# Patient Record
Sex: FEMALE | Race: BLACK OR AFRICAN AMERICAN | Employment: FULL TIME | URBAN - METROPOLITAN AREA
[De-identification: names, ages, dates, MRNs, and addresses within clinical notes are randomized per-mention and may not be internally consistent; named-entity substitution may affect disease eponyms.]

---

## 2022-01-18 ENCOUNTER — HOSPITAL ENCOUNTER (INPATIENT)
Age: 32
LOS: 13 days | Discharge: NURSING, REHAB OR LONG-TERM CARE | DRG: 029 | End: 2022-02-01
Attending: HOSPITALIST | Admitting: STUDENT IN AN ORGANIZED HEALTH CARE EDUCATION/TRAINING PROGRAM
Payer: COMMERCIAL

## 2022-01-18 DIAGNOSIS — Z71.89 ADVANCED CARE PLANNING/COUNSELING DISCUSSION: ICD-10-CM

## 2022-01-18 DIAGNOSIS — Z71.89 GOALS OF CARE, COUNSELING/DISCUSSION: ICD-10-CM

## 2022-01-18 DIAGNOSIS — G95.20 CORD COMPRESSION SYNDROME (HCC): Primary | ICD-10-CM

## 2022-01-18 DIAGNOSIS — Z51.5 ENCOUNTER FOR PALLIATIVE CARE: ICD-10-CM

## 2022-01-18 PROCEDURE — 65660000000 HC RM CCU STEPDOWN

## 2022-01-19 ENCOUNTER — APPOINTMENT (OUTPATIENT)
Dept: GENERAL RADIOLOGY | Age: 32
DRG: 029 | End: 2022-01-19
Attending: SPECIALIST
Payer: COMMERCIAL

## 2022-01-19 ENCOUNTER — APPOINTMENT (OUTPATIENT)
Dept: GENERAL RADIOLOGY | Age: 32
DRG: 029 | End: 2022-01-19
Attending: STUDENT IN AN ORGANIZED HEALTH CARE EDUCATION/TRAINING PROGRAM
Payer: COMMERCIAL

## 2022-01-19 ENCOUNTER — ANESTHESIA EVENT (OUTPATIENT)
Dept: SURGERY | Age: 32
DRG: 029 | End: 2022-01-19
Payer: COMMERCIAL

## 2022-01-19 ENCOUNTER — APPOINTMENT (OUTPATIENT)
Dept: MRI IMAGING | Age: 32
DRG: 029 | End: 2022-01-19
Attending: STUDENT IN AN ORGANIZED HEALTH CARE EDUCATION/TRAINING PROGRAM
Payer: COMMERCIAL

## 2022-01-19 ENCOUNTER — ANESTHESIA (OUTPATIENT)
Dept: SURGERY | Age: 32
DRG: 029 | End: 2022-01-19
Payer: COMMERCIAL

## 2022-01-19 PROBLEM — G95.20 CORD COMPRESSION SYNDROME (HCC): Status: ACTIVE | Noted: 2022-01-19

## 2022-01-19 LAB
ABO + RH BLD: NORMAL
ALBUMIN SERPL-MCNC: 3.1 G/DL (ref 3.5–5)
ALBUMIN/GLOB SERPL: 0.7 {RATIO} (ref 1.1–2.2)
ALP SERPL-CCNC: 103 U/L (ref 45–117)
ALT SERPL-CCNC: 45 U/L (ref 12–78)
ANION GAP SERPL CALC-SCNC: 7 MMOL/L (ref 5–15)
AST SERPL-CCNC: 79 U/L (ref 15–37)
BILIRUB SERPL-MCNC: 0.3 MG/DL (ref 0.2–1)
BLOOD GROUP ANTIBODIES SERPL: NORMAL
BUN SERPL-MCNC: 14 MG/DL (ref 6–20)
BUN/CREAT SERPL: 34 (ref 12–20)
CALCIUM SERPL-MCNC: 10.5 MG/DL (ref 8.5–10.1)
CHLORIDE SERPL-SCNC: 100 MMOL/L (ref 97–108)
CO2 SERPL-SCNC: 26 MMOL/L (ref 21–32)
COVID-19 RAPID TEST, COVR: NOT DETECTED
CREAT SERPL-MCNC: 0.41 MG/DL (ref 0.55–1.02)
ERYTHROCYTE [DISTWIDTH] IN BLOOD BY AUTOMATED COUNT: 13.5 % (ref 11.5–14.5)
GLOBULIN SER CALC-MCNC: 4.5 G/DL (ref 2–4)
GLUCOSE SERPL-MCNC: 95 MG/DL (ref 65–100)
HCT VFR BLD AUTO: 29.7 % (ref 35–47)
HGB BLD-MCNC: 9.4 G/DL (ref 11.5–16)
HISTORY CHECKED?,CKHIST: NORMAL
MCH RBC QN AUTO: 26 PG (ref 26–34)
MCHC RBC AUTO-ENTMCNC: 31.6 G/DL (ref 30–36.5)
MCV RBC AUTO: 82 FL (ref 80–99)
NRBC # BLD: 0 K/UL (ref 0–0.01)
NRBC BLD-RTO: 0 PER 100 WBC
PLATELET # BLD AUTO: 312 K/UL (ref 150–400)
PMV BLD AUTO: 10.3 FL (ref 8.9–12.9)
POTASSIUM SERPL-SCNC: 4.5 MMOL/L (ref 3.5–5.1)
PROT SERPL-MCNC: 7.6 G/DL (ref 6.4–8.2)
RBC # BLD AUTO: 3.62 M/UL (ref 3.8–5.2)
SODIUM SERPL-SCNC: 133 MMOL/L (ref 136–145)
SOURCE, COVRS: NORMAL
SPECIMEN EXP DATE BLD: NORMAL
WBC # BLD AUTO: 8.9 K/UL (ref 3.6–11)

## 2022-01-19 PROCEDURE — 80053 COMPREHEN METABOLIC PANEL: CPT

## 2022-01-19 PROCEDURE — 0SG10AJ FUSION OF 2 OR MORE LUMBAR VERTEBRAL JOINTS WITH INTERBODY FUSION DEVICE, POSTERIOR APPROACH, ANTERIOR COLUMN, OPEN APPROACH: ICD-10-PCS | Performed by: SPECIALIST

## 2022-01-19 PROCEDURE — 77030003666 HC NDL SPINAL BD -A: Performed by: SPECIALIST

## 2022-01-19 PROCEDURE — 74011000250 HC RX REV CODE- 250: Performed by: STUDENT IN AN ORGANIZED HEALTH CARE EDUCATION/TRAINING PROGRAM

## 2022-01-19 PROCEDURE — C1713 ANCHOR/SCREW BN/BN,TIS/BN: HCPCS | Performed by: SPECIALIST

## 2022-01-19 PROCEDURE — 74011250636 HC RX REV CODE- 250/636: Performed by: NURSE ANESTHETIST, CERTIFIED REGISTERED

## 2022-01-19 PROCEDURE — 76210000016 HC OR PH I REC 1 TO 1.5 HR: Performed by: SPECIALIST

## 2022-01-19 PROCEDURE — 74011000250 HC RX REV CODE- 250: Performed by: NURSE ANESTHETIST, CERTIFIED REGISTERED

## 2022-01-19 PROCEDURE — 72157 MRI CHEST SPINE W/O & W/DYE: CPT

## 2022-01-19 PROCEDURE — 00BX0ZX EXCISION OF THORACIC SPINAL CORD, OPEN APPROACH, DIAGNOSTIC: ICD-10-PCS | Performed by: SPECIALIST

## 2022-01-19 PROCEDURE — 74011250636 HC RX REV CODE- 250/636: Performed by: STUDENT IN AN ORGANIZED HEALTH CARE EDUCATION/TRAINING PROGRAM

## 2022-01-19 PROCEDURE — 88374 M/PHMTRC ALYS ISHQUANT/SEMIQ: CPT

## 2022-01-19 PROCEDURE — 74011250636 HC RX REV CODE- 250/636: Performed by: SPECIALIST

## 2022-01-19 PROCEDURE — 76010000174 HC OR TIME 3.5 TO 4 HR INTENSV-TIER 1: Performed by: SPECIALIST

## 2022-01-19 PROCEDURE — 0RGA0AJ FUSION OF THORACOLUMBAR VERTEBRAL JOINT WITH INTERBODY FUSION DEVICE, POSTERIOR APPROACH, ANTERIOR COLUMN, OPEN APPROACH: ICD-10-PCS | Performed by: SPECIALIST

## 2022-01-19 PROCEDURE — 77030013079 HC BLNKT BAIR HGGR 3M -A: Performed by: ANESTHESIOLOGY

## 2022-01-19 PROCEDURE — 72070 X-RAY EXAM THORAC SPINE 2VWS: CPT

## 2022-01-19 PROCEDURE — 74011250636 HC RX REV CODE- 250/636: Performed by: ANESTHESIOLOGY

## 2022-01-19 PROCEDURE — A9576 INJ PROHANCE MULTIPACK: HCPCS

## 2022-01-19 PROCEDURE — 88360 TUMOR IMMUNOHISTOCHEM/MANUAL: CPT

## 2022-01-19 PROCEDURE — 74011000250 HC RX REV CODE- 250: Performed by: SPECIALIST

## 2022-01-19 PROCEDURE — 77030040922 HC BLNKT HYPOTHRM STRY -A

## 2022-01-19 PROCEDURE — 88305 TISSUE EXAM BY PATHOLOGIST: CPT

## 2022-01-19 PROCEDURE — 72156 MRI NECK SPINE W/O & W/DYE: CPT

## 2022-01-19 PROCEDURE — 85027 COMPLETE CBC AUTOMATED: CPT

## 2022-01-19 PROCEDURE — 2709999900 HC NON-CHARGEABLE SUPPLY: Performed by: SPECIALIST

## 2022-01-19 PROCEDURE — 65660000000 HC RM CCU STEPDOWN

## 2022-01-19 PROCEDURE — 74011250637 HC RX REV CODE- 250/637: Performed by: SPECIALIST

## 2022-01-19 PROCEDURE — 88331 PATH CONSLTJ SURG 1 BLK 1SPC: CPT

## 2022-01-19 PROCEDURE — 0RG60AJ FUSION OF THORACIC VERTEBRAL JOINT WITH INTERBODY FUSION DEVICE, POSTERIOR APPROACH, ANTERIOR COLUMN, OPEN APPROACH: ICD-10-PCS | Performed by: SPECIALIST

## 2022-01-19 PROCEDURE — 77030008462 HC STPLR SKN PROX J&J -A: Performed by: SPECIALIST

## 2022-01-19 PROCEDURE — 77030021678 HC GLIDESCP STAT DISP VERT -B: Performed by: ANESTHESIOLOGY

## 2022-01-19 PROCEDURE — 77030003029 HC SUT VCRL J&J -B: Performed by: SPECIALIST

## 2022-01-19 PROCEDURE — 77030022272 HC GRFT BN MAGNIFUS OSTEO -I2: Performed by: SPECIALIST

## 2022-01-19 PROCEDURE — 77030029099 HC BN WAX SSPC -A: Performed by: SPECIALIST

## 2022-01-19 PROCEDURE — 74011250636 HC RX REV CODE- 250/636

## 2022-01-19 PROCEDURE — 86900 BLOOD TYPING SEROLOGIC ABO: CPT

## 2022-01-19 PROCEDURE — 77030012406 HC DRN WND PENRS BARD -A: Performed by: SPECIALIST

## 2022-01-19 PROCEDURE — 77030004391 HC BUR FLUT MEDT -C: Performed by: SPECIALIST

## 2022-01-19 PROCEDURE — 88342 IMHCHEM/IMCYTCHM 1ST ANTB: CPT

## 2022-01-19 PROCEDURE — 77030002933 HC SUT MCRYL J&J -A: Performed by: SPECIALIST

## 2022-01-19 PROCEDURE — 77030008684 HC TU ET CUF COVD -B: Performed by: ANESTHESIOLOGY

## 2022-01-19 PROCEDURE — 77030014650 HC SEAL MTRX FLOSEL BAXT -C: Performed by: SPECIALIST

## 2022-01-19 PROCEDURE — 76060000038 HC ANESTHESIA 3.5 TO 4 HR: Performed by: SPECIALIST

## 2022-01-19 PROCEDURE — 36415 COLL VENOUS BLD VENIPUNCTURE: CPT

## 2022-01-19 PROCEDURE — 74011250637 HC RX REV CODE- 250/637: Performed by: STUDENT IN AN ORGANIZED HEALTH CARE EDUCATION/TRAINING PROGRAM

## 2022-01-19 PROCEDURE — 77030040356 HC CORD BPLR FRCP COVD -A: Performed by: SPECIALIST

## 2022-01-19 PROCEDURE — 72158 MRI LUMBAR SPINE W/O & W/DYE: CPT

## 2022-01-19 PROCEDURE — 87635 SARS-COV-2 COVID-19 AMP PRB: CPT

## 2022-01-19 PROCEDURE — 88341 IMHCHEM/IMCYTCHM EA ADD ANTB: CPT

## 2022-01-19 DEVICE — DBM 7509212 MAGNIFUSE 1 X 20CM
Type: IMPLANTABLE DEVICE | Site: SPINE LUMBAR | Status: FUNCTIONAL
Brand: MAGNIFUSE® BONE GRAFT

## 2022-01-19 DEVICE — SCREW 55840006545 5.5/6 MAS 6.5X45 CC .
Type: IMPLANTABLE DEVICE | Site: SPINE LUMBAR | Status: FUNCTIONAL
Brand: CD HORIZON® SPINAL SYSTEM

## 2022-01-19 RX ORDER — FENTANYL 50 UG/1
1 PATCH TRANSDERMAL
Status: DISCONTINUED | OUTPATIENT
Start: 2022-01-19 | End: 2022-01-26

## 2022-01-19 RX ORDER — HYDROMORPHONE HYDROCHLORIDE 1 MG/ML
2 INJECTION, SOLUTION INTRAMUSCULAR; INTRAVENOUS; SUBCUTANEOUS ONCE
Status: COMPLETED | OUTPATIENT
Start: 2022-01-19 | End: 2022-01-19

## 2022-01-19 RX ORDER — METOPROLOL TARTRATE 25 MG/1
25 TABLET, FILM COATED ORAL EVERY 12 HOURS
Status: DISCONTINUED | OUTPATIENT
Start: 2022-01-19 | End: 2022-01-27

## 2022-01-19 RX ORDER — HYDROMORPHONE HYDROCHLORIDE 1 MG/ML
1 INJECTION, SOLUTION INTRAMUSCULAR; INTRAVENOUS; SUBCUTANEOUS
Status: DISCONTINUED | OUTPATIENT
Start: 2022-01-19 | End: 2022-01-19

## 2022-01-19 RX ORDER — HYDROMORPHONE HYDROCHLORIDE 1 MG/ML
2 INJECTION, SOLUTION INTRAMUSCULAR; INTRAVENOUS; SUBCUTANEOUS
Status: DISCONTINUED | OUTPATIENT
Start: 2022-01-19 | End: 2022-01-19 | Stop reason: SDUPTHER

## 2022-01-19 RX ORDER — HYDROMORPHONE HYDROCHLORIDE 2 MG/ML
INJECTION, SOLUTION INTRAMUSCULAR; INTRAVENOUS; SUBCUTANEOUS AS NEEDED
Status: DISCONTINUED | OUTPATIENT
Start: 2022-01-19 | End: 2022-01-19 | Stop reason: HOSPADM

## 2022-01-19 RX ORDER — MIDAZOLAM HYDROCHLORIDE 1 MG/ML
1 INJECTION, SOLUTION INTRAMUSCULAR; INTRAVENOUS AS NEEDED
Status: DISCONTINUED | OUTPATIENT
Start: 2022-01-19 | End: 2022-01-19 | Stop reason: HOSPADM

## 2022-01-19 RX ORDER — HYDROMORPHONE HYDROCHLORIDE 2 MG/ML
2 INJECTION, SOLUTION INTRAMUSCULAR; INTRAVENOUS; SUBCUTANEOUS
Status: DISCONTINUED | OUTPATIENT
Start: 2022-01-19 | End: 2022-01-20

## 2022-01-19 RX ORDER — FENTANYL CITRATE 50 UG/ML
INJECTION, SOLUTION INTRAMUSCULAR; INTRAVENOUS AS NEEDED
Status: DISCONTINUED | OUTPATIENT
Start: 2022-01-19 | End: 2022-01-19 | Stop reason: HOSPADM

## 2022-01-19 RX ORDER — ONDANSETRON 4 MG/1
4 TABLET, ORALLY DISINTEGRATING ORAL
Status: DISCONTINUED | OUTPATIENT
Start: 2022-01-19 | End: 2022-02-01 | Stop reason: HOSPADM

## 2022-01-19 RX ORDER — DEXAMETHASONE SODIUM PHOSPHATE 4 MG/ML
INJECTION, SOLUTION INTRA-ARTICULAR; INTRALESIONAL; INTRAMUSCULAR; INTRAVENOUS; SOFT TISSUE AS NEEDED
Status: DISCONTINUED | OUTPATIENT
Start: 2022-01-19 | End: 2022-01-19 | Stop reason: HOSPADM

## 2022-01-19 RX ORDER — LABETALOL HCL 20 MG/4 ML
SYRINGE (ML) INTRAVENOUS AS NEEDED
Status: DISCONTINUED | OUTPATIENT
Start: 2022-01-19 | End: 2022-01-19 | Stop reason: HOSPADM

## 2022-01-19 RX ORDER — LIDOCAINE HYDROCHLORIDE 10 MG/ML
0.1 INJECTION, SOLUTION EPIDURAL; INFILTRATION; INTRACAUDAL; PERINEURAL AS NEEDED
Status: DISCONTINUED | OUTPATIENT
Start: 2022-01-19 | End: 2022-01-19 | Stop reason: HOSPADM

## 2022-01-19 RX ORDER — ONDANSETRON 2 MG/ML
4 INJECTION INTRAMUSCULAR; INTRAVENOUS
Status: DISCONTINUED | OUTPATIENT
Start: 2022-01-19 | End: 2022-02-01 | Stop reason: HOSPADM

## 2022-01-19 RX ORDER — DIPHENHYDRAMINE HCL 25 MG
25 CAPSULE ORAL
Status: DISCONTINUED | OUTPATIENT
Start: 2022-01-19 | End: 2022-02-01 | Stop reason: HOSPADM

## 2022-01-19 RX ORDER — SODIUM CHLORIDE 0.9 % (FLUSH) 0.9 %
10 SYRINGE (ML) INJECTION
Status: COMPLETED | OUTPATIENT
Start: 2022-01-19 | End: 2022-01-19

## 2022-01-19 RX ORDER — MIDAZOLAM HYDROCHLORIDE 1 MG/ML
0.5 INJECTION, SOLUTION INTRAMUSCULAR; INTRAVENOUS
Status: DISCONTINUED | OUTPATIENT
Start: 2022-01-19 | End: 2022-01-19 | Stop reason: HOSPADM

## 2022-01-19 RX ORDER — ROCURONIUM BROMIDE 10 MG/ML
INJECTION, SOLUTION INTRAVENOUS AS NEEDED
Status: DISCONTINUED | OUTPATIENT
Start: 2022-01-19 | End: 2022-01-19 | Stop reason: HOSPADM

## 2022-01-19 RX ORDER — SODIUM CHLORIDE 9 MG/ML
150 INJECTION, SOLUTION INTRAVENOUS CONTINUOUS
Status: DISCONTINUED | OUTPATIENT
Start: 2022-01-19 | End: 2022-01-20

## 2022-01-19 RX ORDER — METOPROLOL TARTRATE 25 MG/1
25 TABLET, FILM COATED ORAL 2 TIMES DAILY
COMMUNITY
End: 2022-02-01

## 2022-01-19 RX ORDER — FENTANYL CITRATE 50 UG/ML
25 INJECTION, SOLUTION INTRAMUSCULAR; INTRAVENOUS
Status: COMPLETED | OUTPATIENT
Start: 2022-01-19 | End: 2022-01-19

## 2022-01-19 RX ORDER — ROPIVACAINE HYDROCHLORIDE 5 MG/ML
30 INJECTION, SOLUTION EPIDURAL; INFILTRATION; PERINEURAL AS NEEDED
Status: DISCONTINUED | OUTPATIENT
Start: 2022-01-19 | End: 2022-01-19 | Stop reason: HOSPADM

## 2022-01-19 RX ORDER — SODIUM CHLORIDE 0.9 % (FLUSH) 0.9 %
5-40 SYRINGE (ML) INJECTION AS NEEDED
Status: DISCONTINUED | OUTPATIENT
Start: 2022-01-19 | End: 2022-01-19 | Stop reason: HOSPADM

## 2022-01-19 RX ORDER — HYDROMORPHONE HYDROCHLORIDE 1 MG/ML
2 INJECTION, SOLUTION INTRAMUSCULAR; INTRAVENOUS; SUBCUTANEOUS
Status: DISCONTINUED | OUTPATIENT
Start: 2022-01-19 | End: 2022-01-19

## 2022-01-19 RX ORDER — MORPHINE SULFATE 2 MG/ML
2 INJECTION, SOLUTION INTRAMUSCULAR; INTRAVENOUS
Status: DISCONTINUED | OUTPATIENT
Start: 2022-01-19 | End: 2022-01-19 | Stop reason: HOSPADM

## 2022-01-19 RX ORDER — SODIUM CHLORIDE, SODIUM LACTATE, POTASSIUM CHLORIDE, CALCIUM CHLORIDE 600; 310; 30; 20 MG/100ML; MG/100ML; MG/100ML; MG/100ML
INJECTION, SOLUTION INTRAVENOUS
Status: DISCONTINUED | OUTPATIENT
Start: 2022-01-19 | End: 2022-01-19 | Stop reason: HOSPADM

## 2022-01-19 RX ORDER — POLYETHYLENE GLYCOL 3350 17 G/17G
17 POWDER, FOR SOLUTION ORAL DAILY PRN
Status: DISCONTINUED | OUTPATIENT
Start: 2022-01-19 | End: 2022-01-20

## 2022-01-19 RX ORDER — LIDOCAINE HYDROCHLORIDE 20 MG/ML
INJECTION, SOLUTION EPIDURAL; INFILTRATION; INTRACAUDAL; PERINEURAL AS NEEDED
Status: DISCONTINUED | OUTPATIENT
Start: 2022-01-19 | End: 2022-01-19 | Stop reason: HOSPADM

## 2022-01-19 RX ORDER — SODIUM CHLORIDE 9 MG/ML
250 INJECTION, SOLUTION INTRAVENOUS AS NEEDED
Status: DISCONTINUED | OUTPATIENT
Start: 2022-01-19 | End: 2022-02-01 | Stop reason: HOSPADM

## 2022-01-19 RX ORDER — SODIUM CHLORIDE 0.9 % (FLUSH) 0.9 %
5-40 SYRINGE (ML) INJECTION EVERY 8 HOURS
Status: DISCONTINUED | OUTPATIENT
Start: 2022-01-19 | End: 2022-02-01 | Stop reason: HOSPADM

## 2022-01-19 RX ORDER — NALOXONE HYDROCHLORIDE 0.4 MG/ML
0.4 INJECTION, SOLUTION INTRAMUSCULAR; INTRAVENOUS; SUBCUTANEOUS AS NEEDED
Status: DISCONTINUED | OUTPATIENT
Start: 2022-01-19 | End: 2022-02-01 | Stop reason: HOSPADM

## 2022-01-19 RX ORDER — LIDOCAINE HYDROCHLORIDE AND EPINEPHRINE 10; 10 MG/ML; UG/ML
INJECTION, SOLUTION INFILTRATION; PERINEURAL AS NEEDED
Status: DISCONTINUED | OUTPATIENT
Start: 2022-01-19 | End: 2022-01-19 | Stop reason: HOSPADM

## 2022-01-19 RX ORDER — SODIUM CHLORIDE, SODIUM LACTATE, POTASSIUM CHLORIDE, CALCIUM CHLORIDE 600; 310; 30; 20 MG/100ML; MG/100ML; MG/100ML; MG/100ML
100 INJECTION, SOLUTION INTRAVENOUS CONTINUOUS
Status: DISCONTINUED | OUTPATIENT
Start: 2022-01-19 | End: 2022-01-19 | Stop reason: HOSPADM

## 2022-01-19 RX ORDER — ONDANSETRON 2 MG/ML
4 INJECTION INTRAMUSCULAR; INTRAVENOUS AS NEEDED
Status: DISCONTINUED | OUTPATIENT
Start: 2022-01-19 | End: 2022-01-19 | Stop reason: HOSPADM

## 2022-01-19 RX ORDER — DIPHENHYDRAMINE HYDROCHLORIDE 50 MG/ML
12.5 INJECTION, SOLUTION INTRAMUSCULAR; INTRAVENOUS AS NEEDED
Status: DISCONTINUED | OUTPATIENT
Start: 2022-01-19 | End: 2022-01-19 | Stop reason: HOSPADM

## 2022-01-19 RX ORDER — OXYCODONE HYDROCHLORIDE 5 MG/1
5 TABLET ORAL AS NEEDED
Status: DISCONTINUED | OUTPATIENT
Start: 2022-01-19 | End: 2022-01-19 | Stop reason: HOSPADM

## 2022-01-19 RX ORDER — SODIUM CHLORIDE 0.9 % (FLUSH) 0.9 %
5-40 SYRINGE (ML) INJECTION EVERY 8 HOURS
Status: DISCONTINUED | OUTPATIENT
Start: 2022-01-19 | End: 2022-01-19 | Stop reason: HOSPADM

## 2022-01-19 RX ORDER — ACETAMINOPHEN 650 MG/1
650 SUPPOSITORY RECTAL
Status: DISCONTINUED | OUTPATIENT
Start: 2022-01-19 | End: 2022-02-01 | Stop reason: HOSPADM

## 2022-01-19 RX ORDER — ACETAMINOPHEN 325 MG/1
650 TABLET ORAL ONCE
Status: DISCONTINUED | OUTPATIENT
Start: 2022-01-19 | End: 2022-01-19 | Stop reason: HOSPADM

## 2022-01-19 RX ORDER — SODIUM CHLORIDE 9 MG/ML
125 INJECTION, SOLUTION INTRAVENOUS CONTINUOUS
Status: DISPENSED | OUTPATIENT
Start: 2022-01-19 | End: 2022-01-20

## 2022-01-19 RX ORDER — ACETAMINOPHEN 325 MG/1
650 TABLET ORAL
Status: DISCONTINUED | OUTPATIENT
Start: 2022-01-19 | End: 2022-02-01 | Stop reason: HOSPADM

## 2022-01-19 RX ORDER — FENTANYL CITRATE 50 UG/ML
50 INJECTION, SOLUTION INTRAMUSCULAR; INTRAVENOUS AS NEEDED
Status: DISCONTINUED | OUTPATIENT
Start: 2022-01-19 | End: 2022-01-19 | Stop reason: HOSPADM

## 2022-01-19 RX ORDER — SODIUM CHLORIDE, SODIUM LACTATE, POTASSIUM CHLORIDE, CALCIUM CHLORIDE 600; 310; 30; 20 MG/100ML; MG/100ML; MG/100ML; MG/100ML
25 INJECTION, SOLUTION INTRAVENOUS CONTINUOUS
Status: DISCONTINUED | OUTPATIENT
Start: 2022-01-19 | End: 2022-01-19 | Stop reason: HOSPADM

## 2022-01-19 RX ORDER — ACETAMINOPHEN 325 MG/1
650 TABLET ORAL EVERY 6 HOURS
Status: DISCONTINUED | OUTPATIENT
Start: 2022-01-19 | End: 2022-02-01 | Stop reason: HOSPADM

## 2022-01-19 RX ORDER — CEFAZOLIN SODIUM 1 G/3ML
INJECTION, POWDER, FOR SOLUTION INTRAMUSCULAR; INTRAVENOUS AS NEEDED
Status: DISCONTINUED | OUTPATIENT
Start: 2022-01-19 | End: 2022-01-19 | Stop reason: HOSPADM

## 2022-01-19 RX ORDER — SODIUM CHLORIDE 0.9 % (FLUSH) 0.9 %
5-40 SYRINGE (ML) INJECTION AS NEEDED
Status: DISCONTINUED | OUTPATIENT
Start: 2022-01-19 | End: 2022-02-01 | Stop reason: HOSPADM

## 2022-01-19 RX ORDER — SODIUM CHLORIDE 9 MG/ML
25 INJECTION, SOLUTION INTRAVENOUS CONTINUOUS
Status: DISCONTINUED | OUTPATIENT
Start: 2022-01-19 | End: 2022-01-19 | Stop reason: HOSPADM

## 2022-01-19 RX ORDER — HYDROMORPHONE HYDROCHLORIDE 1 MG/ML
0.2 INJECTION, SOLUTION INTRAMUSCULAR; INTRAVENOUS; SUBCUTANEOUS
Status: DISCONTINUED | OUTPATIENT
Start: 2022-01-19 | End: 2022-01-19 | Stop reason: HOSPADM

## 2022-01-19 RX ORDER — FENTANYL 25 UG/1
2 PATCH TRANSDERMAL
COMMUNITY
End: 2022-02-01

## 2022-01-19 RX ORDER — SODIUM CHLORIDE 9 MG/ML
250 INJECTION, SOLUTION INTRAVENOUS AS NEEDED
Status: CANCELLED | OUTPATIENT
Start: 2022-01-19

## 2022-01-19 RX ORDER — ONDANSETRON 2 MG/ML
INJECTION INTRAMUSCULAR; INTRAVENOUS AS NEEDED
Status: DISCONTINUED | OUTPATIENT
Start: 2022-01-19 | End: 2022-01-19 | Stop reason: HOSPADM

## 2022-01-19 RX ORDER — MIDAZOLAM HYDROCHLORIDE 1 MG/ML
INJECTION, SOLUTION INTRAMUSCULAR; INTRAVENOUS AS NEEDED
Status: DISCONTINUED | OUTPATIENT
Start: 2022-01-19 | End: 2022-01-19 | Stop reason: HOSPADM

## 2022-01-19 RX ORDER — PROPOFOL 10 MG/ML
INJECTION, EMULSION INTRAVENOUS AS NEEDED
Status: DISCONTINUED | OUTPATIENT
Start: 2022-01-19 | End: 2022-01-19 | Stop reason: HOSPADM

## 2022-01-19 RX ORDER — ONDANSETRON 2 MG/ML
4 INJECTION INTRAMUSCULAR; INTRAVENOUS
Status: ACTIVE | OUTPATIENT
Start: 2022-01-19 | End: 2022-01-20

## 2022-01-19 RX ORDER — METOPROLOL TARTRATE 5 MG/5ML
INJECTION INTRAVENOUS AS NEEDED
Status: DISCONTINUED | OUTPATIENT
Start: 2022-01-19 | End: 2022-01-19 | Stop reason: HOSPADM

## 2022-01-19 RX ORDER — SUCCINYLCHOLINE CHLORIDE 20 MG/ML
INJECTION INTRAMUSCULAR; INTRAVENOUS AS NEEDED
Status: DISCONTINUED | OUTPATIENT
Start: 2022-01-19 | End: 2022-01-19 | Stop reason: HOSPADM

## 2022-01-19 RX ADMIN — ROCURONIUM BROMIDE 25 MG: 10 SOLUTION INTRAVENOUS at 13:15

## 2022-01-19 RX ADMIN — ROCURONIUM BROMIDE 5 MG: 10 SOLUTION INTRAVENOUS at 13:06

## 2022-01-19 RX ADMIN — FENTANYL CITRATE 50 MCG: 50 INJECTION, SOLUTION INTRAMUSCULAR; INTRAVENOUS at 13:06

## 2022-01-19 RX ADMIN — METOPROLOL TARTRATE 1 MG: 5 INJECTION, SOLUTION INTRAVENOUS at 14:47

## 2022-01-19 RX ADMIN — MEPERIDINE HYDROCHLORIDE: 25 INJECTION INTRAMUSCULAR; INTRAVENOUS; SUBCUTANEOUS at 17:00

## 2022-01-19 RX ADMIN — FENTANYL CITRATE 25 MCG: 50 INJECTION, SOLUTION INTRAMUSCULAR; INTRAVENOUS at 17:24

## 2022-01-19 RX ADMIN — SODIUM CHLORIDE, POTASSIUM CHLORIDE, SODIUM LACTATE AND CALCIUM CHLORIDE 25 ML/HR: 600; 310; 30; 20 INJECTION, SOLUTION INTRAVENOUS at 11:48

## 2022-01-19 RX ADMIN — SODIUM CHLORIDE, PRESERVATIVE FREE 10 ML: 5 INJECTION INTRAVENOUS at 05:43

## 2022-01-19 RX ADMIN — FENTANYL CITRATE 50 MCG: 50 INJECTION, SOLUTION INTRAMUSCULAR; INTRAVENOUS at 13:19

## 2022-01-19 RX ADMIN — HYDROMORPHONE HYDROCHLORIDE 2 MG: 1 INJECTION, SOLUTION INTRAMUSCULAR; INTRAVENOUS; SUBCUTANEOUS at 11:07

## 2022-01-19 RX ADMIN — HYDROMORPHONE HYDROCHLORIDE 1 MG: 2 INJECTION, SOLUTION INTRAMUSCULAR; INTRAVENOUS; SUBCUTANEOUS at 15:16

## 2022-01-19 RX ADMIN — METOPROLOL TARTRATE 1 MG: 5 INJECTION, SOLUTION INTRAVENOUS at 14:18

## 2022-01-19 RX ADMIN — HYDROMORPHONE HYDROCHLORIDE 0.5 MG: 2 INJECTION, SOLUTION INTRAMUSCULAR; INTRAVENOUS; SUBCUTANEOUS at 13:31

## 2022-01-19 RX ADMIN — ROCURONIUM BROMIDE 10 MG: 10 SOLUTION INTRAVENOUS at 15:52

## 2022-01-19 RX ADMIN — METOPROLOL TARTRATE 1 MG: 5 INJECTION, SOLUTION INTRAVENOUS at 15:05

## 2022-01-19 RX ADMIN — HYDROMORPHONE HYDROCHLORIDE 1 MG: 1 INJECTION, SOLUTION INTRAMUSCULAR; INTRAVENOUS; SUBCUTANEOUS at 04:28

## 2022-01-19 RX ADMIN — DEXAMETHASONE 6 MG: 1 TABLET ORAL at 03:43

## 2022-01-19 RX ADMIN — GADOTERIDOL 20 ML: 279.3 INJECTION, SOLUTION INTRAVENOUS at 05:42

## 2022-01-19 RX ADMIN — DEXAMETHASONE SODIUM PHOSPHATE 6 MG: 4 INJECTION, SOLUTION INTRAMUSCULAR; INTRAVENOUS at 13:37

## 2022-01-19 RX ADMIN — SUGAMMADEX 200 MG: 100 INJECTION, SOLUTION INTRAVENOUS at 16:14

## 2022-01-19 RX ADMIN — ACETAMINOPHEN 650 MG: 325 TABLET ORAL at 19:22

## 2022-01-19 RX ADMIN — ROCURONIUM BROMIDE 10 MG: 10 SOLUTION INTRAVENOUS at 14:30

## 2022-01-19 RX ADMIN — ROCURONIUM BROMIDE 20 MG: 10 SOLUTION INTRAVENOUS at 13:43

## 2022-01-19 RX ADMIN — LABETALOL 20 MG/4 ML (5 MG/ML) INTRAVENOUS SYRINGE 5 MG: at 13:33

## 2022-01-19 RX ADMIN — CEFAZOLIN 2 G: 330 INJECTION, POWDER, FOR SOLUTION INTRAMUSCULAR; INTRAVENOUS at 13:18

## 2022-01-19 RX ADMIN — LABETALOL 20 MG/4 ML (5 MG/ML) INTRAVENOUS SYRINGE 5 MG: at 13:58

## 2022-01-19 RX ADMIN — LIDOCAINE HYDROCHLORIDE 100 MG: 20 INJECTION, SOLUTION EPIDURAL; INFILTRATION; INTRACAUDAL; PERINEURAL at 13:06

## 2022-01-19 RX ADMIN — PROPOFOL 150 MG: 10 INJECTION, EMULSION INTRAVENOUS at 13:06

## 2022-01-19 RX ADMIN — FENTANYL CITRATE 25 MCG: 50 INJECTION, SOLUTION INTRAMUSCULAR; INTRAVENOUS at 17:16

## 2022-01-19 RX ADMIN — FENTANYL CITRATE 25 MCG: 50 INJECTION, SOLUTION INTRAMUSCULAR; INTRAVENOUS at 17:37

## 2022-01-19 RX ADMIN — METOPROLOL TARTRATE 1 MG: 5 INJECTION, SOLUTION INTRAVENOUS at 14:12

## 2022-01-19 RX ADMIN — METOPROLOL TARTRATE 25 MG: 25 TABLET, FILM COATED ORAL at 23:00

## 2022-01-19 RX ADMIN — LABETALOL 20 MG/4 ML (5 MG/ML) INTRAVENOUS SYRINGE 5 MG: at 13:47

## 2022-01-19 RX ADMIN — DEXAMETHASONE 6 MG: 1 TABLET ORAL at 23:01

## 2022-01-19 RX ADMIN — LABETALOL 20 MG/4 ML (5 MG/ML) INTRAVENOUS SYRINGE 5 MG: at 13:40

## 2022-01-19 RX ADMIN — ROCURONIUM BROMIDE 10 MG: 10 SOLUTION INTRAVENOUS at 13:55

## 2022-01-19 RX ADMIN — SODIUM CHLORIDE, PRESERVATIVE FREE 10 ML: 5 INJECTION INTRAVENOUS at 22:00

## 2022-01-19 RX ADMIN — SODIUM CHLORIDE, POTASSIUM CHLORIDE, SODIUM LACTATE AND CALCIUM CHLORIDE: 600; 310; 30; 20 INJECTION, SOLUTION INTRAVENOUS at 15:04

## 2022-01-19 RX ADMIN — SUCCINYLCHOLINE CHLORIDE 140 MG: 20 INJECTION, SOLUTION INTRAMUSCULAR; INTRAVENOUS at 13:06

## 2022-01-19 RX ADMIN — SODIUM CHLORIDE, PRESERVATIVE FREE 10 ML: 5 INJECTION INTRAVENOUS at 08:14

## 2022-01-19 RX ADMIN — ACETAMINOPHEN 650 MG: 325 TABLET ORAL at 23:00

## 2022-01-19 RX ADMIN — SODIUM CHLORIDE, POTASSIUM CHLORIDE, SODIUM LACTATE AND CALCIUM CHLORIDE: 600; 310; 30; 20 INJECTION, SOLUTION INTRAVENOUS at 12:57

## 2022-01-19 RX ADMIN — MIDAZOLAM 2 MG: 1 INJECTION INTRAMUSCULAR; INTRAVENOUS at 12:57

## 2022-01-19 RX ADMIN — FENTANYL CITRATE 25 MCG: 50 INJECTION, SOLUTION INTRAMUSCULAR; INTRAVENOUS at 17:30

## 2022-01-19 RX ADMIN — WATER 2 G: 1 INJECTION INTRAMUSCULAR; INTRAVENOUS; SUBCUTANEOUS at 23:19

## 2022-01-19 RX ADMIN — HYDROMORPHONE HYDROCHLORIDE 1 MG: 2 INJECTION, SOLUTION INTRAMUSCULAR; INTRAVENOUS; SUBCUTANEOUS at 15:34

## 2022-01-19 RX ADMIN — ONDANSETRON HYDROCHLORIDE 4 MG: 2 INJECTION, SOLUTION INTRAMUSCULAR; INTRAVENOUS at 16:05

## 2022-01-19 RX ADMIN — ROCURONIUM BROMIDE 10 MG: 10 SOLUTION INTRAVENOUS at 14:46

## 2022-01-19 RX ADMIN — METOPROLOL TARTRATE 1 MG: 5 INJECTION, SOLUTION INTRAVENOUS at 15:01

## 2022-01-19 RX ADMIN — PROPOFOL 50 MG: 10 INJECTION, EMULSION INTRAVENOUS at 13:59

## 2022-01-19 RX ADMIN — HYDROMORPHONE HYDROCHLORIDE 1 MG: 1 INJECTION, SOLUTION INTRAMUSCULAR; INTRAVENOUS; SUBCUTANEOUS at 08:14

## 2022-01-19 RX ADMIN — HYDROMORPHONE HYDROCHLORIDE 0.5 MG: 2 INJECTION, SOLUTION INTRAMUSCULAR; INTRAVENOUS; SUBCUTANEOUS at 13:58

## 2022-01-19 RX ADMIN — MORPHINE SULFATE 2 MG: 2 INJECTION, SOLUTION INTRAMUSCULAR; INTRAVENOUS at 17:44

## 2022-01-19 RX ADMIN — ROCURONIUM BROMIDE 10 MG: 10 SOLUTION INTRAVENOUS at 15:15

## 2022-01-19 RX ADMIN — METOPROLOL TARTRATE 25 MG: 25 TABLET, FILM COATED ORAL at 09:34

## 2022-01-19 RX ADMIN — HYDROMORPHONE HYDROCHLORIDE 2 MG: 2 INJECTION INTRAMUSCULAR; INTRAVENOUS; SUBCUTANEOUS at 19:28

## 2022-01-19 RX ADMIN — SODIUM CHLORIDE 150 ML/HR: 9 INJECTION, SOLUTION INTRAVENOUS at 08:14

## 2022-01-19 RX ADMIN — SODIUM CHLORIDE 125 ML/HR: 900 INJECTION, SOLUTION INTRAVENOUS at 17:39

## 2022-01-19 NOTE — PROGRESS NOTES
Physical Therapy: Hold    Chart reviewed in preparation for PT evaluation. Note patient has multiple spinal fractures and severe cord compression. Will hold PT at this time and will f/u after neurosurgical intervention.     Lashonda Erickson, PT, DPT

## 2022-01-19 NOTE — PERIOP NOTES
Surgifoam Absorbable Gelatin Sponge Size 100 used during the procedure  Ref # [id number]  Lot # [id number]  Exp. Date 9/28/2025    Floseal Hemostatic Matrix, 10 ml used during the procedure  Ref # [id number]  Lot # [id number]  Exp.  Date 10/25/2023

## 2022-01-19 NOTE — CONSULTS
Neurosurgery  Pt seen and examined, full consult to follow. Known metastatic breast cancer. Several days of urinary retention, received a catheter on last ED visit  Now 2 days unable to move legs. MRI shows extensive cancer at every level of spine. Pathologic compression fracture L1. With significant disease in the epidural space and causing cord compression. I discussed findings with her and showed her the imaging. Discussed need to decompress her given her paralysis. Need to fuse given fracture and kyphosis    We discussed the risk of bleeding, infection, nerve or spinal cord injury which could lead to worsening of the pain, numbness or weakness, spinal cord injury which could lead to worsening paralysis,   the risk of misplacement of the graft, screws or rods, the chance that the screws will not hold given her extensive cancer. The risk of non-union of the bone, the risk of heart attack, stroke, coma and death,   blood clots in legs, urine infection or pneumonia     We discussed that she may not regain any function despite what we do. Will proceed with T11- L2 decompression and fusion.

## 2022-01-19 NOTE — PROGRESS NOTES
Spiritual Care Assessment/Progress Note  [facility]      NAME: Milton Huerta      MRN: [id number]  AGE: 33 y.o. SEX: female  Judaism Affiliation: Unknown   Language: English     1/19/2022     Total Time (in minutes): 5     Spiritual Assessment begun in [facility] through conversation with:         []Patient        [] Family    [] Friend(s)        Reason for Consult: Palliative Care, Initial/Spiritual Assessment     Spiritual beliefs: (Please include comment if needed)     [] Identifies with a eloy tradition:         [] Supported by a eloy community:            [] Claims no spiritual orientation:           [] Seeking spiritual identity:                [] Adheres to an individual form of spirituality:           [x] Not able to assess:                           Identified resources for coping:      [] Prayer                               [] Music                  [] Guided Imagery     [] Family/friends                 [] Pet visits     [] Devotional reading                         [x] Unknown     [] Other:                                               Interventions offered during this visit: (See comments for more details)                Plan of Care:     [] Support spiritual and/or cultural needs    [] Support AMD and/or advance care planning process      [] Support grieving process   [] Coordinate Rites and/or Rituals    [] Coordination with community clergy   [] No spiritual needs identified at this time   [] Detailed Plan of Care below (See Comments)  [] Make referral to Music Therapy  [] Make referral to Pet Therapy     [] Make referral to Addiction services  [] Make referral to [facility]  [] Make referral to Spiritual Care Partner  [] No future visits requested        [x] Contact Spiritual Care for further referrals     Comments: Attempted Initial Spiritual Assessment for this pt in [facility] 665. Reviewed pt's chart prior to this visit. Pt was BUNNY and therefore unable to be assessed at this time. No family/friends present at time of visit. Contact Spiritual Care Services for any spiritual or emotional support needs. Jesus Lares MDiv.  Staff   Request  Support/Spiritual Care Services on [phone])

## 2022-01-19 NOTE — PERIOP NOTES
Per Dr. Marcin Zuñiga patients baseline movement was very little in BLE. Per patient was unable to walk prior to surgery      1750-MD wrote order for orthopedic floor however, patient has an order for telemetry so patient is going to room 664      1803-TRANSFER - OUT REPORT:    Verbal report given to JULIAN Verma(name) on Rahat Muñoz  being transferred to Room 665(unit) for routine post - op       Report consisted of patients Situation, Background, Assessment and   Recommendations(SBAR). Time Pre op antibiotic given:1318  Anesthesia Stop time: 2243  Ochoa Present on Transfer to floor:yes  Order for Ochoa on Chart:yes  Discharge Prescriptions with Chart:no    Information from the following report(s) SBAR, OR Summary, Procedure Summary, Intake/Output, MAR, Recent Results and Cardiac Rhythm sinus tach was reviewed with the receiving nurse. Opportunity for questions and clarification was provided. Is the patient on 02? NO           Is the patient on a monitor? YES    Is the nurse transporting with the patient? YES    Surgical Waiting Area notified of patient's transfer from PACU? NO      The following personal items collected during your admission accompanied patient upon transfer:   Dental Appliance: Dental Appliances: None  Vision:    Hearing Aid:    Jewelry:    Clothing:    Other Valuables:    Valuables sent to safe:      No valuables to be transferred with patient.   Patient came from room

## 2022-01-19 NOTE — ANESTHESIA PREPROCEDURE EVALUATION
Relevant Problems   No relevant active problems       Anesthetic History   No history of anesthetic complications            Review of Systems / Medical History  Patient summary reviewed, nursing notes reviewed and pertinent labs reviewed    Pulmonary  Within defined limits                 Neuro/Psych             Comments: Thoracic cord compression due to tumor Cardiovascular                       GI/Hepatic/Renal                Endo/Other        Cancer and anemia     Other Findings              Physical Exam    Airway  Mallampati: II  TM Distance: > 6 cm  Neck ROM: normal range of motion   Mouth opening: Normal     Cardiovascular    Rhythm: regular  Rate: normal         Dental    Dentition: Upper dentition intact and Lower dentition intact     Pulmonary  Breath sounds clear to auscultation               Abdominal  GI exam deferred       Other Findings            Anesthetic Plan    ASA: 3  Anesthesia type: general          Induction: Intravenous  Anesthetic plan and risks discussed with: Patient

## 2022-01-19 NOTE — CONSULTS
[address]    Name:  Jose Alfredo Brown  MR#:  [id number]  :  1990  ACCOUNT #:  [de-identified]  DATE OF SERVICE:  2022    INPATIENT CONSULTATION    REASON FOR CONSULTATION:  Spinal cord tumor. HISTORY OF PRESENT ILLNESS:  The patient is a 49-year-old female, she has known history of metastatic breast cancer. PET scan in the end of December showed disease in the spine and liver. I do not have any further information on her breast cancer. She came  . She has had two visits to the emergency room on  and  for low back pain and urinary retention, they put a catheter in and sent her home. For the last two days, she has had inability to walk and an inability to move her legs probably two days now. She cannot feel when we touch her; however, she only has minimal movement of her toes. She was transferred here and had an MRI done, which showed extensive disease. I was called for evaluation. PAST MEDICAL HISTORY:  Breast cancer, question SVT. MEDICATIONS:    fentanyl patch 25 mcg. ALLERGIES:  NO KNOWN DRUG ALLERGIES. SOCIAL HISTORY:  She lives with her parents. Does not smoke. FAMILY HISTORY:  Noncontributory. REVIEW OF SYSTEMS:  No nausea or vomiting, fevers, chills, chest pain or shortness of breath. Rest of the 10 systems are reviewed and are negative except as above. PHYSICAL EXAMINATION:  GENERAL:  She is a well-developed, well-nourished female, sitting in no acute distress. HEENT:  Her head is normocephalic, atraumatic. Pupils are equal, round, and reactive to light. Extraocular movements are intact. Face is symmetric. Tongue and uvula are midline. NEUROLOGIC:  Shoulder shrug is normal.  Strength is 5/5 in upper extremities. In her lower extremities, she is barely able to wiggle her toes. When I hold up her leg, her strength in her knee, extension is 2/5, hip flexors are 1/5 to 0/5.   Sensation is decreased, but intact in her lower extremities. She is awake, alert, oriented x3. Her speech is fluent. She has normal recent and remote memory. Normal fund of knowledge. Normal cerebellar exam in upper extremities. IMAGING:  MRI shows extensive disease throughout her entire cervical, thoracic, and lumbar spine. At L1, there is compression fracture with epidural disease and certainly the spinal cord and out the soft tissue on the right causing significant spinal cord narrowing and compression. IMPRESSION:  Extensive bony disease and epidural spread. RECOMMENDATIONS:  I had a long discussion with her and showed her the films. We discussed the risks, which were outlined in my progress note. She also has liver metastasis by report. We discussed taking her to surgery, decompressing her spine and doing a fusion. Discussed that she may not get better despite what we do. We will take her urgently as soon as the operating room is open.       Trisha Espitia MD      [id number]  D:  01/19/2022 11:26  T:  01/19/2022 16:38  JOB #:  [id number]

## 2022-01-19 NOTE — ADT AUTH CERT NOTES
[address]     FACILITY NPI :[id number]  FACILITY TAX ID :      [address]  [address] [facility] [address] [phone]          DEPT CONTACT:  Marleny Holt  NB#[phone]  [facility]#[phone]       Patient Name :Cynthia Adler   : 1990 (31 yrs)  MRN : [id number]     Patient Mailing Address [address] [facility] [[address] , [address]                  Insurance Plan Payor: BLUE CROSS / Plan: [address] [place] [address] / Product Type: PPO /      Primary Coverage Subscriber ID : [id number]     Secondary Coverage:  N/A        Current Patient Class : INPATIENT  Admit Date : 2022     REQUESTED LEVEL OF CARE: INPATIENT [101]                                                           Diagnosis : Cord compression syndrome ([facility] [address]                          ICD10 Code : Cord compression syndrome ([facility] [address] [G95.20]     Current Room and Bed OR/PL     Admitting and Attending Info:  Admitting Provider : João Hartmann MD   NPI: [id number]  Admitting Provider Phone.  [phone]  Admitting Provider Address: [address] [facility] 2nd Floor     Attending Provider Rodolfo Purcell MD   [id number]  Attending Provider Address:  [address] [place] [address]                                                    [id number]     Attending Provider Phone: Attending leo phone: [phone]

## 2022-01-19 NOTE — H&P
History & Physical    Primary Care Provider: Unknown, Provider, DPM  Source of Information: Patient and chart review    History of Presenting Illness:   Devon Sinclair is a 33 y.o. female with known medical history of metastatic breast cancer, svt/?tachycardia who presented to [facility] emergency room with complaints of urinary retention and inability to walk. Per chart review, patient has had recurrent visit to same ear initially in January 13 with concerns of low back pain treated with opioids. She was discharged and returned January 17 with concerns of urinary retention treated with catheterization. Returns with complaints of lower extremity weakness with difficulty walking as well as difficulty urinating. She describes this as having an urge to urinate and not having the capacity to push hard her urine. Had Ochoa catheter placed in ER. Upon further questioning, patient reports she has known meds to her pelvis, coccyx and potentially cervical spine which her oncologist was planning to obtain an MRI for. She is transferred here for availability of neurosurgery. The patient denies any fever, chills, chest or abdominal pain, nausea, vomiting, cough, congestion, recent illness, palpitations, or dysuria. Remarkable vitals on ER Presentations: BP on ER presentation 153/105. Labs Remarkable for: Rapid COVID-negative  ER Images: none     Review of Systems:  A comprehensive review of systems was negative except for that written in the History of Present Illness. No past medical history on file. No past surgical history on file. Prior to Admission medications    Not on File     Not on File   No family history on file.      SOCIAL HISTORY:  Patient resides:  Independently x   Assisted Living    SNF    With family care       Smoking history:   None x   Former    Chronic      Alcohol history:   None x   Social    Chronic      Ambulates:   Independently x w/cane    w/walker    w/wc    CODE STATUS:  DNR    Full x   Other      Objective:     Physical Exam:     Visit Vitals  BP (!) 141/103 (BP 1 Location: Right upper arm, BP Patient Position: At rest)   Pulse (!) 102   Temp 98 °F (36.7 °C)   Resp 20   SpO2 98%           General:  Alert, cooperative, no distress, appears stated age. Head:  Normocephalic, without obvious abnormality, atraumatic. Eyes:  Conjunctivae/corneas clear. PERRL, EOMs intact. Nose: Nares normal. Septum midline. Mucosa normal.        Neck: Supple, symmetrical, trachea midline, no carotid bruit and no JVD. Lungs:   Clear to auscultation bilaterally. Chest wall:  No tenderness or deformity. Heart:  Regular rate and rhythm, S1, S2 normal, no murmur, click, rub or gallop. Abdomen:   Soft, non-tender. Bowel sounds normal. No masses,  No organomegaly. Extremities: Extremities normal, atraumatic, no cyanosis or edema. Pulses: 2+ and symmetric all extremities. Skin: Skin color, texture, turgor normal. No rashes or lesions   Neurologic: CNII-XII intact. 1/5 strength in bilateral LEs. Decreased DTRs. Data Review:     Recent Days:  No results for input(s): WBC, HGB, HCT, PLT, HGBEXT, HCTEXT, PLTEXT in the last 72 hours. No results for input(s): NA, K, CL, CO2, GLU, BUN, CREA, CA, MG, PHOS, ALB, TBIL, ALT, INR, INREXT in the last 72 hours. No lab exists for component: SGOT  No results for input(s): PH, PCO2, PO2, HCO3, FIO2 in the last 72 hours. 24 Hour Results:  No results found for this or any previous visit (from the past 24 hour(s)). Imaging:     Assessment:     Yesenia Silverman is a 33 y.o. female with known medical history of metastatic breast cancer, svt/?tachycardia who is admitted for ? ?lumbar cord compression.        Plan:       ?Lumbar Cord Compression / Radiculopathy  -Admit to monitored telemetry  -MRI Cervical, thoracic and lumbar spine  -Dilaudid prn  -NS consult  -PT/OT/Palliative consult    Hx of SVT  -Continue metoprolol           FEN/GI -  NS @ 150 ml/hr  Activity - as tolerated  DVT prophylaxis - SCDs  GI prophylaxis -  NI  Disposition - Home    CODE STATUS:  Full code       Signed By: Sandi Lam MD     January 19, 2022

## 2022-01-19 NOTE — PROGRESS NOTES
Occupational Therapy: hold    Chart reviewed in preparation for OT evaluation. Note patient has multiple spinal fractures and severe cord compression. Will hold OT at this time and will f/u after neurosurgical intervention.     Kayla French, OTR/L

## 2022-01-19 NOTE — PROGRESS NOTES
Neurosurgery    Dr. Brandon Sanchez is aware of this consult from last night and will by to see the patient shortly. Send rapid COVID test for OR please. NPO.     Anjelica Saxena NP

## 2022-01-19 NOTE — ADT AUTH CERT NOTES
[address]     FACILITY NPI :[id number]  FACILITY TAX ID :      . [facility]  [address] [facility] [address] [phone]          DEPT CONTACT:  Ryland Narvaez  AS#[phone]  DGX#[phone]       Patient Name :Jessica Phan   : 1990 (31 yrs)  MRN : [id number]     Patient Mailing Address [address] [facility] [[address]] , [facility]                  Insurance Plan Payor: BLUE CROSS / Plan: [address] [place] [address] / Product Type: PPO /      Primary Coverage Subscriber ID : [id number]     Secondary Coverage:  N/A        Current Patient Class : INPATIENT  Admit Date : 2022     REQUESTED LEVEL OF CARE: INPATIENT [101]                                                           Diagnosis : Cord compression syndrome ([facility] [address]                          ICD10 Code : Cord compression syndrome ([facility] [address] [G95.20]     Current Room and Bed OR/PL     Admitting and Attending Info:  Admitting Provider : Doc Brito MD   NPI: [id number]  Admitting Provider Phone.  [phone]  Admitting Provider Address: [address] [facility]nd [address]     Attending Provider Renetta Wong MD   [id number]  Attending Provider Address:  [address] [place] [address]                                                    [id number]     Attending Provider Phone: Attending leo phone: [phone]

## 2022-01-19 NOTE — BRIEF OP NOTE
Brief Postoperative Note    Patient: Sierra Alonzo  YOB: 1990  MRN: [id number]    Date of Procedure: 1/19/2022     Pre-Op Diagnosis: Epidural spinal tumor    Post-Op Diagnosis: Same as preoperative diagnosis. Procedure(s):  T11 - L3 LAMINECTOMY AND FUSION WITH O-ARM    Surgeon(s):  Jonathon Reza MD    Surgical Assistant: Surg Asst-1: Bharathi Macias    Anesthesia: General     Estimated Blood Loss (mL): less than 203     Complications: None    Specimens:   ID Type Source Tests Collected by Time Destination   1 : Lumbar epidural tumor Frozen Section Spine  Jonathon Reza MD 1/19/2022 1508 Pathology   2 : Lumbar epidural tumor Fresh Spine  Jonathon Reza MD 1/19/2022 1621 Pathology        Implants:   Implant Name Type Inv. Item Serial No.  Lot No. LRB No. Used Action   SET SCR SPNL L6MM DIA5. 5MM TI BRK OFF SPRING W/ DETACH [employer] [employer] - SN/A  SET SCR SPNL L6MM DIA5. 5MM TI BRK OFF SPRING W/ DETACH [address] [employer] N/A [employer] SOFAMOR DANEK_WD N/A N/A 8 Implanted   SCREW SPNL L45MM DIA5. 5MM POST THORACOLUMBOSACRAL CO CHROM - SN/A  SCREW SPNL L45MM DIA5. 5MM POST THORACOLUMBOSACRAL CO CHROM N/A [employer] SOFAMOR DANEK_WD N/A N/A 3 Implanted   SCREW SPNL L50MM DIA5. 5MM POST THORACOLUMBOSACRAL CO CHROM - SN/A  SCREW SPNL L50MM DIA5. 5MM POST THORACOLUMBOSACRAL CO CHROM N/A [employer] SOFAMOR DANEK_WD N/A N/A 1 Implanted   SCREW SPNL L40MM DIA6. 5MM POST THORACOLUMBOSACRAL CO CHROM - SN/A  SCREW SPNL L40MM DIA6. 5MM POST THORACOLUMBOSACRAL CO CHROM N/A [employer] SOFAMOR DANEK_WD N/A N/A 3 Implanted   SCREW SPNL L45MM DIA6. 5MM POST THORACOLUMBOSACRAL CO CHROM - SN/A  SCREW SPNL L45MM DIA6. 5MM POST THORACOLUMBOSACRAL CO CHROM N/A [employer] SOFAMOR DANEK_WD N/A N/A 1 Implanted   GRAFT BNE SUB [id number] SPNL DEFORMITY MAGNIFUSE - [id number]  GRAFT BNE SUB [id number] SPNL DEFORMITY MAGNIFUSE [id number] [employer] SPINALGRAFT TECH_WD N/A N/A 1 Implanted   CHARY SPNL L110MM DIA5. 5MM TI ANT POST THORACOLUMBOSACRAL CRV - SN/A  CHARY SPNL L110MM DIA5. 5MM TI ANT POST THORACOLUMBOSACRAL CRV N/A [employer] SOFAMOR DANEK_WD N/A N/A 2 Implanted       Drains: * No LDAs found *    Findings: large epidural tumor    Electronically Signed by Tali Bazan MD on 1/19/2022 at 4:22 PM

## 2022-01-19 NOTE — PROGRESS NOTES
Bedside shift change report given to Liz Evans RN (oncoming nurse) by Nayeli Stafford RN (offgoing nurse). Report included the following information SBAR, Cardiac Rhythm nsr and Dual Neuro Assessment.

## 2022-01-19 NOTE — CONSULTS
Patient seen, chart reviewed, note dictated. Consult received and appreciated. Patient of my partner Dr. 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 Received TCHP followed by maintenance trastuzumab/pertuzumab X 1 year, then continued on GHRH/Tamoxifen. Recently noted to have worsening bone pain. CT/Bone scan then PET imaging as an outpatient showed worsening bony disease with ultimate plans for a biopsy to direct subsequent care. Now admitted with SCC at L1-L2 currently in the OR. Will meet and discuss tomorrow AM once.     Arnaud Anthony MD  Hem/Onc  [phone]

## 2022-01-20 LAB — HGB BLD-MCNC: 8.6 G/DL (ref 11.5–16)

## 2022-01-20 PROCEDURE — 97165 OT EVAL LOW COMPLEX 30 MIN: CPT

## 2022-01-20 PROCEDURE — 74011250636 HC RX REV CODE- 250/636: Performed by: PHYSICIAN ASSISTANT

## 2022-01-20 PROCEDURE — 97530 THERAPEUTIC ACTIVITIES: CPT

## 2022-01-20 PROCEDURE — 74011250636 HC RX REV CODE- 250/636: Performed by: SPECIALIST

## 2022-01-20 PROCEDURE — 51798 US URINE CAPACITY MEASURE: CPT

## 2022-01-20 PROCEDURE — 74011250637 HC RX REV CODE- 250/637: Performed by: SPECIALIST

## 2022-01-20 PROCEDURE — 74011250637 HC RX REV CODE- 250/637: Performed by: PHYSICIAN ASSISTANT

## 2022-01-20 PROCEDURE — 85018 HEMOGLOBIN: CPT

## 2022-01-20 PROCEDURE — 74011000250 HC RX REV CODE- 250: Performed by: STUDENT IN AN ORGANIZED HEALTH CARE EDUCATION/TRAINING PROGRAM

## 2022-01-20 PROCEDURE — 36415 COLL VENOUS BLD VENIPUNCTURE: CPT

## 2022-01-20 PROCEDURE — 74011250636 HC RX REV CODE- 250/636: Performed by: HOSPITALIST

## 2022-01-20 PROCEDURE — 74011250637 HC RX REV CODE- 250/637: Performed by: HOSPITALIST

## 2022-01-20 PROCEDURE — 74011250636 HC RX REV CODE- 250/636: Performed by: STUDENT IN AN ORGANIZED HEALTH CARE EDUCATION/TRAINING PROGRAM

## 2022-01-20 PROCEDURE — 94760 N-INVAS EAR/PLS OXIMETRY 1: CPT

## 2022-01-20 PROCEDURE — 65660000000 HC RM CCU STEPDOWN

## 2022-01-20 PROCEDURE — 97163 PT EVAL HIGH COMPLEX 45 MIN: CPT

## 2022-01-20 PROCEDURE — 74011000250 HC RX REV CODE- 250: Performed by: SPECIALIST

## 2022-01-20 RX ORDER — HYDROMORPHONE HYDROCHLORIDE 2 MG/1
1 TABLET ORAL
Status: DISCONTINUED | OUTPATIENT
Start: 2022-01-20 | End: 2022-01-27

## 2022-01-20 RX ORDER — DEXAMETHASONE 4 MG/1
4 TABLET ORAL EVERY 12 HOURS
Status: DISCONTINUED | OUTPATIENT
Start: 2022-01-20 | End: 2022-01-21

## 2022-01-20 RX ORDER — POLYETHYLENE GLYCOL 3350 17 G/17G
17 POWDER, FOR SOLUTION ORAL DAILY
Status: DISCONTINUED | OUTPATIENT
Start: 2022-01-21 | End: 2022-02-01 | Stop reason: HOSPADM

## 2022-01-20 RX ORDER — HYDROMORPHONE HYDROCHLORIDE 2 MG/1
2 TABLET ORAL
Status: DISCONTINUED | OUTPATIENT
Start: 2022-01-20 | End: 2022-01-27

## 2022-01-20 RX ORDER — AMOXICILLIN 250 MG
1 CAPSULE ORAL DAILY
Status: DISCONTINUED | OUTPATIENT
Start: 2022-01-20 | End: 2022-01-27

## 2022-01-20 RX ORDER — HYDROMORPHONE HYDROCHLORIDE 2 MG/ML
2 INJECTION, SOLUTION INTRAMUSCULAR; INTRAVENOUS; SUBCUTANEOUS
Status: DISCONTINUED | OUTPATIENT
Start: 2022-01-20 | End: 2022-01-24

## 2022-01-20 RX ORDER — GABAPENTIN 100 MG/1
200 CAPSULE ORAL 3 TIMES DAILY
Status: DISCONTINUED | OUTPATIENT
Start: 2022-01-20 | End: 2022-01-21

## 2022-01-20 RX ADMIN — SODIUM CHLORIDE, PRESERVATIVE FREE 10 ML: 5 INJECTION INTRAVENOUS at 13:56

## 2022-01-20 RX ADMIN — SODIUM CHLORIDE 125 ML/HR: 900 INJECTION, SOLUTION INTRAVENOUS at 03:08

## 2022-01-20 RX ADMIN — DEXAMETHASONE 4 MG: 4 TABLET ORAL at 21:19

## 2022-01-20 RX ADMIN — SODIUM CHLORIDE 125 ML/HR: 900 INJECTION, SOLUTION INTRAVENOUS at 07:40

## 2022-01-20 RX ADMIN — METOPROLOL TARTRATE 25 MG: 25 TABLET, FILM COATED ORAL at 09:24

## 2022-01-20 RX ADMIN — SODIUM CHLORIDE, PRESERVATIVE FREE 10 ML: 5 INJECTION INTRAVENOUS at 05:14

## 2022-01-20 RX ADMIN — METOPROLOL TARTRATE 25 MG: 25 TABLET, FILM COATED ORAL at 21:20

## 2022-01-20 RX ADMIN — GABAPENTIN 200 MG: 100 CAPSULE ORAL at 16:10

## 2022-01-20 RX ADMIN — DEXAMETHASONE 6 MG: 1 TABLET ORAL at 09:23

## 2022-01-20 RX ADMIN — SODIUM CHLORIDE, PRESERVATIVE FREE 10 ML: 5 INJECTION INTRAVENOUS at 05:04

## 2022-01-20 RX ADMIN — GABAPENTIN 200 MG: 100 CAPSULE ORAL at 21:20

## 2022-01-20 RX ADMIN — HYDROMORPHONE HYDROCHLORIDE 2 MG: 2 INJECTION INTRAMUSCULAR; INTRAVENOUS; SUBCUTANEOUS at 15:08

## 2022-01-20 RX ADMIN — Medication 1 LOZENGE: at 03:06

## 2022-01-20 RX ADMIN — GABAPENTIN 200 MG: 100 CAPSULE ORAL at 11:11

## 2022-01-20 RX ADMIN — ACETAMINOPHEN 650 MG: 325 TABLET ORAL at 05:03

## 2022-01-20 RX ADMIN — HYDROMORPHONE HYDROCHLORIDE 2 MG: 2 INJECTION INTRAMUSCULAR; INTRAVENOUS; SUBCUTANEOUS at 21:20

## 2022-01-20 RX ADMIN — ACETAMINOPHEN 650 MG: 325 TABLET ORAL at 11:11

## 2022-01-20 RX ADMIN — HYDROMORPHONE HYDROCHLORIDE 2 MG: 2 TABLET ORAL at 13:56

## 2022-01-20 RX ADMIN — WATER 2 G: 1 INJECTION INTRAMUSCULAR; INTRAVENOUS; SUBCUTANEOUS at 05:04

## 2022-01-20 RX ADMIN — HYDROMORPHONE HYDROCHLORIDE 2 MG: 2 INJECTION INTRAMUSCULAR; INTRAVENOUS; SUBCUTANEOUS at 09:23

## 2022-01-20 RX ADMIN — ACETAMINOPHEN 650 MG: 325 TABLET ORAL at 18:19

## 2022-01-20 RX ADMIN — HYDROMORPHONE HYDROCHLORIDE 2 MG: 2 INJECTION INTRAMUSCULAR; INTRAVENOUS; SUBCUTANEOUS at 05:03

## 2022-01-20 RX ADMIN — SENNOSIDES AND DOCUSATE SODIUM 1 TABLET: 50; 8.6 TABLET ORAL at 11:11

## 2022-01-20 NOTE — PROGRESS NOTES
Occupational Therapy    Occupational therapy evaluation completed. Full documentation to follow. Recommend inpatient rehab at d/c.     [place], OTR/L

## 2022-01-20 NOTE — OP NOTES
[address]  OPERATIVE REPORT    Name:  Ovidio Maher  MR#:  [id number]  :  1990  ACCOUNT #:  [de-identified]  DATE OF SERVICE:  2022    PREOPERATIVE DIAGNOSIS:  Epidural spinal metastatic cancer. POSTOPERATIVE DIAGNOSIS:  Epidural spinal metastatic cancer. PROCEDURES PERFORMED:  1. T11-L3 laminectomy. 2.  T11 to L3 fusion. 3.  T11 to L3 instrumentation. 4.  Use of the O-arm. SURGEON:  Danisha Read MD    FIRST ASSISTANT:  OR staff. ANESTHESIA:  General endotracheal.    COMPLICATIONS:  No complications. SPECIMENS REMOVED:  Specimens were sent for frozen and permanent, frozen came back consistent with adenocarcinoma. IMPLANTS:    .    ESTIMATED BLOOD LOSS:  100 mL. FINDINGS:  Fibrous epidural tumor. INDICATIONS:  The patient is a 25-year-old female with known breast cancer and metastatic disease. She became urinary incontinent 3 days ago and then she has not been able to move her leg for 3 days. She has trace movement in her toes only. She was found to have a large epidural tumor mainly at L1, but going up and down from there. It was decided to take her to the operating room and decompress her spine. 2 g of Ancef were given prior to incision and orders were written to stop within 24 hours. SCDs were used for the entire case. PROCEDURE:  The patient was brought to the operating room. After appropriate anesthesia was administered, she was turned prone on a Gustavo table. All pressure points were adequately padded. Incision was marked out using fluoroscopy guidance and infiltrated with 1% lidocaine with 1:100,000 epinephrine. The incision was made using #10 blade. Bovie electrocautery was used to cut through the subcutaneous tissue. A subperiosteal dissection was done at L1 and going up to T11 and down to L3. This was taken down over the transverse processes below and above with good lateral decompression.   There was obvious tumor on the right and the soft tissue at L1. Self-retaining retractor was placed in the wound. X-ray was taken to ensure we were at the right levels. At this point, the O-arm was brought in and a spin was done. This was then plugged in the computer and used for navigation of the drill for  holes for pedicle screws.  holes were placed at T11, T12, and then L2 and L3, followed by tapping each one at these levels under image guide. These were then stuffed with Gelfoam for use at the end of the case. Next, the decompression was done from T11 to L3, taking down the lamina. The entire facet joint at L2 came off as well involved with tumor. There was thick fibrous tumor over the epidural space, which was taken down using Prewitt and a Penfield 4, and then specimens were sent for frozen specimen as well as permanent specimen. Then, went out laterally and took down quite a bit of tumor especially off to the right side of the spinal cord. This went up and down until it was free from T11 down to L3. The area was irrigated copiously with antibiotic containing solution. Fusion was done from T11 to L3 decorticating the bone and placing a Magnifuse over this. Next, instrumentation was placed which was Solera CD Horizon with titanium rods with pedicle screws at T11-12 and then L2 and L3. The eve was shaped and placed in the saddles. The set screws were placed over these and broken off with the appropriate torque. The O-arm was brought in and a spin was done which showed good placement of the hardware. A drain was placed through a separate stab incision. Closure was done in anatomical layers. The skin was reapproximated using staples. Sponge and needle counts were correct x2. Dr. Nuzhat Haynes was present for the entire case from start to finish.       Gini Mata MD MM/CLAUDIA_ALICE_I/  D:  01/19/2022 17:30  T:  01/20/2022 2:15  JOB #:  [id number]

## 2022-01-20 NOTE — PROGRESS NOTES
Bedside and Verbal shift change report given to   Olvin Wynn RN (oncoming nurse) by Eliu Valdes RN (offgoing nurse).  Report included the following information SBAR, Kardex, ED Summary, OR Summary, Procedure Summary, Intake/Output, MAR, Recent Results and Cardiac Rhythm ST.

## 2022-01-20 NOTE — PROGRESS NOTES
Problem: Mobility Impaired (Adult and Pediatric)  Goal: *Acute Goals and Plan of Care (Insert Text)  Description: FUNCTIONAL STATUS PRIOR TO ADMISSION: Patient was independent and active without use of DME. She has history of breast cancer with mets to the spine. HOME SUPPORT PRIOR TO ADMISSION: Pt lives with her 9year old son. Her mother is also involved in her care. Physical Therapy Goals  Initiated 1/20/2022    1. Patient will move from supine to sit and sit to supine , scoot up and down, and roll side to side in bed with moderate assistance  within 4 days. 2. Patient will perform sit to stand with maximal assistance within 4 days. 3. Patient will ambulate with maximal assistance for 5 feet with the least restrictive device within 4 days. 4. Patient will verbalize and demonstrate understanding of spinal precautions (No bending, lifting greater than 5 lbs, or twisting; log-roll technique; frequent repositioning as instructed) within 4 days. Outcome: Not Met  PHYSICAL THERAPY EVALUATION  Patient: Paula Hurd (2 y.o. female)  Date: 1/20/2022  Primary Diagnosis: Cord compression syndrome (HCC) [G95.20]  Procedure(s) (LRB):  T11 - L2 LAMINECTOMY AND FUSION WITH O-ARM (N/A) 1 Day Post-Op   Precautions: Fall,Back, brace when OOB    ASSESSMENT  Based on the objective data described below, the patient presents with back pain, decreased ROM, BLE weakness (-2/5 knee extension), poor activity tolerance, and overall decline in functional mobility s/p T11-L2 laminectomy and fusion now POD 1. At baseline, pt lives with her 9year old son and is fully independent with mobility and ADLs. She has a hx of breast cancer and her mother is involved in her care. This date, pt motivated to work with therapy but tearful and greatly limited by pain. Pt educated on back precautions and use of log roll technique. Transferred supine<>sit with maxA x2.   Once hips squared EOB she was able to maintain sitting balance unsupported. Pt unable to tolerate further mobility d/t pain and was returned to bed with all needs met. Recommending IPR upon discharge. Current Level of Function Impacting Discharge (mobility/balance): maxA x2 for supine<>sit transfer    Functional Outcome Measure: The patient scored 5/100 on the Barthel outcome measure. Other factors to consider for discharge: PMH of breast cancer with mets, independent baseline, pain, GOC     Patient will benefit from skilled therapy intervention to address the above noted impairments. PLAN :  Recommendations and Planned Interventions: bed mobility training, transfer training, gait training, therapeutic exercises, neuromuscular re-education, patient and family training/education, and therapeutic activities      Frequency/Duration: Patient will be followed by physical therapy:  twice daily to address goals. Recommendation for discharge: (in order for the patient to meet his/her long term goals)  Therapy 3 hours per day 5-7 days per week    This discharge recommendation:  Has not yet been discussed the attending provider and/or case management    IF patient discharges home will need the following DME: to be determined (TBD)         SUBJECTIVE:   Patient stated I like to sing in the [employer] choir    OBJECTIVE DATA SUMMARY:   HISTORY:    History reviewed. No pertinent past medical history. No past surgical history on file.     Home Situation  Home Environment: Private residence  # Steps to Enter: 3  Rails to Enter: Yes  Hand Rails : Left  One/Two Story Residence: Two story  # of Interior Steps: 13  Interior Rails: Left  Living Alone: No  Current DME Used/Available at Home: Cane, straight,Walker, rolling  Tub or Shower Type: Shower    EXAMINATION/PRESENTATION/DECISION MAKING:   Critical Behavior:  Neurologic State: Alert,Drowsy  Orientation Level: Oriented X4  Cognition: Appropriate decision making,Appropriate for age attention/concentration,Appropriate safety awareness,Follows commands    Range Of Motion:  AROM: Grossly decreased, non-functional           PROM: Grossly decreased, non-functional           Strength:    Strength: Grossly decreased, non-functional                    Tone & Sensation:   Tone: Normal              Sensation: Intact               Coordination:  Coordination: Grossly decreased, non-functional  Vision:      Functional Mobility:  Bed Mobility:     Supine to Sit: Maximum assistance;Assist x2  Sit to Supine: Maximum assistance;Assist x2  Scooting: Total assistance;Assist x2  Transfers:                             Balance:   Sitting: Impaired  Sitting - Static: Good (unsupported)  Sitting - Dynamic: Fair (occasional)    Functional Measure:  Barthel Index:    Bathin  Bladder: 0  Bowels: 0  Groomin  Dressin  Feedin  Mobility: 0  Stairs: 0  Toilet Use: 0  Transfer (Bed to Chair and Back): 0  Total: 5/100       The Barthel ADL Index: Guidelines  1. The index should be used as a record of what a patient does, not as a record of what a patient could do. 2. The main aim is to establish degree of independence from any help, physical or verbal, however minor and for whatever reason. 3. The need for supervision renders the patient not independent. 4. A patient's performance should be established using the best available evidence. Asking the patient, friends/relatives and nurses are the usual sources, but direct observation and common sense are also important. However direct testing is not needed. 5. Usually the patient's performance over the preceding 24-48 hours is important, but occasionally longer periods will be relevant. 6. Middle categories imply that the patient supplies over 50 per cent of the effort. 7. Use of aids to be independent is allowed.     Score Interpretation (from [address] [address])    Independent   60-79 Minimally independent   40-59 Partially dependent   20-39 Very dependent   <20 Totally dependent     -Tata Mir., Barthel, DAlbaniaW. (1965). Functional evaluation: the Barthel Index. [address] ([address] (1997). The Barthel activities of daily living index: self-reporting versus actual performance in the old (> or = 75 years). Journal of [address] [place] [address] 45(7), 14 [facility], MARYANN, Reginald Diallo., Julio C Ambrose (1999). Measuring the change in disability after inpatient rehabilitation; comparison of the responsiveness of the Barthel Index and Functional [place] Measure. Journal of Neurology, Neurosurgery, and Psychiatry, 66(4), 820-129. KATLYN Paris, ELIAN Reynoso, & Nilam Mena M.A. (2004) Assessment of post-stroke quality of life in cost-effectiveness studies: The usefulness of the Barthel Index and the EuroQoL-5D. Quality of Life Research, 15, 641-17           Physical Therapy Evaluation Charge Determination   History Examination Presentation Decision-Making   HIGH Complexity :3+ comorbidities / personal factors will impact the outcome/ POC  HIGH Complexity : 4+ Standardized tests and measures addressing body structure, function, activity limitation and / or participation in recreation  HIGH Complexity : Unstable and unpredictable characteristics  Other outcome measures Barthel  HIGH       Based on the above components, the patient evaluation is determined to be of the following complexity level: HIGH     Activity Tolerance:   Poor    After treatment patient left in no apparent distress:   Supine in bed, Call bell within reach, Bed / chair alarm activated, and Caregiver / family present    COMMUNICATION/EDUCATION:   The patients plan of care was discussed with: Occupational therapist, Registered nurse, and PA . Fall prevention education was provided and the patient/caregiver indicated understanding., Patient/family have participated as able in goal setting and plan of care. , and Patient/family agree to work toward stated goals and plan of care.    Thank you for this referral.  Cecilia South, PT, DPT   Time Calculation: 34 mins

## 2022-01-20 NOTE — PROGRESS NOTES
Hospitalist Progress Note      Hospital summary: 33 y.o lady w/ metastatic breast cancer who presented as a transfer from [facility] for management of cord compression from spine mets. Assessment/Plan:  Cord compression in the setting of metastatic breast cancer:  -s/p T11-L3 laminectomy on 1/19  -continue dexamethasone  -PT/OT as able  -neurosurgery and oncology consulted  -f/u pathology    Bilateral leg pain: some of this seems to be neuropathic  -continue fentanyl patch, PRN IV Dilaudid (increase to Q3H PRN)  -start gabapentin  -add stool softener    Code status: full  DVT prophylaxis: SCDs  Disposition: rehab when medically appropriate  ----------------------------------------------    CC: bilateral leg pain    S: she has pain in both legs from the proximal to distal femurs. The pain is burning in nature and constant with intermittent flares particularly with movement. Also constipation. No n/v/d. Review of Systems:  Pertinent items are noted in HPI.     O:  Visit Vitals  BP (!) 156/98   Pulse 97   Temp 99 °F (37.2 °C)   Resp 19   Ht 5' 2\" (1.575 m)   Wt 94.8 kg (208 lb 15.9 oz)   SpO2 99%   BMI 38.23 kg/m²       PHYSICAL EXAM:  Gen: NAD, non-toxic  HEENT: anicteric sclerae, normal conjunctiva  Neck: supple, trachea midline, no adenopathy  Heart: RRR, no MRG, no JVD, no peripheral edema  Lungs: CTA b/l, non-labored respirations  Abd: soft, NT, ND, BS+, no organomegaly  Extr: warm  Skin: dry, no rash  Neuro: CN II-XII grossly intact, normal speech, limited   Psych: normal mood, appropriate affect      Intake/Output Summary (Last 24 hours) at 1/20/2022 1510  Last data filed at 1/20/2022 4062  Gross per 24 hour   Intake 500 ml   Output 1295 ml   Net -795 ml        Recent labs & imaging reviewed:  Recent Results (from the past 24 hour(s))   HEMOGLOBIN    Collection Time: 01/20/22  3:05 AM   Result Value Ref Range    HGB 8.6 (L) 11.5 - 16.0 g/dL     Recent Labs 01/20/22  0305 01/19/22  0714   WBC  --  8.9   HGB 8.6* 9.4*   HCT  --  29.7*   PLT  --  312     Recent Labs     01/19/22  0714   *   K 4.5      CO2 26   BUN 14   CREA 0.41*   GLU 95   CA 10.5*     Recent Labs     01/19/22  0714   ALT 45      TBILI 0.3   TP 7.6   ALB 3.1*   GLOB 4.5*     No results for input(s): INR, PTP, APTT, INREXT in the last 72 hours. No results for input(s): FE, TIBC, PSAT, FERR in the last 72 hours. No results found for: FOL, RBCF   No results for input(s): PH, PCO2, PO2 in the last 72 hours. No results for input(s): CPK, CKNDX, TROIQ in the last 72 hours.     No lab exists for component: CPKMB  No results found for: CHOL, CHOLX, CHLST, CHOLV, HDL, HDLP, LDL, LDLC, DLDLP, TGLX, TRIGL, TRIGP, CHHD, CHHDX  No results found for: GLUCPOC  No results found for: COLOR, APPRN, SPGRU, REFSG, ROMERO, PROTU, GLUCU, KETU, BILU, UROU, SANAZ, LEUKU, GLUKE, EPSU, BACTU, WBCU, RBCU, CASTS, UCRY    Med list reviewed  Current Facility-Administered Medications   Medication Dose Route Frequency    HYDROmorphone (PF) (DILAUDID) injection 2 mg  2 mg IntraVENous Q3H PRN    gabapentin (NEURONTIN) capsule 200 mg  200 mg Oral TID    senna-docusate (PERICOLACE) 8.6-50 mg per tablet 1 Tablet  1 Tablet Oral DAILY    dexAMETHasone (DECADRON) tablet 4 mg  4 mg Oral Q12H    HYDROmorphone (DILAUDID) tablet 1 mg  1 mg Oral Q4H PRN    HYDROmorphone (DILAUDID) tablet 2 mg  2 mg Oral Q4H PRN    [START ON 1/21/2022] polyethylene glycol (MIRALAX) packet 17 g  17 g Oral DAILY    sodium chloride (NS) flush 5-40 mL  5-40 mL IntraVENous Q8H    sodium chloride (NS) flush 5-40 mL  5-40 mL IntraVENous PRN    acetaminophen (TYLENOL) tablet 650 mg  650 mg Oral Q6H PRN    Or    acetaminophen (TYLENOL) suppository 650 mg  650 mg Rectal Q6H PRN    ondansetron (ZOFRAN ODT) tablet 4 mg  4 mg Oral Q8H PRN    Or    ondansetron (ZOFRAN) injection 4 mg  4 mg IntraVENous Q6H PRN    metoprolol tartrate (LOPRESSOR) tablet 25 mg  25 mg Oral Q12H    fentaNYL (DURAGESIC) 50 mcg/hr patch 1 Patch  1 Patch TransDERmal Q72H    0.9% sodium chloride infusion 250 mL  250 mL IntraVENous PRN    0.9% sodium chloride infusion  125 mL/hr IntraVENous CONTINUOUS    sodium chloride (NS) flush 5-40 mL  5-40 mL IntraVENous Q8H    sodium chloride (NS) flush 5-40 mL  5-40 mL IntraVENous PRN    acetaminophen (TYLENOL) tablet 650 mg  650 mg Oral Q6H    naloxone (NARCAN) injection 0.4 mg  0.4 mg IntraVENous PRN    ondansetron (ZOFRAN) injection 4 mg  4 mg IntraVENous Q4H PRN    prochlorperazine (COMPAZINE) with saline injection 5 mg  5 mg IntraVENous Q6H PRN    diphenhydrAMINE (BENADRYL) capsule 25 mg  25 mg Oral Q6H PRN    phenol throat spray (CHLORASEPTIC) 1 Spray  1 Spray Oral PRN    benzocaine-menthoL (CEPACOL) lozenge 1 Lozenge  1 Lozenge Oral PRN       Care Plan discussed with:  Patient/Family and Nurse    Aggie Nuno MD  Internal Medicine  Date of Service: 1/20/2022

## 2022-01-20 NOTE — CONSULTS
[address]    Name:  John Miranda  MR#:  [id number]  :  1990  ACCOUNT #:  [de-identified]  DATE OF SERVICE:  2022    HEMATOLOGY-ONCOLOGY CONSULTATION    REASON FOR CONSULTATION:  Transfer for spinal cord compression. HISTORY OF PRESENT ILLNESS:  The patient is a very pleasant 25-year-old woman followed closely by Dr. Socorro Miller. She was diagnosed in 2019 with stage IV breast cancer, initial biopsy was ER positive, HER2/sobia equivocal.  She underwent a mastectomy in 2020 and that pathology was slightly different ER 48%, HER2/soiba 3+. She received subsequent treatment with docetaxel, Herceptin, pertuzumab as per a MARLYS trial followed by maintenance pertuzumab. More recently, she has been on Lupron and tamoxifen with surveillance scans. Unfortunately, she was noted to have worsening bone pain and underwent scan showing a recurrence. She was being set up for outpatient CT/PET and MRI with planned biopsy of a bony lesion when she developed difficulty walking. She was seen in the ER and underwent an MRI of the cervical, thoracic, and lumbar spine, which unfortunately showed diffuse bony metastatic disease, epidural spread of tumor from T10 through L2 with severe cord compression at L1 and L2 and multiple liver lesions. The patient was taken properly to surgery by Dr. Samantha Rizo, who performed T11 to L3 laminectomy and fusion. Frozen section came back consistent with adenocarcinoma, IHC pending. Currently, the patient is feeling well postoperatively. She does have some postoperative pain. She is able to move her toes today. PAST MEDICAL HISTORY:  1. Breast cancer as above. 2.  Previous Port-A-Cath placement. 3.  Previously on Zometa for bony disease. CURRENT MEDICATIONS:  In the hospital,  1. Dexamethasone 6 mg every 12 hours. 2.  Duragesic patch one patch q.72.  3.  Metoprolol 25 mg q.12.  4.  Tylenol p.r.n.  5.  Dilaudid p.r.n.     ALLERGIES: NO KNOWN DRUG ALLERGIES. SOCIAL HISTORY:  She works for  . Never smoker, rare alcohol, has one child at home. FAMILY HISTORY:  No family history of malignancy. REVIEW OF SYSTEMS:  GENERAL:  No fevers or chills. HEENT:  No auditory or visual change. LYMPHS:  No lumps or bumps in the neck, underarm, or groin. CARDIOVASCULAR:  No chest pain or palpitations. PULMONARY:  No cough or shortness of breath. GASTROINTESTINAL:  No abdominal pain, diarrhea, or constipation. GENITOURINARY:  No dysuria or incontinence. SKIN:  No rash or changing mole. MUSCULOSKELETAL:  As above. NEUROPSYCHIC:  As above. PHYSICAL EXAMINATION:  GENERAL:  Pleasant, in no acute distress. VITAL SIGNS:  Blood pressure 123/68, pulse 90, temperature 98, and 98% on room air. HEENT:  Sclerae anicteric. Oropharynx clear. NECK:  Supple without lymphadenopathy or thyromegaly. HEART:  Regular rate and rhythm without murmur, rub, or gallop. LUNGS:  Clear to auscultation bilaterally. ABDOMEN:  Nontender. Nondistended. Normoactive bowel sounds. No hepatosplenomegaly. EXTREMITIES:  She is able to move her toes. Given her pain, I did not do any formal neurologic testing. PSYCHIATRIC:  Normal mood and affect. Alert and oriented x3. ASSESSMENT AND PLAN:  A 68-year-old woman with recurrent ER positive, HER2/sobia positive breast cancer, was undergoing outpatient workup, recurrence with plans for MRI and biopsy. When she had more difficulty walking, MRI was moved up and showed multiple sites of disease and spinal cord compression at L1 and L2. The patient transferred here for neurosurgical care. 1.  Spinal cord compression, status post laminectomy: We will follow up with Dr. Minnie Cabrera, her primary oncologist.  Subsequent systemic treatment depending on tissue pathology given primary biopsy and mastectomies. Biopsy showed discordant pathologies. She is moving her toes today. 2.  Mild anemia. We will follow.     Thank you for consult.       Genny Monae MD      [id number]  D:  01/20/2022 8:29  T:  01/20/2022 12:34  JOB #:  [id number]  CC:  Chon Lew MD

## 2022-01-20 NOTE — PROGRESS NOTES
Problem: Falls - Risk of  Goal: *Absence of Falls  Description: Document [place] Fall Risk and appropriate interventions in the flowsheet. Outcome: Progressing Towards Goal  Note: Fall Risk Interventions:            Medication Interventions: Assess postural VS orthostatic hypotension    Elimination Interventions: Call light in reach              Problem: Patient Education: Go to Patient Education Activity  Goal: Patient/Family Education  Outcome: Progressing Towards Goal     Problem: Pain  Goal: *Control of Pain  Outcome: Progressing Towards Goal  Goal: *PALLIATIVE CARE:  Alleviation of Pain  Outcome: Progressing Towards Goal     Problem: Patient Education: Go to Patient Education Activity  Goal: Patient/Family Education  Outcome: Progressing Towards Goal     Problem: Pressure Injury - Risk of  Goal: *Prevention of pressure injury  Description: Document Broderick Scale and appropriate interventions in the flowsheet.   Outcome: Progressing Towards Goal  Note: Pressure Injury Interventions:  Sensory Interventions: Assess changes in LOC         Activity Interventions: Assess need for specialty bed    Mobility Interventions: Assess need for specialty bed    Nutrition Interventions: Discuss nutritional consult with provider                     Problem: Patient Education: Go to Patient Education Activity  Goal: Patient/Family Education  Outcome: Progressing Towards Goal

## 2022-01-20 NOTE — PROGRESS NOTES
Transition of Care Plan: IPR    RUR: 6%    PCP F/U: Dr. Basilia Zuluaga    Disposition: IPR    Transportation: family vs BLS    Main Contact: mother: Celestine Estes [phone]    4063: Met with patient at the bedside. Alert and oriented x 4. Confirmed demographics. States she plans on staying with her parents after discharge. States she was living alone prior to hospitalization. Uses a walker for DME. Reports that mother assists with ADLs. Uses [facility] pharmacy. Discussed recommendation from therapy for IPR. Patient is unsure if she wants to go to IPR and is thinking of [place]. Provided choices options and will meet with patient again this afternoon. Spoke with therapy who states they are working with her again this afternoon and will continue to reinforce their recommendation of IPR.     1533: Spoke with patient again after working with therapy. Is now agreeable to IPR. First choice [facility], second choice [facility]. Will refer to [facility] first to see if they can accept. Leon Howe RN, CRM    Reason for Admission:  Lumbar Cord Compression / Radiculopathy                     RUR Score:  6%                   Plan for utilizing home health: Therapy recommends IPR        PCP: First and Last name:  Indy Berg     [facility]   Are you a current patient: Yes/No: yes   Approximate date of last visit:    Can you participate in a virtual visit with your PCP:                     Current Advanced Directive/Advance Care Plan: Full Code      Healthcare Decision Maker:   Click here to complete [address] including selection of the Healthcare Decision Maker Relationship (ie \"Primary\")   mother: Celestine Estes [phone]                          Transition of Care Plan:  IPR if patient is agreeable                    Care Management Interventions  PCP Verified by CM: Yes  Mode of Transport at Discharge: Other (see comment) (family vs BLS)  Transition of Care Consult (CM Consult):  Other (HH vs IPR)  Physical Therapy Consult: Yes  Occupational Therapy Consult: Yes  Support Systems: Parent(s)  Confirm Follow Up Transport: Other (see comment) (family vs BLS)  Discharge Location  Patient Expects to be Discharged to[de-identified] Other: (IPR vs HH)     Transition of Care Plan:     The Plan for Transition of Care is related to the following treatment goals: IPR    The Patient and/or patient representative was provided with a choice of provider and agrees  with the discharge plan. Yes [x] No []    A Freedom of choice list was provided with basic dialogue that supports the patient's individualized plan of care/goals and shares the quality data associated with the providers.        Yes [x] No []

## 2022-01-20 NOTE — CONSULTS
Patient seen, chart reviewed, note dictated. 32 y/o woman with recurent ER+/Her-2/Lesley + breast CA. Was undergoing outpatient work-up of recurrence with plans for MRI and biopsy when she had more difficulty walking. MRI was moved up and showed multiple sites of disease and SCC at L1-L2. Patient transferred here for [facility]. 1. SCC s/p laminectoy: Will f/u with Dr. Tavia Weinstein. Subsequent systemic treatment dependent on tissue pathology. Original biopsy ER+/IHC 2+, but mastectomy was IHC3+ for Her-2/Lesley). Moving toes today. 2. Mild anemia: will follow. Thank you for consult.      Arlette Goodson MD  Hem/Onc  [id number]

## 2022-01-20 NOTE — PROGRESS NOTES
Problem: Mobility Impaired (Adult and Pediatric)  Goal: *Acute Goals and Plan of Care (Insert Text)  Description: FUNCTIONAL STATUS PRIOR TO ADMISSION: Patient was independent and active without use of DME. She has history of breast cancer with mets to the spine. HOME SUPPORT PRIOR TO ADMISSION: Pt lives with her 9year old son. Her mother is also involved in her care. Physical Therapy Goals  Initiated 1/20/2022    1. Patient will move from supine to sit and sit to supine , scoot up and down, and roll side to side in bed with moderate assistance  within 4 days. 2. Patient will perform sit to stand with maximal assistance within 4 days. 3. Patient will ambulate with maximal assistance for 5 feet with the least restrictive device within 4 days. 4. Patient will verbalize and demonstrate understanding of spinal precautions (No bending, lifting greater than 5 lbs, or twisting; log-roll technique; frequent repositioning as instructed) within 4 days. Outcome: Progressing Towards Goal  PHYSICAL THERAPY TREATMENT  Patient: Shelly Zavala (17 y.o. female)  Date: 1/20/2022  Diagnosis: Cord compression syndrome (HCC) [G95.20] <principal problem not specified>  Procedure(s) (LRB):  T11 - L2 LAMINECTOMY AND FUSION WITH O-ARM (N/A) 1 Day Post-Op  Precautions: Fall,Back  Chart, physical therapy assessment, plan of care and goals were reviewed. ASSESSMENT  Patient continues with skilled PT services and is progressing towards goals. Session timed with RN administering pain medication to allow for maximal activity tolerance. Despite medication timing, pt tearful and reported 9/10 pain which limited mobility session. Reviewed back precautions - pt able to verbally recall precautions but required VCs for log roll technique. Transferred supine<>sit with maxA x2. Pt able to maintain sitting balance EOB with single UE support but as unable to lift both arms at once.   Tolerated sitting for ~5 minutes before returning to bed. Pt left with all needs met and nursing updated. Recommending IPR upon discharge. Current Level of Function Impacting Discharge (mobility/balance): maxA x2 for supine<>sit transfer    Other factors to consider for discharge: pain level, PMH, GOC         PLAN :  Patient continues to benefit from skilled intervention to address the above impairments. Continue treatment per established plan of care. to address goals. Recommendation for discharge: (in order for the patient to meet his/her long term goals)  Therapy 3 hours per day 5-7 days per week  If d/c home, pt will be at bedlevel and will require assistance for all mobility and ADLs and benefit from [facility] PT    This discharge recommendation:  Has been made in collaboration with the attending provider and/or case management    IF patient discharges home will need the following DME: hospital bed       SUBJECTIVE:   Patient stated Pierce Farris can try to brush my teeth.     OBJECTIVE DATA SUMMARY:   Critical Behavior:  Neurologic State: Alert  Orientation Level: Oriented X4  Cognition: Appropriate decision making,Appropriate for age attention/concentration,Appropriate safety awareness,Follows commands     Functional Mobility Training:  Bed Mobility:  Supine to Sit: Maximum assistance;Assist x2  Sit to Supine: Maximum assistance;Assist x2  Scooting:  Total assistance;Assist x2    Balance:  Sitting: Impaired  Sitting - Static: Good (unsupported)  Sitting - Dynamic: Fair (occasional)    Pain Ratin/10 pain - RN aware and administered pain meds prior to session    Activity Tolerance:   Poor    After treatment patient left in no apparent distress:   Supine in bed, Call bell within reach, Caregiver / family present, and Side rails x 3    COMMUNICATION/COLLABORATION:   The patients plan of care was discussed with: Occupational therapist, Registered nurse, Case management, and PA .     Elissa Malik, PT, DPT   Time Calculation: 32 mins

## 2022-01-20 NOTE — ADT AUTH CERT NOTES
[address]     FACILITY NPI :[id number]  FACILITY TAX ID :      [facility]  [address] [facility] [address] [phone]          DEPT CONTACT:  Royal Ana PALMER#[phone]  YSX#[phone]       Patient Name :Deng Santiago   : 1990 (31 yrs)  MRN : [id number]     Patient Mailing Address [address] ;[facility] [47] , [address]                  Insurance Plan Payor: BLUE CROSS / Plan: [address] [place] [address] / Product Type: PPO /      Primary Coverage Subscriber ID : [id number]     Secondary Coverage:  N/A        Current Patient Class : INPATIENT  Admit Date : 2022     REQUESTED LEVEL OF CARE: INPATIENT [101]                                                           Diagnosis : Cord compression syndrome ([facility]                          ICD10 Code : Cord compression syndrome ([facility] [G95.20]     Current Room and Bed OR/PL     Admitting and Attending Info:  Admitting Provider : Kell Wadsworth MD   NPI: [id number]  Admitting Provider Phone.  [phone]  Admitting Provider Address: [address] [place] [address],, [address]     Attending Provider Geneva Smith MD   [id number]  Attending Provider Address:  [address] [place] [address]                                                    [id number]     Attending Provider Phone: Saulo bailey phone: [phone]             Utilization Reviews         Lumbar Laminectomy - Care Day 2 (2022) by Cooper Quintanilla RN       Review Entered Review Status   2022 13:48 Completed      Criteria Review      Care Day: 2 Care Date: 2022 Level of Care: Telemetry    Guideline Day 2    Level Of Care    ( ) Floor to discharge    2022 13:48:33 EST by Jason Koehler      LOC  ORTHOPEDICS    Clinical Status (X) * Procedure completed    1/20/2022 13:48:33 EST by Nadine Ramey      POD #1    ( ) * Hemodynamic stability    1/20/2022 13:48:33 EST by Nadine Ramey      VS 98 127 143/94 16 99% ROOM AIR    (X) * No new neurologic deficits    1/20/2022 13:48:33 EST by Nadine Ramey      She is able to move her toes    ( ) * Voiding ability at baseline    1/20/2022 13:48:33 EST by Nadine Ramey      aguilera catheter in place    (X) * No evidence of infection    (X) * Pain absent or managed    1/20/2022 13:48:33 EST by Nadine Ramey      dilaudid 2mg iv q4 prn x2    ( ) * Discharge plans and education understood    Activity    (X) * Ambulatory or acceptable for next level of care    1/20/2022 13:48:33 EST by Nadine Ramey      ambulate w/ assistance  per PT note Once hips squared EOB she was able to maintain sitting balance unsupported.  Pt unable to tolerate further mobility d/t pain and was returned to bed with all needs met.     Routes    (X) * Oral hydration    1/20/2022 13:48:33 EST by Rodo Johnson      adult regular diet    (X) * Oral medications or regimen acceptable for next level of care    1/20/2022 13:48:33 EST by Nadine Ramey      Neurontin 200mg po tid  Lopressor 25mg po q12  decadron 4mg po q12  Tylenol 650mg po q6    (X) * Oral diet or acceptable for next level of care    Medications    (X) * PCA absent    * Milestone   Additional Notes   1/20/2022   VS 98 127 143/94 16 99% ROOM AIR   LABS     HGB: 8.6 (L)      MEDS   NS @ 125 cc/hr   fentanyl patch td q72   ancef 2g iv q8         HEMATOLOGY/ONCOLOGY NOTE    The patient is a very pleasant 68-year-old woman followed closely by Dr. Kusum Ricci was diagnosed in 2019 with stage IV breast cancer, initial biopsy was ER positive, HER2/sobia equivocal.  She underwent a mastectomy in 03/2020 and that pathology was slightly different ER 48%, HER2/sobia 3+.  She received subsequent treatment with docetaxel, Herceptin, pertuzumab as per a MARLYS trial followed by maintenance pertuzumab.  More recently, she has been on Lupron and tamoxifen with surveillance scans.  Unfortunately, she was noted to have worsening bone pain and underwent scan showing a recurrence.  She was being set up for outpatient CT/PET and MRI with planned biopsy of a bony lesion when she developed difficulty walking.  She was seen in the ER and underwent an MRI of the cervical, thoracic, and lumbar spine, which unfortunately showed diffuse bony metastatic disease, epidural spread of tumor from T10 through L2 with severe cord compression at L1 and L2 and multiple liver lesions.       The patient was taken properly to surgery by Dr. Ernie Grimaldo, who performed T11 to L3 laminectomy and fusion.  Frozen section came back consistent with adenocarcinoma, IHC pending.       Currently, the patient is feeling well postoperatively.  She does have some postoperative pain.  She is able to move her toes today. ASSESSMENT AND PLAN:  A 64-year-old woman with recurrent ER positive, HER2/sobia positive breast cancer, was undergoing outpatient workup, recurrence with plans for MRI and biopsy.  When she had more difficulty walking, MRI was moved up and showed multiple sites of disease and spinal cord compression at L1 and L2.  The patient transferred here for neurosurgical care. 1.  Spinal cord compression, status post laminectomy:  We will follow up with Dr. Caleb Mills, her primary oncologist. Nickie Martell systemic treatment depending on tissue pathology given primary biopsy and mastectomies.  Biopsy showed discordant pathologies. Allison Matthews is moving her toes today. 2.  Mild anemia.  We will follow. SPINE SURGERY NOTE       POD:1 Day Post-Op       S/P: Procedure(s):   T11 - L2 LAMINECTOMY AND FUSION WITH O-ARM   On POD#1, patient is resting comfortably in bed. Pain is severe with movement.  She denies headache, chest pain, nausea, vomiting, and dyspnea.       Physical Exam:   Gen: No acute distress. Neuro: A&Ox3. Follows commands. Speech clear. Affect normal.   PERRL. EOMI. Face symmetric. Strength 5/5 in BUE. BLE hip flexors 0/5, knee extension is 1/5, ankle dorsi/plantar flexion 2/5. Wiggles toes bilaterally   Sensation intact. Gait deferred. Calves soft and supple; No pain with passive stretch   Skin: Incision C/D/I   Drain intact: output 150mL overnight   Assessment:       Active Problems:     Cord compression syndrome (HCC) (1/19/2022)           Plan:       1) s/p T11-L3 laminectomy/fusion w resection of epidural spinal metastatic breast cancer   -PT/OT - in TLSO               -Pain control - scheduled tylenol, fentanyl patch, gabapentin, prn dilaudid               -Taper decadron               -Monitor drain output; d/c tomorrow AM if output <30ml over 8 hours               -Regular diet               -Keep aguilera for 2-3 days followed by voiding trial...may have to d/c w aguilera if not successful   2) Breast CA               -Dr. Raghav Petersen is primary oncologist               -Per oncology, further systemic tx will be dependent on tissue pathology               -Path pending               -Will need radiation 4-weeks after surgery   3) Hx of SVT               -Cont metoprolol       Plan d/w Dr. Nara Mcwilliams           Lumbar Laminectomy - Care Day 1 (1/19/2022) by Rob Anderson RN       Review Entered Review Status   1/20/2022 10:32 Completed      Criteria Review      Care Day: 1 Care Date: 1/19/2022 Level of Care: Telemetry    Guideline Day 1    Level Of Care    (X) OR to floor    1/20/2022 10:32:48 EST by Nadine Ramey TELEMETRY    Clinical Status    (X) * Clinical Indications met    1/20/2022 10:32:48 EST by So Figueroa      The patient is a 68-year-old female with known breast cancer and metastatic disease.  She became urinary incontinent 3 days ago and then she has not been able to move her leg for 3 days.  found to have a large epidural tumor mainly at L1,    Activity    (X) Assisted ambulation postoperatively    1/20/2022 10:32:48 EST by Nadine Ramey      pt consulted    Routes    (X) IV fluids    1/20/2022 10:32:48 EST by Nadine Ramey      NS @ 125 cc/hr    (X) IV medications    1/20/2022 10:32:48 EST by Nadine Ramey      dilaudid 2mg iv x1  Demerol 12.5mg iv x1  morphine 1mg iv q4 prn x2  dilaudid 2mg iv q4 prn x1    (X) Diet as tolerated postoperatively    1/20/2022 10:32:48 EST by Nadine Ramey      adult reg diet    Medications    (X) Prophylactic antibiotics    1/20/2022 10:32:48 EST by Nadine Ramey      ancef 2g iv q8    * Milestone   Additional Notes   1/19/2022   LOC IP TELEMETRY   VS  98.7 117 138/92 14 98% ROOM AIR   LABS     WBC: 8.9   NRBC: 0.0   RBC: 3.62 (L)   HGB: 9.4 (L)   HCT: 29.7 (L)   Sodium: 133 (L)   Potassium: 4.5   Chloride: 100   CO2: 26   Anion gap: 7   Glucose: 95   BUN: 14   Creatinine: 0.41 (L)   BUN/Creatinine ratio: 34 (H)   Calcium: 10.5 (H)   GFR est non-AA: >60   GFR est AA: >60   Bilirubin, total: 0.3   Protein, total: 7.6   Albumin: 3.1 (L)   Globulin: 4.5 (H)   A-G Ratio: 0.7 (L)   ALT: 45   AST: 79 (H)      MRI SPINE IMPRESSION       1. Diffuse bony metastatic disease with pathologic fracture of C6. Minimal   epidural spread of tumor from mid body of C5 to the C6-7 disc space. No cord   compression      MRI LUMBAR SPINE IMPRESSION   1. Extensive bony metastatic disease with diffuse areas of epidural spread of   tumor. This is most significant from T10 through L2 with severe cord compression   at L1 and superior aspect of L2 and associated pathologic fracture of L1   2.  Soft tissue extension of tumor into the right paraspinous muscles at L1 with   patchy bilateral muscular enhancement          MEDS   Tylenol 650mg po q6   decadron 6mg po q12   Lopressor 25mg po q12      PLAN: activity as tolerated w/ assistance, SCDs, neurochecks q4, PT/OT consult, adult reg diet, fall precautions, continuous pulse ox, oncology consult, palliative care consult,       H&P   Damon Boone is a 33 y.o. female with known medical history of metastatic breast cancer, svt/?tachycardia who presented to [facility] emergency room with complaints of urinary retention and inability to walk.  Per chart review, patient has had recurrent visit to same ear initially in January 13 with concerns of low back pain treated with opioids.  She was discharged and returned January 17 with concerns of urinary retention treated with catheterization.  Returns with complaints of lower extremity weakness with difficulty walking as well as difficulty urinating. She describes this as having an urge to urinate and not having the capacity to push hard her urine.  Had Ochoa catheter placed in ER. Upon further questioning, patient reports she has known meds to her pelvis, coccyx and potentially cervical spine which her oncologist was planning to obtain an MRI for. She is transferred here for availability of neurosurgery. The patient denies any fever, chills, chest or abdominal pain, nausea, vomiting, cough, congestion, recent illness, palpitations, or dysuria. General: Alert, cooperative, no distress, appears stated age. Head: Normocephalic, without obvious abnormality, atraumatic. Eyes: Conjunctivae/corneas clear. PERRL, EOMs intact. Nose: Nares normal. Septum midline. Mucosa normal.         Neck: Supple, symmetrical, trachea midline, no carotid bruit and no JVD.        Lungs:   Clear to auscultation bilaterally. Chest wall: No tenderness or deformity. Heart: Regular rate and rhythm, S1, S2 normal, no murmur, click, rub or gallop. Abdomen:   Soft, non-tender. Bowel sounds normal. No masses,  No organomegaly. Extremities: Extremities normal, atraumatic, no cyanosis or edema. Pulses: 2+ and symmetric all extremities.    Skin: Skin color, texture, turgor normal. No rashes or lesions   Neurologic: CNII-XII intact. 1/5 strength in bilateral LEs. Decreased DTRs. Rahat Muñoz is a 33 y.o. female with known medical history of metastatic breast cancer, svt/?tachycardia who is admitted for ? ?lumbar cord compression.           Plan:           ?Lumbar Cord Compression / Radiculopathy   -Admit to monitored telemetry   -MRI Cervical, thoracic and lumbar spine   -Dilaudid prn   -NS consult   -PT/OT/Palliative consult       Hx of SVT   -Continue metoprolol    FEN/GI -  NS @ 150 ml/hr   Activity - as tolerated   DVT prophylaxis - SCDs   GI prophylaxis -  NI   Disposition - Home       CODE STATUS:  Full code      [address]   Neurosurgery   Pt seen and examined, full consult to follow.        Known metastatic breast cancer.       Several days of urinary retention, received a catheter on last ED visit   Now 2 days unable to move legs.       MRI shows extensive cancer at every level of spine. Pathologic compression fracture L1. With significant disease in the epidural space and causing cord compression.       I discussed findings with her and showed her the imaging.       Discussed need to decompress her given her paralysis. Need to fuse given fracture and kyphosis    We discussed that she may not regain any function despite what we do.       Will proceed with T11- L2 decompression and fusion.          OP NOTE   PREOPERATIVE DIAGNOSIS:  Epidural spinal metastatic cancer.       POSTOPERATIVE DIAGNOSIS:  Epidural spinal metastatic cancer.       PROCEDURES PERFORMED:   1.  T11-L3 laminectomy. 2.  T11 to L3 fusion. 3.  T11 to L3 instrumentation. 4.  Use of the O-arm. COMPLICATIONS:  No complications.       SPECIMENS REMOVED:  Specimens were sent for frozen and permanent, frozen came back consistent with adenocarcinoma.    INDICATIONS:  The patient is a 77-year-old female with known breast cancer and metastatic disease.  She became urinary incontinent 3 days ago and then she has not been able to move her leg for 3 days. Jp Martinez has trace movement in her toes only.  She was found to have a large epidural tumor mainly at L1, but going up and down from there. Poppy Bautista was decided to take her to the operating room and decompress her spine.          HEMATOLOGY/ONCOLOGY NOTE   Consult received and appreciated. Patient of my partner Dr. Minnie Cabrera with ER+/Her-2/Lesley+ CA. Received TCHP followed by maintenance trastuzumab/pertuzumab X 1 year, then continued on GHRH/Tamoxifen. Recently noted to have worsening bone pain. CT/Bone scan then PET imaging as an outpatient showed worsening bony disease with ultimate plans for a biopsy to direct subsequent care. Now admitted with SCC at L1-L2 currently in the OR.           Lumbar Laminectomy - Clinical Indications for Procedure by Pastor Yair RN       Review Entered Review Status   1/20/2022 10:31 Completed      Criteria Review      Clinical Indications for Procedure    Most Recent : Brenda Rodgers Most Recent Date: 1/20/2022 10:31:07 EST    (X) Procedure is indicated for  1 or more  of the following  (1) (2) (3) (4):       (X) Signs or symptoms of lumbar disease (eg, pain, motor weakness, bowel or bladder incontinence)       secondary to tumor or neoplasm (26) (27)       1/20/2022 10:31:07 EST by Nadine Ramey         Epidural spinal metastatic cancer. She became urinary incontinent 3 days ago and then she has not been able to move her leg for 3 days.        H&P Notes       H&P by Van Paige MD at 01/19/22 0301 documented on Admission (Current) from 1/18/2022 in [facility] 6S NEURO-SCI TELE    Author: Van Paige MD Author Type: Physician Filed: 01/19/22 1236   Note Status: Signed Cosign: Cosign Not Required Date of Service: 01/19/22 0301   : Van Paige MD (Physician)               Expand AllCollapse All                                         History & Physical     Primary Care Provider: Unknown, Provider, DPM  Source of Information: Patient and chart review         History of Presenting Illness:   Sharmin Roberts is a 33 y.o. female with known medical history of metastatic breast cancer, svt/?tachycardia who presented to [facility] emergency room with complaints of urinary retention and inability to walk. Per chart review, patient has had recurrent visit to same ear initially in January 13 with concerns of low back pain treated with opioids. She was discharged and returned January 17 with concerns of urinary retention treated with catheterization. Returns with complaints of lower extremity weakness with difficulty walking as well as difficulty urinating. She describes this as having an urge to urinate and not having the capacity to push hard her urine. Had Ochoa catheter placed in ER. Upon further questioning, patient reports she has known meds to her pelvis, coccyx and potentially cervical spine which her oncologist was planning to obtain an MRI for. She is transferred here for availability of neurosurgery. The patient denies any fever, chills, chest or abdominal pain, nausea, vomiting, cough, congestion, recent illness, palpitations, or dysuria.     Remarkable vitals on ER Presentations: BP on ER presentation 153/105. Labs Remarkable for: Rapid COVID-negative  ER Images: none       Review of Systems:  A comprehensive review of systems was negative except for that written in the History of Present Illness.      No past medical history on file. No past surgical history on file.   Prior to Admission medications    Not on File      Not on File   No family history on file.      SOCIAL HISTORY:  Patient resides:  Independently x   Assisted Living     SNF     With family care         Smoking history:   None x   Former     Chronic        Alcohol history:   None x   Social     Chronic        Ambulates:   Independently x   w/cane     w/walker     w/wc     CODE STATUS:  DNR     Full x   Other        Objective:      Physical Exam:    Visit Vitals  BP (!) 141/103 (BP 1 Location: Right upper arm, BP Patient Position: At rest)   Pulse (!) 102   Temp 98 °F (36.7 °C)   Resp 20   SpO2 98%            General:  Alert, cooperative, no distress, appears stated age. Head:  Normocephalic, without obvious abnormality, atraumatic. Eyes:  Conjunctivae/corneas clear. PERRL, EOMs intact. Nose: Nares normal. Septum midline. Mucosa normal.          Neck: Supple, symmetrical, trachea midline, no carotid bruit and no JVD.         Lungs:   Clear to auscultation bilaterally. Chest wall:  No tenderness or deformity. Heart:  Regular rate and rhythm, S1, S2 normal, no murmur, click, rub or gallop. Abdomen:   Soft, non-tender. Bowel sounds normal. No masses,  No organomegaly. Extremities: Extremities normal, atraumatic, no cyanosis or edema. Pulses: 2+ and symmetric all extremities. Skin: Skin color, texture, turgor normal. No rashes or lesions   Neurologic: CNII-XII intact. 1/5 strength in bilateral LEs. Decreased DTRs.       Data Review:      Recent Days:  No results for input(s): WBC, HGB, HCT, PLT, HGBEXT, HCTEXT, PLTEXT in the last 72 hours. No results for input(s): NA, K, CL, CO2, GLU, BUN, CREA, CA, MG, PHOS, ALB, TBIL, ALT, INR, INREXT in the last 72 hours.     No lab exists for component: SGOT  No results for input(s): PH, PCO2, PO2, HCO3, FIO2 in the last 72 hours.     24 Hour Results:  Recent Results   No results found for this or any previous visit (from the past 24 hour(s)).           Imaging:      Assessment:      Qing Garrison is a 33 y.o. female with known medical history of metastatic breast cancer, svt/?tachycardia who is admitted for ? ?lumbar cord compression.         Plan:         ?Lumbar Cord Compression / Radiculopathy  -Admit to monitored telemetry  -MRI Cervical, thoracic and lumbar spine  -Dilaudid prn  -NS consult  -PT/OT/Palliative consult     Hx of SVT  -Continue metoprolol              FEN/GI -  NS @ 150 ml/hr  Activity - as tolerated  DVT prophylaxis - SCDs  GI prophylaxis -  NI  Disposition - Home     CODE STATUS:  Full code         Signed By: Fransico Ramires MD      January 19, 2022

## 2022-01-20 NOTE — PROGRESS NOTES
Spine Surgery Progress Note  Tamiko Peña PA-C    Admit Date: 2022   LOS: 1 day      Daily Progress Note: 2022    POD:1 Day Post-Op    S/P: Procedure(s):  T11 - L2 LAMINECTOMY AND FUSION WITH O-ARM    Subjective:     Ms. Elliott Nation is a very pleasant 68-year-old female with a known history of metastatic breast cancer. PET scan in the end of December showed disease in the spine and liver. Pt has had two visits to the emergency room on  and  for low back pain and urinary retention. For the last two days, she has had inability to walk and an inability to move her legs. Sensation is intact however, she only has minimal movement of her toes, no movement in her legs. Patient was transferred here from [facility] to [facility] for MRI. Imagine revealed \"Extensive bony metastatic disease with diffuse areas of epidural spread of  tumor. Most significant from T10 through L2 with severe cord compression at L1 and superior aspect of L2 and associated pathologic fracture of L1. Soft tissue extension of tumor into the right paraspinous muscles at L1 with patchy bilateral muscular enhancement. \"  Patient underwent T11-L3 laminectomy and fusion surgery with resection of metastatic tumor on 22. She tolerated the procedure well. On POD#1, patient is resting comfortably in bed. Pain is severe with movement. She denies headache, chest pain, nausea, vomiting, and dyspnea. Objective:     Vital signs  Temp (24hrs), Av.9 °F (36.6 °C), Min:97.1 °F (36.2 °C), Max:98.7 °F (37.1 °C)   No intake/output data recorded.    1901 -  0700  In: 1500 [I.V.:1500]  Out: 3630 [Urine:1500; Drains:195]    Visit Vitals  BP (!) 137/96   Pulse 100   Temp 98 °F (36.7 °C)   Resp 16   Ht 5' 2\" (1.575 m)   Wt 94.8 kg (208 lb 15.9 oz)   SpO2 99%   BMI 38.23 kg/m²      O2 Device: None (Room air)       Voiding status: aguilera  Output (mL)  Last Bowel Movement Date: 22 (22 0800)     Pain control  Pain Assessment  Pain Scale 1: Numeric (0 - 10)  Pain Intensity 1: 5  Pain Onset 1: post op  Pain Location 1: Back  Pain Orientation 1: Lower,Mid  Pain Description 1: Aching  Pain Intervention(s) 1: Medication (see MAR)    PT/OT  Gait              Physical Exam:  Gen: No acute distress. Neuro: A&Ox3. Follows commands. Speech clear. Affect normal.  PERRL. EOMI. Face symmetric. Strength 5/5 in BUE. BLE hip flexors 0/5, knee extension is 1/5, ankle dorsi/plantar flexion 2/5. Wiggles toes bilaterally  Sensation intact. Gait deferred. Calves soft and supple; No pain with passive stretch  Skin: Incision C/D/I  Drain intact: output 150mL overnight    24 hour results:    Recent Results (from the past 24 hour(s))   RBC, ALLOCATE    Collection Time: 01/19/22  1:30 PM   Result Value Ref Range    HISTORY CHECKED?  Historical check performed    HEMOGLOBIN    Collection Time: 01/20/22  3:05 AM   Result Value Ref Range    HGB 8.6 (L) 11.5 - 16.0 g/dL        Assessment:     Active Problems:    Cord compression syndrome ([facility] [address] (1/19/2022)      Plan:     1) s/p T11-L3 laminectomy/fusion w resection of epidural spinal metastatic breast cancer  -PT/OT - in TLSO    -Pain control - scheduled tylenol, fentanyl patch, gabapentin, prn dilaudid   -Taper decadron   -Monitor drain output; d/c tomorrow AM if output <30ml over 8 hours   -Regular diet   -Keep aguilera for 2-3 days followed by voiding trial...may have to d/c w aguilera if not successful  2) Breast CA   -Dr. Lakshmi Keenan is primary oncologist   -Per oncology, further systemic tx will be dependent on tissue pathology   -Path pending   -Will need radiation 4-weeks after surgery  3) Hx of SVT   -Cont metoprolol    Plan d/w EMILIA Russell

## 2022-01-20 NOTE — PROGRESS NOTES
Problem: Falls - Risk of  Goal: *Absence of Falls  Description: Document Marissa Don Fall Risk and appropriate interventions in the flowsheet.   Outcome: Progressing Towards Goal  Note: Fall Risk Interventions:            Medication Interventions: Assess postural VS orthostatic hypotension,Patient to call before getting OOB,Teach patient to arise slowly    Elimination Interventions: Bed/chair exit alarm,Call light in reach,Patient to call for help with toileting needs,Stay With Me (per policy)

## 2022-01-20 NOTE — ANESTHESIA POSTPROCEDURE EVALUATION
Post-Anesthesia Evaluation and Assessment    Patient: Abdiel Tilley MRN: [id number]  SSN: [id number]    YOB: 1990  Age: 33 y.o. Sex: female      I have evaluated the patient and they are stable and ready for discharge from the PACU. Cardiovascular Function/Vital Signs  Visit Vitals  BP (!) 143/94   Pulse (!) 127   Temp 36.7 °C (98 °F)   Resp 12   Ht 5' 2\" (1.575 m)   Wt 94.8 kg (208 lb 15.9 oz)   SpO2 98%   BMI 38.23 kg/m²       Patient is status post General anesthesia for Procedure(s):  T11 - L2 LAMINECTOMY AND FUSION WITH O-ARM. Nausea/Vomiting: None    Postoperative hydration reviewed and adequate. Pain:  Pain Scale 1: Numeric (0 - 10) (01/19/22 1926)  Pain Intensity 1: 10 (01/19/22 1926)   Managed    Neurological Status:   Neuro (WDL): Exceptions to WDL (01/19/22 1750)  Neuro  Neurologic State: Alert;Drowsy (01/19/22 1824)  Orientation Level: Oriented X4 (01/19/22 0800)  Cognition: Appropriate decision making; Appropriate for age attention/concentration; Appropriate safety awareness; Follows commands (01/19/22 0800)  Speech: Clear (01/19/22 0800)  Assessment L Pupil: Brisk;Round (01/19/22 0800)  Size L Pupil (mm): 3 (01/19/22 0800)  Assessment R Pupil: Brisk;Round (01/19/22 0800)  Size R Pupil (mm): 3 (01/19/22 0800)  LUE Motor Response: Purposeful;Weak (01/19/22 1750)  LLE Motor Response: Purposeful;Weak (minimal purposeful movement/per MD pts baseline) (01/19/22 1750)  RUE Motor Response: Purposeful;Weak (01/19/22 1750)  RLE Motor Response: Purposeful;Weak (minimal movement/per MD pts baseline) (01/19/22 1750)   At baseline    Mental Status, Level of Consciousness: Alert and  oriented to person, place, and time    Pulmonary Status:   O2 Device: None (Room air) (01/19/22 1750)   Adequate oxygenation and airway patent    Complications related to anesthesia: None    Post-anesthesia assessment completed.  No concerns    Signed By: Danny Reddy MD     January 20, 2022 Procedure(s):  T11 - L2 LAMINECTOMY AND FUSION WITH O-ARM. general    <BSHSIANPOST>    INITIAL Post-op Vital signs:   Vitals Value Taken Time   /92 01/19/22 1800   Temp 36.7 °C (98.1 °F) 01/19/22 1750   Pulse 116 01/19/22 1805   Resp 17 01/19/22 1805   SpO2 97 % 01/19/22 1805   Vitals shown include unvalidated device data.

## 2022-01-20 NOTE — PROGRESS NOTES
Palliative Medicine  [place]: [phone])  [facility]: [phone])      Palliative Medicine consultation received and appreciated. The Palliative Medicine SW along with Dr. Orestes Youssef met with the patient at bedside- she is sleeping, drowsy, her sister is also present at bedside. Patient politely requested that we come back tomorrow, Palliative Medicine will follow-up on 1/21, allow patient to rest at this time.        Thank you for including Palliative Medicine in the care of Camila Alarcon[address]  [phone]

## 2022-01-20 NOTE — PROGRESS NOTES
Problem: Self Care Deficits Care Plan (Adult)  Goal: *Acute Goals and Plan of Care (Insert Text)  Description: FUNCTIONAL STATUS PRIOR TO ADMISSION: At baseline, patient was independent with ADLs, IADLs, and functional mobility/ ambulatory without AD. Was ambulatory with a RW for ~1 month PTA, then was unable to walk for a few days prior to admission. Endorses no falls. HOME SUPPORT: Patient resided with her 10 y/o son and did not require assistance at baseline. Is anticipating staying at her mother's house when she returns home. Occupational Therapy Goals  Initiated 1/20/2022  1. Patient will perform upper body dressing with supervision/set-up within 7 day(s). 2.  Patient will perform lower body dressing using AE with moderate assistance  within 7 day(s). 3.  Patient will perform sliding board transfer to [facility] with moderate assistance  within 7 day(s). 4.  Patient will perform all aspects of toileting with moderate assistance  within 7 day(s). 5.  Patient will participate in upper extremity therapeutic exercise/activities with supervision/set-up for 10 minutes within 7 day(s). 6.  Patient will don/doff back brace at supervision/set-up within 7 days. 7.  Patient will verbalize/demonstrate 3/3 back precautions during ADL tasks without cues within 7 days. Outcome: Not Met     OCCUPATIONAL THERAPY EVALUATION  Patient: Lilia Remy (83 y.o. female)  Date: 1/20/2022  Primary Diagnosis: Cord compression syndrome (HCC) [G95.20]  Procedure(s) (LRB):  T11 - L2 LAMINECTOMY AND FUSION WITH O-ARM (N/A) 1 Day Post-Op   Precautions: Fall,Back    ASSESSMENT  At baseline, patient was independent with ADLs, IADLs, and functional mobility/ ambulatory without AD. Was ambulatory with a RW for ~1 month PTA, then was unable to walk for a few days prior to admission with urinary incontinence.   Note imaging revealed spinal metastatic disease, spinal fractures, and severe cord compression and patient is now POD 1 above surgery. Today patient cleared to mobilize per neurosurgery with recommendation for off the shelf TLSO for OOB. Patient presents for OT evaluation with severe BLE weakness (see PT note, non-functional), generally impaired BUE AROM/ strength/ coordination (d/t pain), impaired functional reach for UB < LB ADLs, impaired sitting balance, and severe surgical pain. Recommend timing following sessions at highest pain medication effectiveness. Patient participated well despite pain. Achieved sitting EOB with x2 assistance and maintained static sitting balance but required BUE stabilization at EOB for support. Patient is significantly below functional baseline. Recommend inpatient rehab at d/c. Current Level of Function Impacting Discharge (ADLs/self-care): Required maximum assistance x2 for supine<>sit. Infer overall set-up to moderate assistance UB ADLs and total assistance LB ADLs    Functional Outcome Measure: The patient scored 20/100 on the Barthel Index outcome measure which is indicative of ~80% impairment in functional performance. Patient will benefit from skilled therapy intervention to address the above noted impairments. PLAN :  Recommendations and Planned Interventions: self care training, functional mobility training, therapeutic exercise, balance training, therapeutic activities, endurance activities, patient education, home safety training, and family training/education    Frequency/Duration: Patient will be followed by occupational therapy 5 times a week to address goals. Recommendation for discharge: (in order for the patient to meet his/her long term goals)  Therapy 3 hours per day 5-7 days per week    This discharge recommendation:  Has been made in collaboration with the attending provider and/or case management       SUBJECTIVE:   Patient stated It hurts so much.     OBJECTIVE DATA SUMMARY:   HISTORY:   History reviewed. No pertinent past medical history. No past surgical history on file. Expanded or extensive additional review of patient history:     Home Situation  Home Environment: Private residence  # Steps to Enter: 3  Rails to Enter: Yes  Hand Rails : Left  One/Two Story Residence: Two story  # of Interior Steps: 13  Interior Rails: Left  Living Alone: No  Support Systems: Parent(s)  Patient Expects to be Discharged to[de-identified] Other: (IPR vs HH)  Current DME Used/Available at Home: Cane, straight,Walker, rolling  Tub or Shower Type: Shower    EXAMINATION OF PERFORMANCE DEFICITS:  Cognitive/Behavioral Status:  Neurologic State: Alert  Orientation Level: Oriented X4        Skin: visible skin appears intact    Edema: none noted    Hearing: WDL    Vision/Perceptual:                           Acuity: Within Defined Limits         Range of Motion:    AROM: Grossly decreased, non-functional  PROM: Grossly decreased, non-functional                      Strength:    Strength: Grossly decreased, non-functional                Coordination:  Coordination: Grossly decreased, non-functional  Fine Motor Skills-Upper: Left Intact; Right Intact (UEs)    Gross Motor Skills-Upper: Left Intact; Right Intact    Tone & Sensation:    Tone: Normal  Sensation: Intact                      Balance:  Sitting: Impaired  Sitting - Static: Good (unsupported)  Sitting - Dynamic: Fair (occasional)    Functional Mobility and Transfers for ADLs:  Bed Mobility:  Supine to Sit: Maximum assistance;Assist x2  Sit to Supine: Maximum assistance;Assist x2  Scooting: Total assistance;Assist x2        ADL Assessment:  Feeding: Independent (inferred)    Oral Facial Hygiene/Grooming: Setup (inferred)    Bathing: Moderate assistance (inferred for precautions, pain, impaired reach)    Upper Body Dressing: Moderate assistance (inferred for pain, reach)    Lower Body Dressing: Total assistance (for socks and inferred overall for precautions, pain, reach)    Toileting:  Total assistance (inferred)              Functional Measure:    Barthel Index:  Bathin  Bladder: 0  Bowels: 0  Groomin  Dressin  Feeding: 10  Mobility: 0  Stairs: 0  Toilet Use: 0  Transfer (Bed to Chair and Back): 5  Total: 20/100      The Barthel ADL Index: Guidelines  1. The index should be used as a record of what a patient does, not as a record of what a patient could do. 2. The main aim is to establish degree of independence from any help, physical or verbal, however minor and for whatever reason. 3. The need for supervision renders the patient not independent. 4. A patient's performance should be established using the best available evidence. Asking the patient, friends/relatives and nurses are the usual sources, but direct observation and common sense are also important. However direct testing is not needed. 5. Usually the patient's performance over the preceding 24-48 hours is important, but occasionally longer periods will be relevant. 6. Middle categories imply that the patient supplies over 50 per cent of the effort. 7. Use of aids to be independent is allowed. Score Interpretation (from [address] [address])    Independent   60-79 Minimally independent   40-59 Partially dependent   20-39 Very dependent   <20 Totally dependent     -Travis Caruso., Barthel, D.W. (1965). Functional evaluation: the Barthel Index. [facility] ([address] (1997). The Barthel activities of daily living index: self-reporting versus actual performance in the old (> or = 75 years). Journal of [address] [place] [address] 45(7), 14 [facility], ..M., [facility]., Rajesh Paris. (). Measuring the change in disability after inpatient rehabilitation; comparison of the responsiveness of the Barthel Index and Functional [place] Measure. Journal of Neurology, Neurosurgery, and Psychiatry, 66(4), 023-245.   -Wendi Bender, N.J.A, ELIAN Reynoso, & Catherine Mahoney M.A. (2004) Assessment of post-stroke quality of life in cost-effectiveness studies: The usefulness of the Barthel Index and the EuroQoL-5D. Quality of Life Research, 15, 212-78         Occupational Therapy Evaluation Charge Determination   History Examination Decision-Making   LOW Complexity : Brief history review  MEDIUM Complexity : 3-5 performance deficits relating to physical, cognitive , or psychosocial skils that result in activity limitations and / or participation restrictions MEDIUM Complexity : Patient may present with comorbidities that affect occupational performnce. Miniml to moderate modification of tasks or assistance (eg, physical or verbal ) with assesment(s) is necessary to enable patient to complete evaluation       Based on the above components, the patient evaluation is determined to be of the following complexity level: LOW   Pain Rating:  Patient reported severe surgical back pain, RN aware and provided pain medication    Activity Tolerance:   Fair    After treatment patient left in no apparent distress:    Supine in bed, Call bell within reach, and Caregiver / family present    COMMUNICATION/EDUCATION:   The patients plan of care was discussed with: Physical therapist, Registered nurse, Physician, and Case management. Home safety education was provided and the patient/caregiver indicated understanding., Patient/family have participated as able in goal setting and plan of care. , and Patient/family agree to work toward stated goals and plan of care. This patients plan of care is appropriate for delegation to [facility].     Thank you for this referral.  Jason Gonzalez OT  Time Calculation: 34 mins

## 2022-01-21 LAB
ANION GAP SERPL CALC-SCNC: 2 MMOL/L (ref 5–15)
BUN SERPL-MCNC: 12 MG/DL (ref 6–20)
BUN/CREAT SERPL: 23 (ref 12–20)
CALCIUM SERPL-MCNC: 9 MG/DL (ref 8.5–10.1)
CHLORIDE SERPL-SCNC: 103 MMOL/L (ref 97–108)
CO2 SERPL-SCNC: 29 MMOL/L (ref 21–32)
CREAT SERPL-MCNC: 0.52 MG/DL (ref 0.55–1.02)
ERYTHROCYTE [DISTWIDTH] IN BLOOD BY AUTOMATED COUNT: 13.6 % (ref 11.5–14.5)
GLUCOSE SERPL-MCNC: 107 MG/DL (ref 65–100)
HCT VFR BLD AUTO: 26.6 % (ref 35–47)
HGB BLD-MCNC: 8.3 G/DL (ref 11.5–16)
MAGNESIUM SERPL-MCNC: 2.1 MG/DL (ref 1.6–2.4)
MCH RBC QN AUTO: 26 PG (ref 26–34)
MCHC RBC AUTO-ENTMCNC: 31.2 G/DL (ref 30–36.5)
MCV RBC AUTO: 83.4 FL (ref 80–99)
NRBC # BLD: 0.02 K/UL (ref 0–0.01)
NRBC BLD-RTO: 0.2 PER 100 WBC
PLATELET # BLD AUTO: 274 K/UL (ref 150–400)
PMV BLD AUTO: 10 FL (ref 8.9–12.9)
POTASSIUM SERPL-SCNC: 4.2 MMOL/L (ref 3.5–5.1)
RBC # BLD AUTO: 3.19 M/UL (ref 3.8–5.2)
SODIUM SERPL-SCNC: 134 MMOL/L (ref 136–145)
WBC # BLD AUTO: 10 K/UL (ref 3.6–11)

## 2022-01-21 PROCEDURE — 99221 1ST HOSP IP/OBS SF/LOW 40: CPT | Performed by: FAMILY MEDICINE

## 2022-01-21 PROCEDURE — 74011000250 HC RX REV CODE- 250: Performed by: STUDENT IN AN ORGANIZED HEALTH CARE EDUCATION/TRAINING PROGRAM

## 2022-01-21 PROCEDURE — 74011250637 HC RX REV CODE- 250/637: Performed by: PHYSICIAN ASSISTANT

## 2022-01-21 PROCEDURE — 74011250637 HC RX REV CODE- 250/637: Performed by: HOSPITALIST

## 2022-01-21 PROCEDURE — 80048 BASIC METABOLIC PNL TOTAL CA: CPT

## 2022-01-21 PROCEDURE — 74011250636 HC RX REV CODE- 250/636: Performed by: PHYSICIAN ASSISTANT

## 2022-01-21 PROCEDURE — 94760 N-INVAS EAR/PLS OXIMETRY 1: CPT

## 2022-01-21 PROCEDURE — 36415 COLL VENOUS BLD VENIPUNCTURE: CPT

## 2022-01-21 PROCEDURE — 74011250637 HC RX REV CODE- 250/637: Performed by: SPECIALIST

## 2022-01-21 PROCEDURE — 85027 COMPLETE CBC AUTOMATED: CPT

## 2022-01-21 PROCEDURE — 65660000000 HC RM CCU STEPDOWN

## 2022-01-21 PROCEDURE — 97535 SELF CARE MNGMENT TRAINING: CPT

## 2022-01-21 PROCEDURE — 97530 THERAPEUTIC ACTIVITIES: CPT

## 2022-01-21 PROCEDURE — 74011000250 HC RX REV CODE- 250: Performed by: SPECIALIST

## 2022-01-21 PROCEDURE — 83735 ASSAY OF MAGNESIUM: CPT

## 2022-01-21 PROCEDURE — 74011250636 HC RX REV CODE- 250/636: Performed by: HOSPITALIST

## 2022-01-21 RX ORDER — CYCLOBENZAPRINE HCL 10 MG
5 TABLET ORAL 2 TIMES DAILY
Status: DISCONTINUED | OUTPATIENT
Start: 2022-01-21 | End: 2022-01-26

## 2022-01-21 RX ORDER — FACIAL-BODY WIPES
10 EACH TOPICAL DAILY PRN
Status: DISCONTINUED | OUTPATIENT
Start: 2022-01-21 | End: 2022-02-01 | Stop reason: HOSPADM

## 2022-01-21 RX ORDER — DEXAMETHASONE 1 MG/1
1 TABLET ORAL EVERY 12 HOURS
Status: COMPLETED | OUTPATIENT
Start: 2022-01-22 | End: 2022-01-23

## 2022-01-21 RX ORDER — DEXAMETHASONE 1 MG/1
1 TABLET ORAL DAILY
Status: DISCONTINUED | OUTPATIENT
Start: 2022-01-24 | End: 2022-01-24

## 2022-01-21 RX ORDER — GABAPENTIN 300 MG/1
300 CAPSULE ORAL 3 TIMES DAILY
Status: DISCONTINUED | OUTPATIENT
Start: 2022-01-21 | End: 2022-01-26

## 2022-01-21 RX ORDER — DEXAMETHASONE 1 MG/1
2 TABLET ORAL EVERY 12 HOURS
Status: COMPLETED | OUTPATIENT
Start: 2022-01-21 | End: 2022-01-22

## 2022-01-21 RX ORDER — DEXAMETHASONE 4 MG/1
4 TABLET ORAL EVERY 12 HOURS
Status: COMPLETED | OUTPATIENT
Start: 2022-01-21 | End: 2022-01-21

## 2022-01-21 RX ADMIN — GABAPENTIN 300 MG: 300 CAPSULE ORAL at 09:16

## 2022-01-21 RX ADMIN — GABAPENTIN 300 MG: 300 CAPSULE ORAL at 21:00

## 2022-01-21 RX ADMIN — HYDROMORPHONE HYDROCHLORIDE 2 MG: 2 INJECTION INTRAMUSCULAR; INTRAVENOUS; SUBCUTANEOUS at 10:36

## 2022-01-21 RX ADMIN — ACETAMINOPHEN 650 MG: 325 TABLET ORAL at 17:37

## 2022-01-21 RX ADMIN — ACETAMINOPHEN 650 MG: 325 TABLET ORAL at 01:15

## 2022-01-21 RX ADMIN — HYDROMORPHONE HYDROCHLORIDE 2 MG: 2 INJECTION INTRAMUSCULAR; INTRAVENOUS; SUBCUTANEOUS at 06:56

## 2022-01-21 RX ADMIN — SODIUM CHLORIDE, PRESERVATIVE FREE 10 ML: 5 INJECTION INTRAVENOUS at 21:00

## 2022-01-21 RX ADMIN — HYDROMORPHONE HYDROCHLORIDE 2 MG: 2 TABLET ORAL at 23:25

## 2022-01-21 RX ADMIN — DEXAMETHASONE 4 MG: 4 TABLET ORAL at 09:16

## 2022-01-21 RX ADMIN — ACETAMINOPHEN 650 MG: 325 TABLET ORAL at 11:50

## 2022-01-21 RX ADMIN — METOPROLOL TARTRATE 25 MG: 25 TABLET, FILM COATED ORAL at 20:43

## 2022-01-21 RX ADMIN — DEXAMETHASONE 2 MG: 1 TABLET ORAL at 20:43

## 2022-01-21 RX ADMIN — GABAPENTIN 300 MG: 300 CAPSULE ORAL at 15:47

## 2022-01-21 RX ADMIN — CYCLOBENZAPRINE 5 MG: 10 TABLET, FILM COATED ORAL at 09:16

## 2022-01-21 RX ADMIN — SODIUM CHLORIDE, PRESERVATIVE FREE 10 ML: 5 INJECTION INTRAVENOUS at 15:47

## 2022-01-21 RX ADMIN — SODIUM CHLORIDE, PRESERVATIVE FREE 10 ML: 5 INJECTION INTRAVENOUS at 05:37

## 2022-01-21 RX ADMIN — HYDROMORPHONE HYDROCHLORIDE 1 MG: 2 TABLET ORAL at 15:52

## 2022-01-21 RX ADMIN — POLYETHYLENE GLYCOL 3350 17 G: 17 POWDER, FOR SOLUTION ORAL at 09:16

## 2022-01-21 RX ADMIN — METOPROLOL TARTRATE 25 MG: 25 TABLET, FILM COATED ORAL at 09:16

## 2022-01-21 RX ADMIN — ACETAMINOPHEN 650 MG: 325 TABLET ORAL at 05:36

## 2022-01-21 RX ADMIN — SODIUM CHLORIDE, PRESERVATIVE FREE 10 ML: 5 INJECTION INTRAVENOUS at 01:15

## 2022-01-21 RX ADMIN — CYCLOBENZAPRINE 5 MG: 10 TABLET, FILM COATED ORAL at 17:37

## 2022-01-21 RX ADMIN — ACETAMINOPHEN 650 MG: 325 TABLET ORAL at 23:25

## 2022-01-21 RX ADMIN — SENNOSIDES AND DOCUSATE SODIUM 1 TABLET: 50; 8.6 TABLET ORAL at 09:16

## 2022-01-21 NOTE — PROGRESS NOTES
Hematology-Oncology Progress Note      Devon Sinclair  1990  [id number]  1/21/2022      Subjective:     Moving toes. Hungry. Pain control better today. Allergies: Patient has no known allergies.   Current Facility-Administered Medications   Medication Dose Route Frequency Provider Last Rate Last Admin    HYDROmorphone (PF) (DILAUDID) injection 2 mg  2 mg IntraVENous Q3H PRN Buddy Savage MD   2 mg at 01/21/22 0656    gabapentin (NEURONTIN) capsule 200 mg  200 mg Oral TID Buddy Savage MD   200 mg at 01/20/22 2120    senna-docusate (PERICOLACE) 8.6-50 mg per tablet 1 Tablet  1 Tablet Oral DAILY Buddy Savage MD   1 Tablet at 01/20/22 1111    dexAMETHasone (DECADRON) tablet 4 mg  4 mg Oral Q12H EMILIA Ramirez   4 mg at 01/20/22 2119    HYDROmorphone (DILAUDID) tablet 1 mg  1 mg Oral Q4H PRN EMILIA Ramirez        HYDROmorphone (DILAUDID) tablet 2 mg  2 mg Oral Q4H PRN EMILIA Yoon   2 mg at 01/20/22 1356    polyethylene glycol (MIRALAX) packet 17 g  17 g Oral DAILY Liz Orellana PA        sodium chloride (NS) flush 5-40 mL  5-40 mL IntraVENous Q8H Mary Beth Pringle MD   10 mL at 01/21/22 0537    sodium chloride (NS) flush 5-40 mL  5-40 mL IntraVENous PRN Mary Beth Pringle MD        acetaminophen (TYLENOL) tablet 650 mg  650 mg Oral Q6H PRN Mary Beth Pringle MD        Or    acetaminophen (TYLENOL) suppository 650 mg  650 mg Rectal Q6H PRN Mary Beth Pringle MD        ondansetron (ZOFRAN ODT) tablet 4 mg  4 mg Oral Q8H PRN Benjamin Johnson MD        Or    ondansetron [facility]) injection 4 mg  4 mg IntraVENous Q6H PRN Benjamin Johnson MD        metoprolol tartrate (LOPRESSOR) tablet 25 mg  25 mg Oral Q12H Benjamin Johnson MD   25 mg at 01/20/22 2120    fentaNYL (DURAGESIC) 50 mcg/hr patch 1 Patch  1 Patch TransDERmal Q72H Benjamin Johnson MD   1 Patch at 01/20/22 1377    0.9% sodium chloride infusion 250 mL  250 mL IntraVENous PRN MD Oskar Baptiste Lasso sodium chloride (NS) flush 5-40 mL  5-40 mL IntraVENous Q8H Meme Brasher MD   10 mL at 01/21/22 0537    sodium chloride (NS) flush 5-40 mL  5-40 mL IntraVENous PRNEY Brasher MD        acetaminophen (TYLENOL) tablet 650 mg  650 mg Oral Q6H Meme Brasher MD   650 mg at 01/21/22 0536    naloxone [facility]) injection 0.4 mg  0.4 mg IntraVENous PRNEY Brasher MD        diphenhydrAMINE (BENADRYL) capsule 25 mg  25 mg Oral Q6H PRNEY Brasher MD        phenol throat spray (CHLORASEPTIC) 1 Spray  1 Redig Oral PENNIE Brasher MD        benzocaine-menthoL (CEPACOL) lozenge 1 Lozenge  1 Lozenge Oral PENNIE Brasher MD   1 Lozenge at 01/20/22 0306     Objective:     Patient Vitals for the past 24 hrs:   BP Temp Pulse Resp SpO2 Weight   01/21/22 0600 -- -- 88 -- -- --   01/21/22 0546 134/84 98.1 °F (36.7 °C) 88 14 100 % --   01/21/22 0539 -- -- -- -- -- 95 kg (209 lb 7 oz)   01/21/22 0400 -- -- 89 -- -- --   01/21/22 0200 -- -- 90 -- -- --   01/21/22 0148 (!) 149/93 98.1 °F (36.7 °C) 92 20 97 % --   01/20/22 2200 -- -- 97 -- -- --   01/20/22 2120 (!) 145/98 98.4 °F (36.9 °C) (!) 101 18 99 % --   01/20/22 2000 -- -- 99 -- -- --   01/20/22 1801 (!) 152/92 98.2 °F (36.8 °C) 92 15 100 % --   01/20/22 1800 -- -- 99 -- -- --   01/20/22 1400 -- -- 97 -- -- --   01/20/22 1345 (!) 156/98 99 °F (37.2 °C) 99 19 99 % --   01/20/22 1200 -- -- 100 -- -- --   01/20/22 1114 (!) 137/96 98 °F (36.7 °C) 93 16 99 % --   01/20/22 1000 -- -- (!) 110 -- -- --   01/20/22 0924 (!) 143/94 -- (!) 127 -- -- --       Gen: NAD  HEENT: PERRL, Sclerae anicteric  Cv: RRR without m/r/g  Pulm: CTA bilaterally  Abd: NABS, NTND, No HSM  Ext: No c/c/e    Available labs reviewed:  Labs:    Recent Results (from the past 24 hour(s))   CBC W/O DIFF    Collection Time: 01/21/22  1:17 AM   Result Value Ref Range    WBC 10.0 3.6 - 11.0 K/uL    RBC 3.19 (L) 3.80 - 5.20 M/uL    HGB 8.3 (L) 11.5 - 16.0 g/dL    HCT 26.6 (L) 35.0 - 47.0 % MCV 83.4 80.0 - 99.0 FL    MCH 26.0 26.0 - 34.0 PG    MCHC 31.2 30.0 - 36.5 g/dL    RDW 13.6 11.5 - 14.5 %    PLATELET 721 499 - 744 K/uL    MPV 10.0 8.9 - 12.9 FL    NRBC 0.2 (H) 0  WBC    ABSOLUTE NRBC 0.02 (H) 0.00 - 9.32 K/uL   METABOLIC PANEL, BASIC    Collection Time: 01/21/22  1:17 AM   Result Value Ref Range    Sodium 134 (L) 136 - 145 mmol/L    Potassium 4.2 3.5 - 5.1 mmol/L    Chloride 103 97 - 108 mmol/L    CO2 29 21 - 32 mmol/L    Anion gap 2 (L) 5 - 15 mmol/L    Glucose 107 (H) 65 - 100 mg/dL    BUN 12 6 - 20 MG/DL    Creatinine 0.52 (L) 0.55 - 1.02 MG/DL    BUN/Creatinine ratio 23 (H) 12 - 20      GFR est AA >60 >60 ml/min/1.73m2    GFR est non-AA >60 >60 ml/min/1.73m2    Calcium 9.0 8.5 - 10.1 MG/DL   MAGNESIUM    Collection Time: 01/21/22  1:17 AM   Result Value Ref Range    Magnesium 2.1 1.6 - 2.4 mg/dL         Assessment and Plan     32 y/o woman with stage IV breast CA on presentation, initial biopsy ER+/Her-2 negative. 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 Treated with docetaxel/pertuzumab/trastuzuamb initially, then maintained on Lupron/Carlin. Now with worsening bony disease and lumbar SCC s/p laminectomy. 1. SCC: Some improvement in neurologic function. Subsequent therapy will depend on SCC pathology--if Her-2 positive, likely TDM1 vs trastuzumab deruxtecan. If Her-2 negative, may be a candidate for [facility]. Ultimately defer to Dr. Consuelo Palacios for final recs. Of course, no treatment in the post-op period. Will ask weekend rounder to see once for questions, but subsequent care will be delivered in the outpatient setting. Appreciate excellent NSG/Hospitalist care. Thank you for allowing us to participate in the care of this very pleasant patient.     Sheldon Bradley MD  Hematology/Oncology  Phone [phone]

## 2022-01-21 NOTE — CONSULTS
Palliative Medicine Consult  Lb: [phone] ([phone])    Patient Name: Maude Montesinos  YOB: 1990    Date of Initial Consult: 1/21/22  Reason for Consult: Overwhelming symptoms/Care decisions  Requesting Provider: Dr. Sarah Funes   Primary Care Physician: Unknown, Provider, DPJOEL     SUMMARY:   Maude Montesinos is a 33 y.o. 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 She underwent surgery for this including T11-L3 laminectomy on 1/19. She is receiving dexamethasone. Final pathology and subsequent cancer treatment options are pending. Current medical issues leading to Palliative Medicine involvement include: Care Decisions. Social:  She has a 9year old son. Parents live locally. She has good support from her Faith. PALLIATIVE DIAGNOSES:   1. Encounter for Palliative Care  2. Advance Care Planning Discussion  3. Goals of Care Discussion     PLAN:   1. I met with patient this afternoon. Introduced myself and the role of Palliative Medicine team.  2. Patient states her pain fluctuates in her back and legs but that the pain medications are helping and keeping things manageable. 3. She has good support from family and Faith. 4. She does not have an AMD. She is comfortable having her parents help her with medical decisions as needed. Right now she can make and express her own wishes. 5. She is awaiting to learn of her treatment options at this time--biopsy pending. 6. Offered to help support her in any way we can while she is here. Offered to have our team  visit next week, and she is amenable to this. 7. Thank you for asking our team to participate in the care of Maude Montesinos. 8. Initial consult note routed to primary continuity provider and/or primary health care team members  9.  Communicated plan of care with: Palliative IDT, [address] Team     GOALS OF CARE / TREATMENT PREFERENCES:     GOALS OF CARE:       TREATMENT PREFERENCES:   Code Status: Full Code    Patient and family's personal goals include: continue to improve strength and learn of treatment options    Important upcoming milestones or family events: The patient identifies the following as important for living well: family and Catholic      Advance Care Planning:  [] The [place] [id number] Team has updated the ACP Navigator with [address] and Patient Capacity      No flowsheet data found. Other:    As far as possible, the palliative care team has discussed with patient / health care proxy about goals of care / treatment preferences for patient. HISTORY:     History obtained from: chart, patient    CHIEF COMPLAINT: leg pain    HPI/SUBJECTIVE:    The patient is:   [x] Verbal and participatory  [] Non-participatory due to:     1/21: patient is awake and able to participate in conversation. Feels pain is overall managed at this time.      Clinical Pain Assessment (nonverbal scale for severity on nonverbal patients):   Clinical Pain Assessment  Severity: 3  Location: legs  Character: aching and sharp  Duration: varies  Effect: discomfort  Factors: cancer  Frequency: daily, fluctuates     Activity (Movement): Lying quietly, normal position    Duration: for how long has pt been experiencing pain (e.g., 2 days, 1 month, years)  Frequency: how often pain is an issue (e.g., several times per day, once every few days, constant)     FUNCTIONAL ASSESSMENT:     Palliative Performance Scale (PPS):  PPS: 40       PSYCHOSOCIAL/SPIRITUAL SCREENING:     Palliative IDT has assessed this patient for cultural preferences / practices and a referral made as appropriate to needs (Cultural Services, Patient Advocacy, Ethics, etc.)    Any spiritual / Mandaeism concerns:  [] Yes /  [x] No   If \"Yes\" to discuss with pastoral care during IDT     Does caregiver feel burdened by caring for their loved one:   [] Yes /  [x] No /  [] No Caregiver Present    If \"Yes\" to discuss with social work during IDT    Anticipatory grief assessment:   [x] Normal  / [] Maladaptive     If \"Maladaptive\" to discuss with social work during IDT    ESAS Anxiety: Anxiety: 0    ESAS Depression:          REVIEW OF SYSTEMS:     Positive and pertinent negative findings in ROS are noted above in HPI. The following systems were [x] reviewed / [] unable to be reviewed as noted in HPI  Other findings are noted below. Systems: constitutional, ears/nose/mouth/throat, respiratory, gastrointestinal, genitourinary, musculoskeletal, integumentary, neurologic, psychiatric, endocrine. Positive findings noted below. Modified ESAS Completed by: provider   Fatigue: 0       Pain: 3   Anxiety: 0       Dyspnea: 0                    PHYSICAL EXAM:     From RN flowsheet:  Wt Readings from Last 3 Encounters:   01/21/22 209 lb 7 oz (95 kg)     Blood pressure 136/86, pulse (!) 105, temperature 98.6 °F (37 °C), resp. rate 18, height 5' 2\" (1.575 m), weight 209 lb 7 oz (95 kg), SpO2 97 %. Pain Scale 1: Numeric (0 - 10)  Pain Intensity 1: 5  Pain Onset 1: post op  Pain Location 1: Back  Pain Orientation 1: Mid,Lower  Pain Description 1: Aching  Pain Intervention(s) 1: Medication (see MAR)  Last bowel movement, if known:     Constitutional: sitting up in hospital bed, awake, NAD  Eyes: pupils equal, anicteric  ENMT: no nasal discharge, moist mucous membranes  Respiratory: breathing not labored on RA, normal rate  Skin: warm, no diaphoresis  Neurologic: following commands, moving arms  Psychiatric: full affect, no restlessness or agitation  Other:       HISTORY:     Active Problems:    Cord compression syndrome ([address] (1/19/2022)      History reviewed. No pertinent past medical history. No past surgical history on file. History reviewed. No pertinent family history. History reviewed, no pertinent family history.   Social History     Tobacco Use    Smoking status: Not on file    Smokeless tobacco: Not on file   Substance Use Topics    Alcohol use: Not on file     No Known Allergies   Current Facility-Administered Medications   Medication Dose Route Frequency    dexAMETHasone (DECADRON) tablet 2 mg  2 mg Oral Q12H    [START ON 1/22/2022] dexAMETHasone (DECADRON) tablet 1 mg  1 mg Oral Q12H    [START ON 1/24/2022] dexAMETHasone (DECADRON) tablet 1 mg  1 mg Oral DAILY    gabapentin (NEURONTIN) capsule 300 mg  300 mg Oral TID    cyclobenzaprine (FLEXERIL) tablet 5 mg  5 mg Oral BID    bisacodyL (DULCOLAX) suppository 10 mg  10 mg Rectal DAILY PRN    HYDROmorphone (PF) (DILAUDID) injection 2 mg  2 mg IntraVENous Q3H PRN    senna-docusate (PERICOLACE) 8.6-50 mg per tablet 1 Tablet  1 Tablet Oral DAILY    HYDROmorphone (DILAUDID) tablet 1 mg  1 mg Oral Q4H PRN    HYDROmorphone (DILAUDID) tablet 2 mg  2 mg Oral Q4H PRN    polyethylene glycol (MIRALAX) packet 17 g  17 g Oral DAILY    sodium chloride (NS) flush 5-40 mL  5-40 mL IntraVENous Q8H    sodium chloride (NS) flush 5-40 mL  5-40 mL IntraVENous PRN    acetaminophen (TYLENOL) tablet 650 mg  650 mg Oral Q6H PRN    Or    acetaminophen (TYLENOL) suppository 650 mg  650 mg Rectal Q6H PRN    ondansetron (ZOFRAN ODT) tablet 4 mg  4 mg Oral Q8H PRN    Or    ondansetron (ZOFRAN) injection 4 mg  4 mg IntraVENous Q6H PRN    metoprolol tartrate (LOPRESSOR) tablet 25 mg  25 mg Oral Q12H    fentaNYL (DURAGESIC) 50 mcg/hr patch 1 Patch  1 Patch TransDERmal Q72H    0.9% sodium chloride infusion 250 mL  250 mL IntraVENous PRN    sodium chloride (NS) flush 5-40 mL  5-40 mL IntraVENous Q8H    sodium chloride (NS) flush 5-40 mL  5-40 mL IntraVENous PRN    acetaminophen (TYLENOL) tablet 650 mg  650 mg Oral Q6H    naloxone (NARCAN) injection 0.4 mg  0.4 mg IntraVENous PRN    diphenhydrAMINE (BENADRYL) capsule 25 mg  25 mg Oral Q6H PRN    phenol throat spray (CHLORASEPTIC) 1 Spray  1 Spray Oral PRN    benzocaine-menthoL (CEPACOL) lozenge 1 Lozenge  1 Lozenge Oral PRN          LAB AND IMAGING FINDINGS:     Lab Results   Component Value Date/Time    WBC 10.0 01/21/2022 01:17 AM    HGB 8.3 (L) 01/21/2022 01:17 AM    PLATELET 266 15/15/0946 01:17 AM     Lab Results   Component Value Date/Time    Sodium 134 (L) 01/21/2022 01:17 AM    Potassium 4.2 01/21/2022 01:17 AM    Chloride 103 01/21/2022 01:17 AM    CO2 29 01/21/2022 01:17 AM    BUN 12 01/21/2022 01:17 AM    Creatinine 0.52 (L) 01/21/2022 01:17 AM    Calcium 9.0 01/21/2022 01:17 AM    Magnesium 2.1 01/21/2022 01:17 AM      Lab Results   Component Value Date/Time    Alk. phosphatase 103 01/19/2022 07:14 AM    Protein, total 7.6 01/19/2022 07:14 AM    Albumin 3.1 (L) 01/19/2022 07:14 AM    Globulin 4.5 (H) 01/19/2022 07:14 AM     No results found for: INR, PTMR, PTP, PT1, PT2, APTT, INREXT, INREXT   No results found for: IRON, FE, TIBC, IBCT, PSAT, FERR   No results found for: PH, PCO2, PO2  No components found for: GLPOC   No results found for: CPK, CKMB             Total time:   Counseling / coordination time, spent as noted above:   > 50% counseling / coordination?:     Prolonged service was provided for  []30 min   []75 min in face to face time in the presence of the patient, spent as noted above. Time Start:   Time End:   Note: this can only be billed with 57588 (initial) or 60742 (follow up). If multiple start / stop times, list each separately.

## 2022-01-21 NOTE — PROGRESS NOTES
Problem: Mobility Impaired (Adult and Pediatric)  Goal: *Acute Goals and Plan of Care (Insert Text)  Description: FUNCTIONAL STATUS PRIOR TO ADMISSION: Patient was independent and active without use of DME. She has history of breast cancer with mets to the spine. HOME SUPPORT PRIOR TO ADMISSION: Pt lives with her 9year old son. Her mother is also involved in her care. Physical Therapy Goals  Initiated 1/20/2022    1. Patient will move from supine to sit and sit to supine , scoot up and down, and roll side to side in bed with moderate assistance  within 4 days. 2. Patient will perform sit to stand with maximal assistance within 4 days. 3. Patient will ambulate with maximal assistance for 5 feet with the least restrictive device within 4 days. 4. Patient will verbalize and demonstrate understanding of spinal precautions (No bending, lifting greater than 5 lbs, or twisting; log-roll technique; frequent repositioning as instructed) within 4 days. Outcome: Progressing Towards Goal  PHYSICAL THERAPY TREATMENT  Patient: Ursula Stahl (3 y.o. female)  Date: 1/21/2022  Diagnosis: Cord compression syndrome (HCC) [G95.20] <principal problem not specified>  Procedure(s) (LRB):  T11 - L2 LAMINECTOMY AND FUSION WITH O-ARM (N/A) 2 Days Post-Op  Precautions: Fall,Back  Chart, physical therapy assessment, plan of care and goals were reviewed. ASSESSMENT  Patient continues with skilled PT services and is progressing towards goals, seen for PM PT session. Continues to present with pain, impaired balance, decreased activity tolerance, and BLE weakness. Pt received in chair, agreeable and motivated to work with therapy. Pt scooted bed>chair with maxA x2 people. Tolerated static sitting balance for 10 minutes to allow for custom brace fitting. Returned supine with maxA x2 utilizing log roll technique. Continuing to recommend IPR upon discharge.      Current Level of Function Impacting Discharge (mobility/balance): maxA x2 for transfers     Other factors to consider for discharge: pain, PMH (breat cancer with spine mets), supportive family, motivated          PLAN :  Patient continues to benefit from skilled intervention to address the above impairments. Continue treatment per established plan of care. to address goals. Recommendation for discharge: (in order for the patient to meet his/her long term goals)  Therapy 3 hours per day 5-7 days per week    This discharge recommendation:  Has been made in collaboration with the attending provider and/or case management    IF patient discharges home will need the following DME: to be determined (TBD)       SUBJECTIVE:   Patient stated Hector wagner for helping me.     OBJECTIVE DATA SUMMARY:   Critical Behavior:  Neurologic State: Alert,Eyes open spontaneously  Orientation Level: Oriented X4  Cognition: Appropriate decision making,Follows commands     Functional Mobility Training:  Bed Mobility:  Supine to Sit: Maximum assistance;Assist x2  Sit to Supine: Maximum assistance;Assist x2  Scooting: Maximum assistance;Assist x2        Transfers:  Sit to Stand: Maximum assistance;Assist x2  Stand to Sit: Maximum assistance;Assist x2  Bed to Chair: Maximum assistance;Assist x2    Balance:  Sitting: Impaired  Sitting - Static: Good (unsupported)  Sitting - Dynamic: Fair (occasional)  Standing: Impaired; With support  Standing - Static: Poor;Constant support  Standing - Dynamic : Poor;Constant support    Activity Tolerance:   Fair    After treatment patient left in no apparent distress:   Supine in bed, Call bell within reach, and Caregiver / family present    COMMUNICATION/COLLABORATION:   The patients plan of care was discussed with: Occupational therapist, Registered nurse, Case management, and PA and orthotist .     Darrell Myers, PT, DPT   Time Calculation: 38 mins

## 2022-01-21 NOTE — PROGRESS NOTES
Problem: Falls - Risk of  Goal: *Absence of Falls  Description: Document Kala Nap Fall Risk and appropriate interventions in the flowsheet. Outcome: Progressing Towards Goal  Note: Fall Risk Interventions:  Mobility Interventions: Communicate number of staff needed for ambulation/transfer,Patient to call before getting OOB,PT Consult for mobility concerns         Medication Interventions: Patient to call before getting OOB,Teach patient to arise slowly    Elimination Interventions: Call light in reach,Patient to call for help with toileting needs,Toileting schedule/hourly rounds              Problem: Patient Education: Go to Patient Education Activity  Goal: Patient/Family Education  Outcome: Progressing Towards Goal     Problem: Pain  Goal: *Control of Pain  Outcome: Progressing Towards Goal  Goal: *PALLIATIVE CARE:  Alleviation of Pain  Outcome: Progressing Towards Goal     Problem: Patient Education: Go to Patient Education Activity  Goal: Patient/Family Education  Outcome: Progressing Towards Goal  Problem: Pressure Injury - Risk of  Goal: *Prevention of pressure injury  Description: Document Broderick Scale and appropriate interventions in the flowsheet.   Outcome: Progressing Towards Goal  Note: Pressure Injury Interventions:  Sensory Interventions: Assess changes in LOC,Avoid rigorous massage over bony prominences,Keep linens dry and wrinkle-free,Minimize linen layers,Monitor skin under medical devices    Activity Interventions: Assess need for specialty bed,Increase time out of bed,Pressure redistribution bed/mattress(bed type)    Mobility Interventions: HOB 30 degrees or less,Pressure redistribution bed/mattress (bed type),PT/OT evaluation    Nutrition Interventions: Document food/fluid/supplement intake  Problem: Patient Education: Go to Patient Education Activity  Goal: Patient/Family Education  Outcome: Progressing Towards Goal     Problem: Discharge Planning  Goal: *Discharge to safe environment  Outcome: Progressing Towards Goal

## 2022-01-21 NOTE — ROUTINE PROCESS
Bedside shift change report given to [address] (oncoming nurse) by Flor Álvarez RN (offgoing nurse). Report included the following information SBAR, Kardex, MAR, Recent Results, Alarm Parameters  and Quality Measures.

## 2022-01-21 NOTE — PROGRESS NOTES
Problem: Mobility Impaired (Adult and Pediatric)  Goal: *Acute Goals and Plan of Care (Insert Text)  Description: FUNCTIONAL STATUS PRIOR TO ADMISSION: Patient was independent and active without use of DME. She has history of breast cancer with mets to the spine. HOME SUPPORT PRIOR TO ADMISSION: Pt lives with her 9year old son. Her mother is also involved in her care. Physical Therapy Goals  Initiated 1/20/2022    1. Patient will move from supine to sit and sit to supine , scoot up and down, and roll side to side in bed with moderate assistance  within 4 days. 2. Patient will perform sit to stand with maximal assistance within 4 days. 3. Patient will ambulate with maximal assistance for 5 feet with the least restrictive device within 4 days. 4. Patient will verbalize and demonstrate understanding of spinal precautions (No bending, lifting greater than 5 lbs, or twisting; log-roll technique; frequent repositioning as instructed) within 4 days. Outcome: Progressing Towards Goal  PHYSICAL THERAPY TREATMENT  Patient: Kelli Brown (89 y.o. female)  Date: 1/21/2022  Diagnosis: Cord compression syndrome (HCC) [G95.20] <principal problem not specified>  Procedure(s) (LRB):  T11 - L2 LAMINECTOMY AND FUSION WITH O-ARM (N/A) 2 Days Post-Op  Precautions: Fall,Back  Chart, physical therapy assessment, plan of care and goals were reviewed. ASSESSMENT  Patient continues with skilled PT services and is progressing slowly towards goals. Pt remains limited by pain, BLE weakness, poor activity tolerance, impaired balance, and overall decline in functional mobility. Pt able to recall 3/3 back precautions and was able to verbalize steps for log roll technique. Transferred supine>sit with maxA x2 and demonstrated good static sitting balance unsupported. Attempted sit<>stand transfer with maxA x2 but only able to achieve squat. Pt transferred bed>chair with multiple scoots (drop arm chair) with maxA x2.  Ended session with all needs met and nursing updated. Received verbal order from PA that pt can be out of bed in chair without brace. Recommending IPR upon discharge. Current Level of Function Impacting Discharge (mobility/balance): maxA x2 for transfers     Other factors to consider for discharge: fall risk, PMH (breast cancer with mets to spine), pain management          PLAN :  Patient continues to benefit from skilled intervention to address the above impairments. Continue treatment per established plan of care. to address goals. Recommendation for discharge: (in order for the patient to meet his/her long term goals)  Therapy 3 hours per day 5-7 days per week  If d/c home, will be at bed level and will require assistance with all mobility and ADLs    This discharge recommendation:  Has been made in collaboration with the attending provider and/or case management    IF patient discharges home will need the following DME: hospital bed       SUBJECTIVE:   Patient stated It feels good to be out of bed in the chair.     OBJECTIVE DATA SUMMARY:   Critical Behavior:  Neurologic State: Alert,Eyes open spontaneously  Orientation Level: Oriented X4  Cognition: Appropriate decision making,Follows commands     Functional Mobility Training:  Bed Mobility:  Supine to Sit: Maximum assistance;Assist x2  Scooting: Maximum assistance;Assist x2    Balance:  Sitting: Impaired  Sitting - Static: Good (unsupported)  Sitting - Dynamic: Fair (occasional)  Standing: Impaired; With support  Standing - Static: Poor;Constant support  Standing - Dynamic : Poor;Constant support    Activity Tolerance:   Fair    After treatment patient left in no apparent distress:   Sitting in chair and Call bell within reach    COMMUNICATION/COLLABORATION:   The patients plan of care was discussed with: Occupational therapist and Registered nurse.      Connor Cheney PT, DPT   Time Calculation: 24 mins

## 2022-01-21 NOTE — PROGRESS NOTES
Hospitalist Progress Note      Hospital summary: 33 y.o lady w/ metastatic breast cancer who presented as a transfer from [facility] for management of cord compression from spine mets. Assessment/Plan:  Cord compression in the setting of metastatic breast cancer:  -s/p T11-L3 laminectomy on 1/19  -continue dexamethasone  -PT/OT as able  -neurosurgery and oncology consulted  -f/u pathology    Bilateral leg pain: some of this seems to be neuropathic  -continue fentanyl patch, PRN IV/PO Dilaudid (increase to Q3H PRN)  -started gabapentin 1/20  -bowel regimen    Code status: full  DVT prophylaxis: SCDs  Disposition: rehab when medically appropriate  ----------------------------------------------    CC: bilateral leg pain    S: pain improved compared to yesterday, no n/v/d, still no bm    Review of Systems:  Pertinent items are noted in HPI.     O:  Visit Vitals  /86   Pulse 98   Temp 98.6 °F (37 °C)   Resp 18   Ht 5' 2\" (1.575 m)   Wt 95 kg (209 lb 7 oz)   SpO2 97%   BMI 38.31 kg/m²       PHYSICAL EXAM:  Gen: NAD, non-toxic  HEENT: anicteric sclerae, normal conjunctiva  Neck: supple, trachea midline, no adenopathy  Heart: RRR, no MRG, no JVD, no peripheral edema  Lungs: CTA b/l, non-labored respirations  Abd: soft, NT, ND, BS+, no organomegaly  Extr: warm  Skin: dry, no rash  Neuro: CN II-XII grossly intact, normal speech, limited   Psych: normal mood, appropriate affect      Intake/Output Summary (Last 24 hours) at 1/21/2022 1539  Last data filed at 1/21/2022 0748  Gross per 24 hour   Intake --   Output 1790 ml   Net -1790 ml        Recent labs & imaging reviewed:  Recent Results (from the past 24 hour(s))   CBC W/O DIFF    Collection Time: 01/21/22  1:17 AM   Result Value Ref Range    WBC 10.0 3.6 - 11.0 K/uL    RBC 3.19 (L) 3.80 - 5.20 M/uL    HGB 8.3 (L) 11.5 - 16.0 g/dL    HCT 26.6 (L) 35.0 - 47.0 %    MCV 83.4 80.0 - 99.0 FL    MCH 26.0 26.0 - 34.0 PG    MCHC 31.2 30.0 - 36.5 g/dL    RDW 13.6 11.5 - 14.5 %    PLATELET 729 953 - 393 K/uL    MPV 10.0 8.9 - 12.9 FL    NRBC 0.2 (H) 0  WBC    ABSOLUTE NRBC 0.02 (H) 0.00 - 0.08 K/uL   METABOLIC PANEL, BASIC    Collection Time: 01/21/22  1:17 AM   Result Value Ref Range    Sodium 134 (L) 136 - 145 mmol/L    Potassium 4.2 3.5 - 5.1 mmol/L    Chloride 103 97 - 108 mmol/L    CO2 29 21 - 32 mmol/L    Anion gap 2 (L) 5 - 15 mmol/L    Glucose 107 (H) 65 - 100 mg/dL    BUN 12 6 - 20 MG/DL    Creatinine 0.52 (L) 0.55 - 1.02 MG/DL    BUN/Creatinine ratio 23 (H) 12 - 20      GFR est AA >60 >60 ml/min/1.73m2    GFR est non-AA >60 >60 ml/min/1.73m2    Calcium 9.0 8.5 - 10.1 MG/DL   MAGNESIUM    Collection Time: 01/21/22  1:17 AM   Result Value Ref Range    Magnesium 2.1 1.6 - 2.4 mg/dL     Recent Labs     01/21/22  0117 01/20/22  0305 01/19/22 0714 01/19/22 0714   WBC 10.0  --   --  8.9   HGB 8.3* 8.6*   < > 9.4*   HCT 26.6*  --   --  29.7*     --   --  312    < > = values in this interval not displayed. Recent Labs     01/21/22  0117 01/19/22 0714   * 133*   K 4.2 4.5    100   CO2 29 26   BUN 12 14   CREA 0.52* 0.41*   * 95   CA 9.0 10.5*   MG 2.1  --      Recent Labs     01/19/22 0714   ALT 45      TBILI 0.3   TP 7.6   ALB 3.1*   GLOB 4.5*     No results for input(s): INR, PTP, APTT, INREXT, INREXT in the last 72 hours. No results for input(s): FE, TIBC, PSAT, FERR in the last 72 hours. No results found for: FOL, RBCF   No results for input(s): PH, PCO2, PO2 in the last 72 hours. No results for input(s): CPK, CKNDX, TROIQ in the last 72 hours.     No lab exists for component: CPKMB  No results found for: CHOL, CHOLX, CHLST, CHOLV, HDL, HDLP, LDL, LDLC, DLDLP, TGLX, TRIGL, TRIGP, CHHD, CHHDX  No results found for: GLUCPOC  No results found for: COLOR, APPRN, SPGRU, REFSG, ROMERO, PROTU, GLUCU, KETU, BILU, UROU, SANAZ, LEUKU, GLUKE, EPSU, BACTU, WBCU, RBCU, CASTS, UCRY    Med list reviewed  Current Facility-Administered Medications   Medication Dose Route Frequency    dexAMETHasone (DECADRON) tablet 2 mg  2 mg Oral Q12H    [START ON 1/22/2022] dexAMETHasone (DECADRON) tablet 1 mg  1 mg Oral Q12H    [START ON 1/24/2022] dexAMETHasone (DECADRON) tablet 1 mg  1 mg Oral DAILY    gabapentin (NEURONTIN) capsule 300 mg  300 mg Oral TID    cyclobenzaprine (FLEXERIL) tablet 5 mg  5 mg Oral BID    HYDROmorphone (PF) (DILAUDID) injection 2 mg  2 mg IntraVENous Q3H PRN    senna-docusate (PERICOLACE) 8.6-50 mg per tablet 1 Tablet  1 Tablet Oral DAILY    HYDROmorphone (DILAUDID) tablet 1 mg  1 mg Oral Q4H PRN    HYDROmorphone (DILAUDID) tablet 2 mg  2 mg Oral Q4H PRN    polyethylene glycol (MIRALAX) packet 17 g  17 g Oral DAILY    sodium chloride (NS) flush 5-40 mL  5-40 mL IntraVENous Q8H    sodium chloride (NS) flush 5-40 mL  5-40 mL IntraVENous PRN    acetaminophen (TYLENOL) tablet 650 mg  650 mg Oral Q6H PRN    Or    acetaminophen (TYLENOL) suppository 650 mg  650 mg Rectal Q6H PRN    ondansetron (ZOFRAN ODT) tablet 4 mg  4 mg Oral Q8H PRN    Or    ondansetron (ZOFRAN) injection 4 mg  4 mg IntraVENous Q6H PRN    metoprolol tartrate (LOPRESSOR) tablet 25 mg  25 mg Oral Q12H    fentaNYL (DURAGESIC) 50 mcg/hr patch 1 Patch  1 Patch TransDERmal Q72H    0.9% sodium chloride infusion 250 mL  250 mL IntraVENous PRN    sodium chloride (NS) flush 5-40 mL  5-40 mL IntraVENous Q8H    sodium chloride (NS) flush 5-40 mL  5-40 mL IntraVENous PRN    acetaminophen (TYLENOL) tablet 650 mg  650 mg Oral Q6H    naloxone (NARCAN) injection 0.4 mg  0.4 mg IntraVENous PRN    diphenhydrAMINE (BENADRYL) capsule 25 mg  25 mg Oral Q6H PRN    phenol throat spray (CHLORASEPTIC) 1 Spray  1 Spray Oral PRN    benzocaine-menthoL (CEPACOL) lozenge 1 Lozenge  1 Lozenge Oral PRN       Care Plan discussed with:  Patient/Family and Nurse    Charly Dias MD  Internal Medicine  Date of Service: 1/21/2022

## 2022-01-21 NOTE — PROGRESS NOTES
Problem: Self Care Deficits Care Plan (Adult)  Goal: *Acute Goals and Plan of Care (Insert Text)  Description: FUNCTIONAL STATUS PRIOR TO ADMISSION: At baseline, patient was independent with ADLs, IADLs, and functional mobility/ ambulatory without AD. Was ambulatory with a RW for ~1 month PTA, then was unable to walk for a few days prior to admission. Endorses no falls. HOME SUPPORT: Patient resided with her 8 y/o son and did not require assistance at baseline. Is anticipating staying at her mother's house when she returns home. Occupational Therapy Goals  Initiated 1/20/2022  1. Patient will perform upper body dressing with supervision/set-up within 7 day(s). 2.  Patient will perform lower body dressing using AE with moderate assistance  within 7 day(s). 3.  Patient will perform sliding board transfer to [facility] with moderate assistance  within 7 day(s). 4.  Patient will perform all aspects of toileting with moderate assistance  within 7 day(s). 5.  Patient will participate in upper extremity therapeutic exercise/activities with supervision/set-up for 10 minutes within 7 day(s). 6.  Patient will don/doff back brace at supervision/set-up within 7 days. 7.  Patient will verbalize/demonstrate 3/3 back precautions during ADL tasks without cues within 7 days. Outcome: Progressing Towards Goal     OCCUPATIONAL THERAPY TREATMENT  Patient: Aron Youssef (78 y.o. female)  Date: 1/21/2022  Diagnosis: Cord compression syndrome ([facility] [address] [G95.20] <principal problem not specified>  Procedure(s) (LRB):  T11 - L2 LAMINECTOMY AND FUSION WITH O-ARM (N/A) 2 Days Post-Op  Precautions: Fall,Back  Chart, occupational therapy assessment, plan of care, and goals were reviewed. ASSESSMENT  Patient continues with skilled OT services and is progressing towards goals.   Patient remains limited by severe BLE weakness (improving), generally impaired BUE AROM/ strength/ coordination (d/t pain, improving), impaired functional reach for UB < LB ADLs, and impaired sitting balance. Note mobility progress in PT today. Patient seen for OT in collaboration with orthotist for TLSO casting. Patient with significantly improved sitting tolerance (~15 min). Casting provided opportunity for UB bathing, which patient completed with minimal assistance. Yesterday patient required constant BUE stabilization at EOB and today patient was able to use BUE simultaneously for tasks while maintaining balance. Was able to scoot laterally along EOB with maximum assistance x2. Patient is significantly below functional baseline, participates well in therapy sessions, and is motivated to progress. She is an excellent candidate for inpatient rehab at /. Current Level of Function Impacting Discharge (ADLs/self-care): Minimum assistance for UB bathing, maximum assistance x2 for scooting along EOB, and maximum assistance x2 for sit-supine. Infer overall patient requires set-up to moderate assistance UB ADLs and total assistance LB ADLs     PLAN :  Patient continues to benefit from skilled intervention to address the above impairments. Continue treatment per established plan of care to address goals. Recommendation for discharge: (in order for the patient to meet his/her long term goals)  Therapy 3 hours per day 5-7 days per week    This discharge recommendation:  Has been made in collaboration with the attending provider and/or case management         SUBJECTIVE:   Patient stated It's a little better today.  (pain management)    OBJECTIVE DATA SUMMARY:   Cognitive/Behavioral Status:  Neurologic State: Alert;Eyes open spontaneously  Orientation Level: Oriented X4  Cognition: Appropriate decision making; Follows commands             Functional Mobility and Transfers for ADLs:  Bed Mobility:  Supine to Sit: Maximum assistance;Assist x2  Sit to Supine: Maximum assistance;Assist x2  Scooting: Maximum assistance;Assist x2    Transfers:  Sit to Stand: Maximum assistance;Assist x2     Bed to Chair: Maximum assistance;Assist x2    Balance:  Sitting: Impaired  Sitting - Static: Good (unsupported)  Sitting - Dynamic: Fair (occasional)  Standing: Impaired; With support  Standing - Static: Poor;Constant support  Standing - Dynamic : Poor;Constant support    ADL Intervention:        UB bathing: minimum assistance, sitting EOB       Pain:  Patient reported surgical back pain, improved from yesterday    Activity Tolerance:   Good    After treatment patient left in no apparent distress:   Supine in bed, Call bell within reach, and Caregiver / family present    COMMUNICATION/COLLABORATION:   The patients plan of care was discussed with: Physical therapist and Registered nurse.      Kulwant Lyon OT  Time Calculation: 27 mins

## 2022-01-21 NOTE — PROGRESS NOTES
Transition of Care Plan: [id number], auth started.      RUR: 6%     PCP F/U: Dr. Renetta Bustos     Disposition: [facility] accepted     Transportation: BLS     Main Contact: mother: Jm Hagen [phone]     1036: Received noticed from [facility] that they are reviewing case. 1533: [facility] accepted and will start auth.  However, unable to accept if patient will need to be on Chemo during IPR stay.      Eloy Layne RN, CRM

## 2022-01-21 NOTE — PROGRESS NOTES
Spine Surgery Progress Note  Emily Valenzuela PA-C    Admit Date: 2022   LOS: 2 days      Daily Progress Note: 2022    POD:2 Day Post-Op    S/P: Procedure(s):  T11 - L2 LAMINECTOMY AND FUSION WITH O-ARM    Subjective:     Ms. Chirag Mendez is a very pleasant 70-year-old female with a known history of metastatic breast cancer. PET scan in the end of December showed disease in the spine and liver. Pt has had two visits to the emergency room on  and  for low back pain and urinary retention. For the last two days, she has had inability to walk and an inability to move her legs. Sensation is intact however, she only has minimal movement of her toes, no movement in her legs. Patient was transferred here from [facility] to [facility] for MRI. Imagine revealed \"Extensive bony metastatic disease with diffuse areas of epidural spread of  tumor. Most significant from T10 through L2 with severe cord compression at L1 and superior aspect of L2 and associated pathologic fracture of L1. Soft tissue extension of tumor into the right paraspinous muscles at L1 with patchy bilateral muscular enhancement. \"  Patient underwent T11-L3 laminectomy and fusion surgery with resection of metastatic tumor on 22. She tolerated the procedure well. Patient is resting comfortably in bed. Pain is slightly improved from yesterday. She states pain is only severe after PT. Strength unchanged from yesterday. She denies headache, chest pain, nausea, vomiting, and dyspnea.     Objective:     Vital signs  Temp (24hrs), Av.3 °F (36.8 °C), Min:98 °F (36.7 °C), Max:99 °F (37.2 °C)    07 -  190  In: -   Out: 130 [Drains:130]  1901 -  0700  In: 120 [P.O.:120]  Out: 2910 [Urine:2650; Drains:260]    Visit Vitals  /89   Pulse (!) 108   Temp 98.1 °F (36.7 °C)   Resp 14   Ht 5' 2\" (1.575 m)   Wt 95 kg (209 lb 7 oz)   SpO2 100%   BMI 38.31 kg/m²      O2 Device: None (Room air)       Voiding status: aguilera  Output (mL)  Last Bowel Movement Date: 01/16/22 (01/20/22 0800)     Pain control  Pain Assessment  Pain Scale 1: Numeric (0 - 10)  Pain Intensity 1: 2  Pain Onset 1: post op  Pain Location 1: Back  Pain Orientation 1: Mid,Lower  Pain Description 1: Aching  Pain Intervention(s) 1: Rest,Repositioned    PT/OT  Gait              Physical Exam:  Gen: No acute distress. Neuro: A&Ox3. Follows commands. Speech clear. Affect normal.  PERRL. EOMI. Face symmetric. Strength 5/5 in BUE. BLE hip flexors 0/5, knee extension is 1/5, ankle dorsi/plantar flexion 2/5. Wiggles toes bilaterally  Sensation intact. Gait deferred. Calves soft and supple;  No pain with passive stretch  Skin: Incision C/D/I  Drain intact: output 130mL overnight    24 hour results:    Recent Results (from the past 24 hour(s))   CBC W/O DIFF    Collection Time: 01/21/22  1:17 AM   Result Value Ref Range    WBC 10.0 3.6 - 11.0 K/uL    RBC 3.19 (L) 3.80 - 5.20 M/uL    HGB 8.3 (L) 11.5 - 16.0 g/dL    HCT 26.6 (L) 35.0 - 47.0 %    MCV 83.4 80.0 - 99.0 FL    MCH 26.0 26.0 - 34.0 PG    MCHC 31.2 30.0 - 36.5 g/dL    RDW 13.6 11.5 - 14.5 %    PLATELET 329 138 - 821 K/uL    MPV 10.0 8.9 - 12.9 FL    NRBC 0.2 (H) 0  WBC    ABSOLUTE NRBC 0.02 (H) 0.00 - 1.05 K/uL   METABOLIC PANEL, BASIC    Collection Time: 01/21/22  1:17 AM   Result Value Ref Range    Sodium 134 (L) 136 - 145 mmol/L    Potassium 4.2 3.5 - 5.1 mmol/L    Chloride 103 97 - 108 mmol/L    CO2 29 21 - 32 mmol/L    Anion gap 2 (L) 5 - 15 mmol/L    Glucose 107 (H) 65 - 100 mg/dL    BUN 12 6 - 20 MG/DL    Creatinine 0.52 (L) 0.55 - 1.02 MG/DL    BUN/Creatinine ratio 23 (H) 12 - 20      GFR est AA >60 >60 ml/min/1.73m2    GFR est non-AA >60 >60 ml/min/1.73m2    Calcium 9.0 8.5 - 10.1 MG/DL   MAGNESIUM    Collection Time: 01/21/22  1:17 AM   Result Value Ref Range    Magnesium 2.1 1.6 - 2.4 mg/dL        Assessment:     Active Problems:    [facility] (1/19/2022)      Plan: 1) s/p T11-L3 laminectomy/fusion w 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 Faiza Best is primary oncologist   -Per oncology, further systemic tx will be dependent on tissue pathology   -Path pending   -Will need radiation 4-6 weeks after surgery  3) Hx of SVT   -Cont metoprolol    Plan d/w Dr. Rodrigo Fox PA

## 2022-01-21 NOTE — PROGRESS NOTES
Problem: Falls - Risk of  Goal: *Absence of Falls  Description: Document Marta Barakat Fall Risk and appropriate interventions in the flowsheet. Outcome: Progressing Towards Goal  Note: Fall Risk Interventions:  Mobility Interventions: Bed/chair exit alarm,Patient to call before getting OOB         Medication Interventions: Bed/chair exit alarm,Patient to call before getting OOB    Elimination Interventions: Bed/chair exit alarm,Call light in reach,Patient to call for help with toileting needs              Problem: Patient Education: Go to Patient Education Activity  Goal: Patient/Family Education  Outcome: Progressing Towards Goal     Problem: Pain  Goal: *Control of Pain  Outcome: Progressing Towards Goal  Goal: *PALLIATIVE CARE:  Alleviation of Pain  Outcome: Progressing Towards Goal     Problem: Patient Education: Go to Patient Education Activity  Goal: Patient/Family Education  Outcome: Progressing Towards Goal     Problem: Pressure Injury - Risk of  Goal: *Prevention of pressure injury  Description: Document Broderick Scale and appropriate interventions in the flowsheet.   Outcome: Progressing Towards Goal  Note: Pressure Injury Interventions:  Sensory Interventions: Assess changes in LOC,Discuss PT/OT consult with provider         Activity Interventions: Assess need for specialty bed,PT/OT evaluation,Pressure redistribution bed/mattress(bed type)    Mobility Interventions: PT/OT evaluation,Pressure redistribution bed/mattress (bed type),HOB 30 degrees or less    Nutrition Interventions: Document food/fluid/supplement intake    Friction and Shear Interventions: HOB 30 degrees or less                Problem: Patient Education: Go to Patient Education Activity  Goal: Patient/Family Education  Outcome: Progressing Towards Goal     Problem: Patient Education: Go to Patient Education Activity  Goal: Patient/Family Education  Outcome: Progressing Towards Goal     Problem: Discharge Planning  Goal: *Discharge to safe environment  Outcome: Progressing Towards Goal  Goal: *Knowledge of medication management  Outcome: Progressing Towards Goal  Goal: *Knowledge of discharge instructions  Outcome: Progressing Towards Goal     Problem: Patient Education: Go to Patient Education Activity  Goal: Patient/Family Education  Outcome: Progressing Towards Goal     Problem: Patient Education: Go to Patient Education Activity  Goal: Patient/Family Education  Outcome: Progressing Towards Goal

## 2022-01-22 ENCOUNTER — APPOINTMENT (OUTPATIENT)
Dept: MRI IMAGING | Age: 32
DRG: 029 | End: 2022-01-22
Attending: PHYSICIAN ASSISTANT
Payer: COMMERCIAL

## 2022-01-22 PROCEDURE — 70553 MRI BRAIN STEM W/O & W/DYE: CPT

## 2022-01-22 PROCEDURE — 74011250636 HC RX REV CODE- 250/636

## 2022-01-22 PROCEDURE — 65660000000 HC RM CCU STEPDOWN

## 2022-01-22 PROCEDURE — 74011250637 HC RX REV CODE- 250/637: Performed by: SPECIALIST

## 2022-01-22 PROCEDURE — 94760 N-INVAS EAR/PLS OXIMETRY 1: CPT

## 2022-01-22 PROCEDURE — 74011250636 HC RX REV CODE- 250/636: Performed by: PHYSICIAN ASSISTANT

## 2022-01-22 PROCEDURE — 74011250636 HC RX REV CODE- 250/636: Performed by: HOSPITALIST

## 2022-01-22 PROCEDURE — 97530 THERAPEUTIC ACTIVITIES: CPT

## 2022-01-22 PROCEDURE — 74011000250 HC RX REV CODE- 250: Performed by: SPECIALIST

## 2022-01-22 PROCEDURE — 74011250637 HC RX REV CODE- 250/637: Performed by: STUDENT IN AN ORGANIZED HEALTH CARE EDUCATION/TRAINING PROGRAM

## 2022-01-22 PROCEDURE — 74011250637 HC RX REV CODE- 250/637: Performed by: HOSPITALIST

## 2022-01-22 PROCEDURE — 97535 SELF CARE MNGMENT TRAINING: CPT

## 2022-01-22 PROCEDURE — 74011250636 HC RX REV CODE- 250/636: Performed by: NEUROLOGICAL SURGERY

## 2022-01-22 PROCEDURE — A9576 INJ PROHANCE MULTIPACK: HCPCS

## 2022-01-22 PROCEDURE — 74011000250 HC RX REV CODE- 250: Performed by: STUDENT IN AN ORGANIZED HEALTH CARE EDUCATION/TRAINING PROGRAM

## 2022-01-22 PROCEDURE — 74011250637 HC RX REV CODE- 250/637: Performed by: PHYSICIAN ASSISTANT

## 2022-01-22 RX ORDER — BACITRACIN 500 UNIT/G
PACKET (EA) TOPICAL
Status: DISPENSED
Start: 2022-01-22 | End: 2022-01-22

## 2022-01-22 RX ORDER — ENOXAPARIN SODIUM 100 MG/ML
40 INJECTION SUBCUTANEOUS EVERY 24 HOURS
Status: DISCONTINUED | OUTPATIENT
Start: 2022-01-22 | End: 2022-02-01 | Stop reason: HOSPADM

## 2022-01-22 RX ORDER — BACITRACIN 500 UNIT/G
1 PACKET (EA) TOPICAL
Status: ACTIVE | OUTPATIENT
Start: 2022-01-22 | End: 2022-01-22

## 2022-01-22 RX ADMIN — METOPROLOL TARTRATE 25 MG: 25 TABLET, FILM COATED ORAL at 08:16

## 2022-01-22 RX ADMIN — HYDROMORPHONE HYDROCHLORIDE 2 MG: 2 INJECTION INTRAMUSCULAR; INTRAVENOUS; SUBCUTANEOUS at 08:14

## 2022-01-22 RX ADMIN — CYCLOBENZAPRINE 5 MG: 10 TABLET, FILM COATED ORAL at 08:16

## 2022-01-22 RX ADMIN — HYDROMORPHONE HYDROCHLORIDE 2 MG: 2 TABLET ORAL at 04:41

## 2022-01-22 RX ADMIN — HYDROMORPHONE HYDROCHLORIDE 1 MG: 2 TABLET ORAL at 21:49

## 2022-01-22 RX ADMIN — DEXAMETHASONE 1 MG: 1 TABLET ORAL at 21:47

## 2022-01-22 RX ADMIN — GABAPENTIN 300 MG: 300 CAPSULE ORAL at 08:15

## 2022-01-22 RX ADMIN — SODIUM CHLORIDE, PRESERVATIVE FREE 10 ML: 5 INJECTION INTRAVENOUS at 06:37

## 2022-01-22 RX ADMIN — HYDROMORPHONE HYDROCHLORIDE 2 MG: 2 INJECTION INTRAMUSCULAR; INTRAVENOUS; SUBCUTANEOUS at 21:49

## 2022-01-22 RX ADMIN — POLYETHYLENE GLYCOL 3350 17 G: 17 POWDER, FOR SOLUTION ORAL at 08:47

## 2022-01-22 RX ADMIN — ACETAMINOPHEN 650 MG: 325 TABLET ORAL at 13:59

## 2022-01-22 RX ADMIN — SODIUM CHLORIDE, PRESERVATIVE FREE 10 ML: 5 INJECTION INTRAVENOUS at 21:51

## 2022-01-22 RX ADMIN — SODIUM CHLORIDE, PRESERVATIVE FREE 10 ML: 5 INJECTION INTRAVENOUS at 14:02

## 2022-01-22 RX ADMIN — ENOXAPARIN SODIUM 40 MG: 100 INJECTION SUBCUTANEOUS at 14:00

## 2022-01-22 RX ADMIN — DEXAMETHASONE 2 MG: 1 TABLET ORAL at 08:15

## 2022-01-22 RX ADMIN — ACETAMINOPHEN 650 MG: 325 TABLET ORAL at 21:47

## 2022-01-22 RX ADMIN — METOPROLOL TARTRATE 25 MG: 25 TABLET, FILM COATED ORAL at 21:47

## 2022-01-22 RX ADMIN — GABAPENTIN 300 MG: 300 CAPSULE ORAL at 21:47

## 2022-01-22 RX ADMIN — ACETAMINOPHEN 650 MG: 325 TABLET ORAL at 06:36

## 2022-01-22 RX ADMIN — CYCLOBENZAPRINE 5 MG: 10 TABLET, FILM COATED ORAL at 17:02

## 2022-01-22 RX ADMIN — GADOTERIDOL 20 ML: 279.3 INJECTION, SOLUTION INTRAVENOUS at 13:10

## 2022-01-22 RX ADMIN — SENNOSIDES AND DOCUSATE SODIUM 1 TABLET: 50; 8.6 TABLET ORAL at 08:15

## 2022-01-22 RX ADMIN — SODIUM CHLORIDE, PRESERVATIVE FREE 10 ML: 5 INJECTION INTRAVENOUS at 14:01

## 2022-01-22 RX ADMIN — GABAPENTIN 300 MG: 300 CAPSULE ORAL at 17:02

## 2022-01-22 RX ADMIN — ACETAMINOPHEN 650 MG: 325 TABLET ORAL at 17:03

## 2022-01-22 RX ADMIN — HYDROMORPHONE HYDROCHLORIDE 2 MG: 2 INJECTION INTRAMUSCULAR; INTRAVENOUS; SUBCUTANEOUS at 15:28

## 2022-01-22 NOTE — PROGRESS NOTES
Problem: Falls - Risk of  Goal: *Absence of Falls  Description: Document Kala Nap Fall Risk and appropriate interventions in the flowsheet. Outcome: Progressing Towards Goal  Note: Fall Risk Interventions:  Mobility Interventions: Bed/chair exit alarm,Patient to call before getting OOB         Medication Interventions: Bed/chair exit alarm,Patient to call before getting OOB    Elimination Interventions: Bed/chair exit alarm,Call light in reach,Patient to call for help with toileting needs              Problem: Patient Education: Go to Patient Education Activity  Goal: Patient/Family Education  Outcome: Progressing Towards Goal     Problem: Pain  Goal: *Control of Pain  Outcome: Progressing Towards Goal  Goal: *PALLIATIVE CARE:  Alleviation of Pain  Outcome: Progressing Towards Goal     Problem: Patient Education: Go to Patient Education Activity  Goal: Patient/Family Education  Outcome: Progressing Towards Goal     Problem: Pressure Injury - Risk of  Goal: *Prevention of pressure injury  Description: Document Broderick Scale and appropriate interventions in the flowsheet.   Outcome: Progressing Towards Goal  Note: Pressure Injury Interventions:  Sensory Interventions: Assess changes in LOC,Discuss PT/OT consult with provider         Activity Interventions: PT/OT evaluation,Pressure redistribution bed/mattress(bed type)    Mobility Interventions: HOB 30 degrees or less,PT/OT evaluation,Pressure redistribution bed/mattress (bed type)    Nutrition Interventions: Document food/fluid/supplement intake,Discuss nutritional consult with provider    Friction and Shear Interventions: HOB 30 degrees or less                Problem: Patient Education: Go to Patient Education Activity  Goal: Patient/Family Education  Outcome: Progressing Towards Goal     Problem: Patient Education: Go to Patient Education Activity  Goal: Patient/Family Education  Outcome: Progressing Towards Goal     Problem: Discharge Planning  Goal: *Discharge to safe environment  Outcome: Progressing Towards Goal  Goal: *Knowledge of medication management  Outcome: Progressing Towards Goal  Goal: *Knowledge of discharge instructions  Outcome: Progressing Towards Goal     Problem: Patient Education: Go to Patient Education Activity  Goal: Patient/Family Education  Outcome: Progressing Towards Goal     Problem: Patient Education: Go to Patient Education Activity  Goal: Patient/Family Education  Outcome: Progressing Towards Goal

## 2022-01-22 NOTE — PROGRESS NOTES
Hospitalist Progress Note      Hospital summary: 33 y.o lady w/ metastatic breast cancer who presented as a transfer from [facility] for management of cord compression from spine mets. Assessment/Plan:  Cord compression in the setting of metastatic breast cancer:  -s/p T11-L3 laminectomy on 1/19  -continue dexamethasone  -PT/OT as able  -neurosurgery and oncology consulted  -f/u pathology    Bilateral leg pain: some of this seems to be neuropathic  -continue fentanyl patch, PRN Dilaudid  -increase gabapentin to 300 mg TID  -bowel regimen    Code status: full  DVT prophylaxis: now on Lovenox  Disposition: rehab when medically appropriate  ----------------------------------------------    CC: bilateral leg pain    S: pain improved compared to yesterday, no n/v/d, still no bm    Review of Systems:  Pertinent items are noted in HPI. O:  Visit Vitals  /84   Pulse (!) 109   Temp 99 °F (37.2 °C)   Resp 20   Ht 5' 2\" (1.575 m)   Wt 93.4 kg (205 lb 14.6 oz)   SpO2 98%   BMI 37.66 kg/m²       PHYSICAL EXAM:  Gen: NAD, non-toxic, obese  HEENT: anicteric sclerae, normal conjunctiva  Neck: supple, trachea midline, no adenopathy  Heart: RRR, no MRG, no JVD, no peripheral edema  Lungs: CTA b/l, non-labored respirations  Abd: soft, NT, ND, BS+  Extr: warm  Skin: dry, no rash  Neuro: CN II-XII grossly intact, normal speech, minimal b/l LE movement 2/5  Psych: normal mood, appropriate affect      Intake/Output Summary (Last 24 hours) at 1/22/2022 1604  Last data filed at 1/22/2022 8607  Gross per 24 hour   Intake --   Output 550 ml   Net -550 ml        Recent labs & imaging reviewed:  No results found for this or any previous visit (from the past 24 hour(s)).   Recent Labs     01/21/22  0117 01/20/22  0305   WBC 10.0  --    HGB 8.3* 8.6*   HCT 26.6*  --      --      Recent Labs     01/21/22  0117   *   K 4.2      CO2 29   BUN 12   CREA 0.52*   *   CA 9.0   MG 2.1 No results for input(s): ALT, AP, TBIL, TBILI, TP, ALB, GLOB, GGT, AML, LPSE in the last 72 hours. No lab exists for component: SGOT, GPT, AMYP, HLPSE  No results for input(s): INR, PTP, APTT, INREXT, INREXT in the last 72 hours. No results for input(s): FE, TIBC, PSAT, FERR in the last 72 hours. No results found for: FOL, RBCF   No results for input(s): PH, PCO2, PO2 in the last 72 hours. No results for input(s): CPK, CKNDX, TROIQ in the last 72 hours.     No lab exists for component: CPKMB  No results found for: CHOL, CHOLX, CHLST, CHOLV, HDL, HDLP, LDL, LDLC, DLDLP, TGLX, TRIGL, TRIGP, CHHD, CHHDX  No results found for: GLUCPOC  No results found for: COLOR, APPRN, SPGRU, REFSG, ROMERO, PROTU, GLUCU, KETU, BILU, UROU, SANAZ, LEUKU, GLUKE, EPSU, BACTU, WBCU, RBCU, CASTS, UCRY    Med list reviewed  Current Facility-Administered Medications   Medication Dose Route Frequency    bacitracin 500 unit/gram packet 1 Packet  1 Packet Topical NOW    bacitracin 500 unit/gram packet        enoxaparin (LOVENOX) injection 40 mg  40 mg SubCUTAneous Q24H    dexAMETHasone (DECADRON) tablet 1 mg  1 mg Oral Q12H    [START ON 1/24/2022] dexAMETHasone (DECADRON) tablet 1 mg  1 mg Oral DAILY    gabapentin (NEURONTIN) capsule 300 mg  300 mg Oral TID    cyclobenzaprine (FLEXERIL) tablet 5 mg  5 mg Oral BID    bisacodyL (DULCOLAX) suppository 10 mg  10 mg Rectal DAILY PRN    HYDROmorphone (PF) (DILAUDID) injection 2 mg  2 mg IntraVENous Q3H PRN    senna-docusate (PERICOLACE) 8.6-50 mg per tablet 1 Tablet  1 Tablet Oral DAILY    HYDROmorphone (DILAUDID) tablet 1 mg  1 mg Oral Q4H PRN    HYDROmorphone (DILAUDID) tablet 2 mg  2 mg Oral Q4H PRN    polyethylene glycol (MIRALAX) packet 17 g  17 g Oral DAILY    sodium chloride (NS) flush 5-40 mL  5-40 mL IntraVENous Q8H    sodium chloride (NS) flush 5-40 mL  5-40 mL IntraVENous PRN    acetaminophen (TYLENOL) tablet 650 mg  650 mg Oral Q6H PRN    Or    acetaminophen (TYLENOL) suppository 650 mg  650 mg Rectal Q6H PRN    ondansetron (ZOFRAN ODT) tablet 4 mg  4 mg Oral Q8H PRN    Or    ondansetron (ZOFRAN) injection 4 mg  4 mg IntraVENous Q6H PRN    metoprolol tartrate (LOPRESSOR) tablet 25 mg  25 mg Oral Q12H    fentaNYL (DURAGESIC) 50 mcg/hr patch 1 Patch  1 Patch TransDERmal Q72H    0.9% sodium chloride infusion 250 mL  250 mL IntraVENous PRN    sodium chloride (NS) flush 5-40 mL  5-40 mL IntraVENous Q8H    sodium chloride (NS) flush 5-40 mL  5-40 mL IntraVENous PRN    acetaminophen (TYLENOL) tablet 650 mg  650 mg Oral Q6H    naloxone (NARCAN) injection 0.4 mg  0.4 mg IntraVENous PRN    diphenhydrAMINE (BENADRYL) capsule 25 mg  25 mg Oral Q6H PRN    phenol throat spray (CHLORASEPTIC) 1 Spray  1 Spray Oral PRN    benzocaine-menthoL (CEPACOL) lozenge 1 Lozenge  1 Lozenge Oral PRN       Care Plan discussed with:  Patient/Family and Nurse    Megan Diamond MD  Internal Medicine  Date of Service: 1/22/2022

## 2022-01-22 NOTE — PROGRESS NOTES
Bedside shift change report given to Mary Barrett RN (oncoming nurse) by Mckinley Gonzalez RN (offgoing nurse). Report included the following information SBAR, ED Summary, OR Summary, Procedure Summary, Intake/Output, Recent Results, Med Rec Status and Cardiac Rhythm SINUS TACH.

## 2022-01-22 NOTE — PROGRESS NOTES
Problem: Self Care Deficits Care Plan (Adult)  Goal: *Acute Goals and Plan of Care (Insert Text)  Description: FUNCTIONAL STATUS PRIOR TO ADMISSION: At baseline, patient was independent with ADLs, IADLs, and functional mobility/ ambulatory without AD. Was ambulatory with a RW for ~1 month PTA, then was unable to walk for a few days prior to admission. Endorses no falls. HOME SUPPORT: Patient resided with her 10 y/o son and did not require assistance at baseline. Is anticipating staying at her mother's house when she returns home. Occupational Therapy Goals  Initiated 1/20/2022  1. Patient will perform upper body dressing with supervision/set-up within 7 day(s). 2.  Patient will perform lower body dressing using AE with moderate assistance  within 7 day(s). 3.  Patient will perform sliding board transfer to [facility] with moderate assistance  within 7 day(s). 4.  Patient will perform all aspects of toileting with moderate assistance  within 7 day(s). 5.  Patient will participate in upper extremity therapeutic exercise/activities with supervision/set-up for 10 minutes within 7 day(s). 6.  Patient will don/doff back brace at supervision/set-up within 7 days. 7.  Patient will verbalize/demonstrate 3/3 back precautions during ADL tasks without cues within 7 days. Outcome: Progressing Towards Goal    OCCUPATIONAL THERAPY TREATMENT  Patient: Ita Blake (36 y.o. female)  Date: 1/22/2022  Diagnosis: Cord compression syndrome ([facility] [address] [G95.20] <principal problem not specified>  Procedure(s) (LRB):  T11 - L2 LAMINECTOMY AND FUSION WITH O-ARM (N/A) 3 Days Post-Op  Precautions: Fall,Back  Chart, occupational therapy assessment, plan of care, and goals were reviewed. ASSESSMENT  Patient continues with skilled OT services and is progressing towards goals.   Patient remains limited by severe BLE weakness (improving), generally impaired BUE AROM/ strength/ coordination (d/t pain, improving), impaired functional reach for UB < LB ADLs, and impaired sitting/ standing balance. Patient was instructed in donning/ doffing TLSO today and required total assistance. Progressed to achieve sit to stand with maximum assistance x2 using RW. Then attempted lateral scoot to drop arm chair, although unable this session d/t BLE fatigue and increased pain (RN notified). Initially maintained BUE at EOB for stabilization but was able to achieve simultaneous BUE overhead reaching with practice/ fading UE balance support. Patient is significantly below functional baseline, participates well in therapy sessions, and is motivated to progress. She is an excellent candidate for inpatient rehab at d/. Current Level of Function Impacting Discharge (ADLs/self-care): maximum assistance x2 for supine<>sit, maximum assistance x2 sit-stand. Infer overall patient requires set-up to moderate assistance UB ADLs and total assistance LB ADLs         PLAN :  Patient continues to benefit from skilled intervention to address the above impairments. Continue treatment per established plan of care to address goals. Recommendation for discharge: (in order for the patient to meet his/her long term goals)  Therapy 3 hours per day 5-7 days per week    This discharge recommendation:  Has been made in collaboration with the attending provider and/or case management         SUBJECTIVE:   Patient stated Jazmine Walker will try.     OBJECTIVE DATA SUMMARY:   Cognitive/Behavioral Status:  Neurologic State: Eyes open spontaneously  Orientation Level: Oriented X4  Cognition: Appropriate decision making             Functional Mobility and Transfers for ADLs:  Bed Mobility:  Rolling: Maximum assistance  Supine to Sit: Maximum assistance;Assist x2  Sit to Supine: Maximum assistance;Assist x2  Scooting: Maximum assistance;Assist x2    Transfers:  Sit to Stand: Maximum assistance;Assist x2 (to RW (maintained x 15 seconds), sit to stand without AD ) Balance:  Sitting: Impaired; With support  Sitting - Static: Good (unsupported)  Sitting - Dynamic: Fair (occasional)    ADL Intervention:            Upper Body Dressing Assistance  Orthotics(Brace): Total assistance (dependent) (TLSO)         Pain:  Patient reported 10/10 back pain. RN notified. Activity Tolerance:   Good    After treatment patient left in no apparent distress:   Supine in bed, Call bell within reach, and Caregiver / family present    COMMUNICATION/COLLABORATION:   The patients plan of care was discussed with: Physical therapist and Registered nurse.      Alyssa Stokes OT  Time Calculation: 39 mins

## 2022-01-22 NOTE — PROGRESS NOTES
Problem: Mobility Impaired (Adult and Pediatric)  Goal: *Acute Goals and Plan of Care (Insert Text)  Description: FUNCTIONAL STATUS PRIOR TO ADMISSION: Patient was independent and active without use of DME. She has history of breast cancer with mets to the spine. HOME SUPPORT PRIOR TO ADMISSION: Pt lives with her 9year old son. Her mother is also involved in her care. Physical Therapy Goals  Initiated 1/20/2022    1. Patient will move from supine to sit and sit to supine , scoot up and down, and roll side to side in bed with moderate assistance  within 4 days. 2. Patient will perform sit to stand with maximal assistance within 4 days. 3. Patient will ambulate with maximal assistance for 5 feet with the least restrictive device within 4 days. 4. Patient will verbalize and demonstrate understanding of spinal precautions (No bending, lifting greater than 5 lbs, or twisting; log-roll technique; frequent repositioning as instructed) within 4 days. Outcome: Progressing Towards Goal   PHYSICAL THERAPY TREATMENT  Patient: Rahat Muñoz (11 y.o. female)  Date: 1/22/2022  Diagnosis: Cord compression syndrome (H[facility]) [G95.20] <principal problem not specified>  Procedure(s) (LRB):  T11 - L2 LAMINECTOMY AND FUSION WITH O-ARM (N/A) 3 Days Post-Op  Precautions: Fall,Back No bending, no lifting greater than 5 lbs, no twisting, log-roll technique, repositioning every 20-30 min except when sleeping, brace when OOB (if ordered)  Chart, physical therapy assessment, plan of care and goals were reviewed. ASSESSMENT  Patient continues with skilled PT services and is progressing towards goals. Patient tearful upon first attempt of PT session today, however, with encouragement patient agreeable to attempt. Patient off the floor earlier today with MRI. Parent's present in room visiting. Patient able to tolerate sitting x 10 minutes and perform AROM flexion of shoulders bilaterally.   Patient able to perform sit to stand at [facility] with max A x 2 and maintain standing x 10-15 seconds before needing to sit. Attempt to scoot into bedside chair, however, despite max A x 2 patient unable to clear bottom to move towards chair. Patient appearing fatigued and reporting 10/10 pain. Patient assisted back to bed max A x2 and repositioned in supine. RN notified. Current Level of Function Impacting Discharge (mobility/balance): bed mobility max A x2, transfers max A x 2, unable to assess gait at this time    Other factors to consider for discharge:          PLAN :  Patient continues to benefit from skilled intervention to address the above impairments. Continue treatment per established plan of care. to address goals. Recommendation for discharge: (in order for the patient to meet his/her long term goals)  Therapy 3 hours per day 5-7 days per week    This discharge recommendation:  Has not yet been discussed the attending provider and/or case management    IF patient discharges home will need the following DME: to be determined (TBD)       SUBJECTIVE:   Patient stated I can try.     OBJECTIVE DATA SUMMARY:   Critical Behavior:  Neurologic State: Eyes open spontaneously  Orientation Level: Oriented X4  Cognition: Appropriate decision making       Spinal diagnosis intervention:  The patient stated 3/3 back precautions when prompted. Reviewed all 3 back precautions, log roll technique, and sitting for 30 minutes at a time. Functional Mobility Training:    Bed Mobility:  Log Rolling: Maximum assistance  Supine to Sit: Maximum assistance;Assist x2  Sit to Supine: Maximum assistance;Assist x2  Scooting: Maximum assistance;Assist x2        Transfers:  Sit to Stand: Maximum assistance;Assist x2 (to RW (maintained x 15 seconds), sit to stand without AD )  Stand to Sit: Maximum assistance;Assist x2                 Balance:  Sitting: Impaired; With support  Sitting - Static: Good (unsupported)  Sitting - Dynamic: Fair (occasional)  Ambulation/Gait Training:   Unsafe to attempt at this time           Pain Ratin/10 beginning of session; 10/10 at end of session    Activity Tolerance:   Fair and requires rest breaks    After treatment patient left in no apparent distress:   Call bell within reach and Caregiver / family present    COMMUNICATION/COLLABORATION:   The patients plan of care was discussed with: Occupational therapist.     Emperatriz Stallings, PT   Time Calculation: 27 mins

## 2022-01-22 NOTE — PROGRESS NOTES
Bedside shift change report given to Uday RN (oncoming nurse) by Juan Barrientos RN (offgoing nurse). Report included the following information SBAR, Kardex, ED Summary, OR Summary, Procedure Summary, Intake/Output, MAR, Cardiac Rhythm NSR, Alarm Parameters  and Dual Neuro Assessment.

## 2022-01-22 NOTE — PROGRESS NOTES
Hematology-Oncology Progress Note      Kelli Brown  1990  [id number]  1/22/2022      Subjective:     Feels she is gaining movement slowly in feet, pain controlled    Allergies: Patient has no known allergies.   Current Facility-Administered Medications   Medication Dose Route Frequency Provider Last Rate Last Admin    bacitracin 500 unit/gram packet 1 Packet  1 Packet Topical NOW Delon Mix MD        bacitracin 500 unit/gram packet             dexAMETHasone (DECADRON) tablet 1 mg  1 mg Oral Q12H Angela Orellana PA        [START ON 1/24/2022] dexAMETHasone (DECADRON) tablet 1 mg  1 mg Oral DAILY Pavel Orellana PA        gabapentin (NEURONTIN) capsule 300 mg  300 mg Oral TID Miguelina Angel C, PA   300 mg at 01/22/22 0815    cyclobenzaprine (FLEXERIL) tablet 5 mg  5 mg Oral BID Miguelina Angel C, PA   5 mg at 01/22/22 3847    bisacodyL (DULCOLAX) suppository 10 mg  10 mg Rectal DAILY PRN Stephanie Rick MD        HYDROmorphone (PF) (DILAUDID) injection 2 mg  2 mg IntraVENous Q3H PRN Stephanie Rick MD   2 mg at 01/22/22 3222    senna-docusate (PERICOLACE) 8.6-50 mg per tablet 1 Tablet  1 Tablet Oral DAILY Stephanie Rick MD   1 Tablet at 01/22/22 0815    HYDROmorphone (DILAUDID) tablet 1 mg  1 mg Oral Q4H PRN Miguelina Angel C, PA   1 mg at 01/21/22 1552    HYDROmorphone (DILAUDID) tablet 2 mg  2 mg Oral Q4H PRN Miguelina Angel C, PA   2 mg at 01/22/22 0441    polyethylene glycol (MIRALAX) packet 17 g  17 g Oral DAILY Angela Orellana PA   17 g at 01/22/22 0847    sodium chloride (NS) flush 5-40 mL  5-40 mL IntraVENous Q8H Mary Beth Pringle MD   10 mL at 01/22/22 2884    sodium chloride (NS) flush 5-40 mL  5-40 mL IntraVENous PRN Mary Beth Pringle MD        acetaminophen (TYLENOL) tablet 650 mg  650 mg Oral Q6H PRN Mary Beth Pringle MD        Or    acetaminophen (TYLENOL) suppository 650 mg  650 mg Rectal Q6H PRN Mary Beth Pringle MD        ondansetron (ZOFRAN ODT) tablet 4 mg  4 mg Oral Q8H PRN Wally Loza MD        Or    [facility]ron [facility]) injection 4 mg  4 mg IntraVENous Q6H PRN Wally Loza MD        metoprolol tartrate (LOPRESSOR) tablet 25 mg  25 mg Oral Q12H Wally Loza MD   25 mg at 01/22/22 0816    fentaNYL (DURAGESIC) 50 mcg/hr patch 1 Patch  1 Patch TransDERmal Q72H Wally Loza MD   1 Patch at 01/20/22 4203    0.9% sodium chloride infusion 250 mL  250 mL IntraVENous PRN Valeria Huff MD        sodium chloride (NS) flush 5-40 mL  5-40 mL IntraVENous Q8H Wally Loza MD   10 mL at 01/22/22 4881    sodium chloride (NS) flush 5-40 mL  5-40 mL IntraVENous PRN Wally Loza MD        acetaminophen (TYLENOL) tablet 650 mg  650 mg Oral Q6H Wally Loza MD   650 mg at 01/22/22 0636    naloxone [facility]) injection 0.4 mg  0.4 mg IntraVENous PRN Wally Loza MD        diphenhydrAMINE (BENADRYL) capsule 25 mg  25 mg Oral Q6H PRN Wally Loza MD        phenol throat spray (CHLORASEPTIC) 1 Spray  1 Spray Oral PRN Wally Loza MD        benzocaine-menthoL (CEPACOL) lozenge 1 Lozenge  1 Lozenge Oral PRN Wally Loza MD   1 Lozenge at 01/20/22 0306     Objective:     Patient Vitals for the past 24 hrs:   BP Temp Pulse Resp SpO2 Weight   01/22/22 0816 (!) 155/93 -- (!) 105 -- -- --   01/22/22 0610 (!) 145/95 98.3 °F (36.8 °C) (!) 101 18 99 % --   01/22/22 0600 -- -- (!) 103 -- -- --   01/22/22 0400 -- -- 97 -- -- --   01/22/22 0215 (!) 148/89 98.1 °F (36.7 °C) 96 17 98 % 93.4 kg (205 lb 14.6 oz)   01/22/22 0200 -- -- 99 -- -- --   01/21/22 2210 121/86 98.3 °F (36.8 °C) (!) 104 15 100 % --   01/21/22 2200 -- -- (!) 107 -- -- --   01/21/22 2043 124/67 -- (!) 115 -- -- --   01/21/22 1958 -- -- (!) 101 -- -- --   01/21/22 1807 (!) 135/96 98.4 °F (36.9 °C) (!) 105 20 98 % --   01/21/22 1400 136/86 98.6 °F (37 °C) 98 18 97 % --   01/21/22 1200 -- -- (!) 102 -- -- --   01/21/22 1001 -- -- 92 -- -- --   01/21/22 1000 135/85 98.7 °F (37.1 °C) 96 18 97 % --       Gen: NAD  HEENT: PERRL, Sclerae anicteric  Cv: RRR without m/r/g  Pulm: CTA bilaterally  Abd: NABS, NTND, No HSM  Ext: No c/c/e    Available labs reviewed:  Labs:    No results found for this or any previous visit (from the past 24 hour(s)). Assessment and Plan     32 y/o woman with stage IV breast CA on presentation, initial biopsy ER+/Her-2 negative. 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 Treated with docetaxel/pertuzumab/trastuzuamb initially, then maintained on Lupron/Carlin. Now with worsening bony disease and lumbar SCC s/p laminectomy. 1. SCC: Some improvement in neurologic function. Subsequent therapy will depend on SCC pathology--if Her-2 positive, likely TDM1 vs trastuzumab deruxtecan. If Her-2 negative, may be a candidate for [facility]. Ultimately defer to Dr. Noam Tomlinson for final recs. Of course, no treatment in the post-op period. Discussed with patient that stains not back but tumor certainly from her breast cancer. We will pick back up on Monday. Please call with any questions. Thank you for allowing us to participate in the care of this very pleasant patient.     Raven Driscoll MD  Hematology/Oncology  Phone [phone]

## 2022-01-22 NOTE — PROGRESS NOTES
Problem: Falls - Risk of  Goal: *Absence of Falls  Description: Document Darlen [place] Fall Risk and appropriate interventions in the flowsheet. Outcome: Progressing Towards Goal  Note: Fall Risk Interventions:  Mobility Interventions: Bed/chair exit alarm,Patient to call before getting OOB         Medication Interventions: Bed/chair exit alarm,Patient to call before getting OOB    Elimination Interventions: Bed/chair exit alarm,Call light in reach,Patient to call for help with toileting needs              Problem: Patient Education: Go to Patient Education Activity  Goal: Patient/Family Education  Outcome: Progressing Towards Goal     Problem: Pain  Goal: *Control of Pain  Outcome: Progressing Towards Goal  Goal: *PALLIATIVE CARE:  Alleviation of Pain  Outcome: Progressing Towards Goal     Problem: Patient Education: Go to Patient Education Activity  Goal: Patient/Family Education  Outcome: Progressing Towards Goal     Problem: Pressure Injury - Risk of  Goal: *Prevention of pressure injury  Description: Document Broderick Scale and appropriate interventions in the flowsheet.   Outcome: Progressing Towards Goal  Note: Pressure Injury Interventions:  Sensory Interventions: Assess changes in LOC,Discuss PT/OT consult with provider         Activity Interventions: Assess need for specialty bed,PT/OT evaluation,Pressure redistribution bed/mattress(bed type)    Mobility Interventions: PT/OT evaluation,Pressure redistribution bed/mattress (bed type),HOB 30 degrees or less    Nutrition Interventions: Document food/fluid/supplement intake    Friction and Shear Interventions: HOB 30 degrees or less                Problem: Patient Education: Go to Patient Education Activity  Goal: Patient/Family Education  Outcome: Progressing Towards Goal     Problem: Patient Education: Go to Patient Education Activity  Goal: Patient/Family Education  Outcome: Progressing Towards Goal     Problem: Discharge Planning  Goal: *Discharge to safe environment  Outcome: Progressing Towards Goal  Goal: *Knowledge of medication management  Outcome: Progressing Towards Goal  Goal: *Knowledge of discharge instructions  Outcome: Progressing Towards Goal

## 2022-01-23 PROCEDURE — 65660000000 HC RM CCU STEPDOWN

## 2022-01-23 PROCEDURE — 97530 THERAPEUTIC ACTIVITIES: CPT

## 2022-01-23 PROCEDURE — 74011250636 HC RX REV CODE- 250/636: Performed by: NEUROLOGICAL SURGERY

## 2022-01-23 PROCEDURE — 74011250637 HC RX REV CODE- 250/637: Performed by: PHYSICIAN ASSISTANT

## 2022-01-23 PROCEDURE — 74011000250 HC RX REV CODE- 250: Performed by: SPECIALIST

## 2022-01-23 PROCEDURE — 74011250637 HC RX REV CODE- 250/637: Performed by: HOSPITALIST

## 2022-01-23 PROCEDURE — 74011250637 HC RX REV CODE- 250/637: Performed by: SPECIALIST

## 2022-01-23 PROCEDURE — 74011250636 HC RX REV CODE- 250/636: Performed by: HOSPITALIST

## 2022-01-23 PROCEDURE — 74011250636 HC RX REV CODE- 250/636: Performed by: PHYSICIAN ASSISTANT

## 2022-01-23 PROCEDURE — 94760 N-INVAS EAR/PLS OXIMETRY 1: CPT

## 2022-01-23 PROCEDURE — 74011000250 HC RX REV CODE- 250: Performed by: STUDENT IN AN ORGANIZED HEALTH CARE EDUCATION/TRAINING PROGRAM

## 2022-01-23 RX ADMIN — HYDROMORPHONE HYDROCHLORIDE 2 MG: 2 INJECTION INTRAMUSCULAR; INTRAVENOUS; SUBCUTANEOUS at 20:08

## 2022-01-23 RX ADMIN — ACETAMINOPHEN 650 MG: 325 TABLET ORAL at 00:02

## 2022-01-23 RX ADMIN — METOPROLOL TARTRATE 25 MG: 25 TABLET, FILM COATED ORAL at 21:52

## 2022-01-23 RX ADMIN — HYDROMORPHONE HYDROCHLORIDE 2 MG: 2 INJECTION INTRAMUSCULAR; INTRAVENOUS; SUBCUTANEOUS at 09:16

## 2022-01-23 RX ADMIN — ACETAMINOPHEN 650 MG: 325 TABLET ORAL at 20:07

## 2022-01-23 RX ADMIN — HYDROMORPHONE HYDROCHLORIDE 2 MG: 2 TABLET ORAL at 20:07

## 2022-01-23 RX ADMIN — GABAPENTIN 300 MG: 300 CAPSULE ORAL at 21:52

## 2022-01-23 RX ADMIN — HYDROMORPHONE HYDROCHLORIDE 2 MG: 2 TABLET ORAL at 13:17

## 2022-01-23 RX ADMIN — DEXAMETHASONE 1 MG: 1 TABLET ORAL at 08:29

## 2022-01-23 RX ADMIN — SODIUM CHLORIDE, PRESERVATIVE FREE 10 ML: 5 INJECTION INTRAVENOUS at 21:52

## 2022-01-23 RX ADMIN — CYCLOBENZAPRINE 5 MG: 10 TABLET, FILM COATED ORAL at 08:29

## 2022-01-23 RX ADMIN — ENOXAPARIN SODIUM 40 MG: 100 INJECTION SUBCUTANEOUS at 11:23

## 2022-01-23 RX ADMIN — CYCLOBENZAPRINE 5 MG: 10 TABLET, FILM COATED ORAL at 17:13

## 2022-01-23 RX ADMIN — GABAPENTIN 300 MG: 300 CAPSULE ORAL at 16:12

## 2022-01-23 RX ADMIN — GABAPENTIN 300 MG: 300 CAPSULE ORAL at 08:29

## 2022-01-23 RX ADMIN — SODIUM CHLORIDE, PRESERVATIVE FREE 10 ML: 5 INJECTION INTRAVENOUS at 13:48

## 2022-01-23 RX ADMIN — SODIUM CHLORIDE, PRESERVATIVE FREE 10 ML: 5 INJECTION INTRAVENOUS at 06:07

## 2022-01-23 RX ADMIN — BISACODYL 10 MG: 10 SUPPOSITORY RECTAL at 08:27

## 2022-01-23 RX ADMIN — HYDROMORPHONE HYDROCHLORIDE 2 MG: 2 INJECTION INTRAMUSCULAR; INTRAVENOUS; SUBCUTANEOUS at 17:13

## 2022-01-23 RX ADMIN — ACETAMINOPHEN 650 MG: 325 TABLET ORAL at 06:06

## 2022-01-23 RX ADMIN — POLYETHYLENE GLYCOL 3350 17 G: 17 POWDER, FOR SOLUTION ORAL at 08:32

## 2022-01-23 RX ADMIN — HYDROMORPHONE HYDROCHLORIDE 2 MG: 2 TABLET ORAL at 04:29

## 2022-01-23 RX ADMIN — METOPROLOL TARTRATE 25 MG: 25 TABLET, FILM COATED ORAL at 08:30

## 2022-01-23 RX ADMIN — SENNOSIDES AND DOCUSATE SODIUM 1 TABLET: 50; 8.6 TABLET ORAL at 08:29

## 2022-01-23 NOTE — PROGRESS NOTES
Hospitalist Progress Note      Hospital summary: 33 y.o lady w/ metastatic breast cancer who presented as a transfer from [facility] for management of cord compression from spine mets. Assessment/Plan:  Cord compression in the setting of metastatic breast cancer:  -s/p T11-L3 laminectomy on 1/19  -continue dexamethasone  -PT/OT as able  -neurosurgery and oncology consulted  -f/u pathology    Bilateral leg pain: some of this seems to be neuropathic  -continue fentanyl patch, PRN Dilaudid  -increase gabapentin to 300 mg TID  -bowel regimen - suppository today for constipation    Code status: full  DVT prophylaxis: sq Lovenox  Disposition: rehab when medically appropriate  ----------------------------------------------    CC: bilateral leg pain    S: pain improved compared to yesterday, no n/v/d, still no bm    Review of Systems:  Pertinent items are noted in HPI. O:  Visit Vitals  /80   Pulse (!) 107   Temp 99.1 °F (37.3 °C)   Resp 19   Ht 5' 2\" (1.575 m)   Wt 93.4 kg (205 lb 14.6 oz)   SpO2 98%   BMI 37.66 kg/m²       PHYSICAL EXAM:  Gen: NAD, non-toxic, obese  HEENT: anicteric sclerae, normal conjunctiva  Neck: supple, trachea midline, no adenopathy  Heart: RRR, no MRG, no JVD, no peripheral edema  Lungs: CTA b/l, non-labored respirations  Abd: soft, NT, ND, BS+  Extr: warm  Skin: dry, no rash  Neuro: CN II-XII grossly intact, normal speech, minimal b/l LE movement 2/5  Psych: normal mood, appropriate affect      Intake/Output Summary (Last 24 hours) at 1/23/2022 1340  Last data filed at 1/23/2022 5502  Gross per 24 hour   Intake --   Output 950 ml   Net -950 ml        Recent labs & imaging reviewed:  No results found for this or any previous visit (from the past 24 hour(s)).   Recent Labs     01/21/22  0117   WBC 10.0   HGB 8.3*   HCT 26.6*        Recent Labs     01/21/22  0117   *   K 4.2      CO2 29   BUN 12   CREA 0.52*   *   CA 9.0   MG 2.1 No results for input(s): ALT, AP, TBIL, TBILI, TP, ALB, GLOB, GGT, AML, LPSE in the last 72 hours. No lab exists for component: SGOT, GPT, AMYP, HLPSE  No results for input(s): INR, PTP, APTT, INREXT, INREXT in the last 72 hours. No results for input(s): FE, TIBC, PSAT, FERR in the last 72 hours. No results found for: FOL, RBCF   No results for input(s): PH, PCO2, PO2 in the last 72 hours. No results for input(s): CPK, CKNDX, TROIQ in the last 72 hours.     No lab exists for component: CPKMB  No results found for: CHOL, CHOLX, CHLST, CHOLV, HDL, HDLP, LDL, LDLC, DLDLP, TGLX, TRIGL, TRIGP, CHHD, CHHDX  No results found for: GLUCPOC  No results found for: COLOR, APPRN, SPGRU, REFSG, ROMERO, PROTU, GLUCU, KETU, BILU, UROU, SANAZ, LEUKU, GLUKE, EPSU, BACTU, WBCU, RBCU, CASTS, UCRY    Med list reviewed  Current Facility-Administered Medications   Medication Dose Route Frequency    enoxaparin (LOVENOX) injection 40 mg  40 mg SubCUTAneous Q24H    [START ON 1/24/2022] dexAMETHasone (DECADRON) tablet 1 mg  1 mg Oral DAILY    gabapentin (NEURONTIN) capsule 300 mg  300 mg Oral TID    cyclobenzaprine (FLEXERIL) tablet 5 mg  5 mg Oral BID    bisacodyL (DULCOLAX) suppository 10 mg  10 mg Rectal DAILY PRN    HYDROmorphone (PF) (DILAUDID) injection 2 mg  2 mg IntraVENous Q3H PRN    senna-docusate (PERICOLACE) 8.6-50 mg per tablet 1 Tablet  1 Tablet Oral DAILY    HYDROmorphone (DILAUDID) tablet 1 mg  1 mg Oral Q4H PRN    HYDROmorphone (DILAUDID) tablet 2 mg  2 mg Oral Q4H PRN    polyethylene glycol (MIRALAX) packet 17 g  17 g Oral DAILY    sodium chloride (NS) flush 5-40 mL  5-40 mL IntraVENous Q8H    sodium chloride (NS) flush 5-40 mL  5-40 mL IntraVENous PRN    acetaminophen (TYLENOL) tablet 650 mg  650 mg Oral Q6H PRN    Or    acetaminophen (TYLENOL) suppository 650 mg  650 mg Rectal Q6H PRN    ondansetron (ZOFRAN ODT) tablet 4 mg  4 mg Oral Q8H PRN    Or    ondansetron (ZOFRAN) injection 4 mg  4 mg IntraVENous Q6H PRN    metoprolol tartrate (LOPRESSOR) tablet 25 mg  25 mg Oral Q12H    fentaNYL (DURAGESIC) 50 mcg/hr patch 1 Patch  1 Patch TransDERmal Q72H    0.9% sodium chloride infusion 250 mL  250 mL IntraVENous PRN    sodium chloride (NS) flush 5-40 mL  5-40 mL IntraVENous Q8H    sodium chloride (NS) flush 5-40 mL  5-40 mL IntraVENous PRN    acetaminophen (TYLENOL) tablet 650 mg  650 mg Oral Q6H    naloxone (NARCAN) injection 0.4 mg  0.4 mg IntraVENous PRN    diphenhydrAMINE (BENADRYL) capsule 25 mg  25 mg Oral Q6H PRN    phenol throat spray (CHLORASEPTIC) 1 Spray  1 Spray Oral PRN    benzocaine-menthoL (CEPACOL) lozenge 1 Lozenge  1 Lozenge Oral PRN       Care Plan discussed with:  Patient/Family and Nurse    Yoel Newton, MD  Internal Medicine  Date of Service: 1/23/2022

## 2022-01-23 NOTE — PROGRESS NOTES
Problem: Mobility Impaired (Adult and Pediatric)  Goal: *Acute Goals and Plan of Care (Insert Text)  Description: FUNCTIONAL STATUS PRIOR TO ADMISSION: Patient was independent and active without use of DME. She has history of breast cancer with mets to the spine. HOME SUPPORT PRIOR TO ADMISSION: Pt lives with her 9year old son. Her mother is also involved in her care. Physical Therapy Goals  Initiated 1/20/2022    1. Patient will move from supine to sit and sit to supine , scoot up and down, and roll side to side in bed with moderate assistance  within 4 days. 2. Patient will perform sit to stand with maximal assistance within 4 days. 3. Patient will ambulate with maximal assistance for 5 feet with the least restrictive device within 4 days. 4. Patient will verbalize and demonstrate understanding of spinal precautions (No bending, lifting greater than 5 lbs, or twisting; log-roll technique; frequent repositioning as instructed) within 4 days. Outcome: Progressing Towards Goal  PHYSICAL THERAPY TREATMENT  Patient: Aron Youssef (53 y.o. female)  Date: 1/23/2022  Diagnosis: Cord compression syndrome (HCC) [G95.20] <principal problem not specified>  Procedure(s) (LRB):  T11 - L2 LAMINECTOMY AND FUSION WITH O-ARM (N/A) 4 Days Post-Op  Precautions: Fall,Back, brace when OOB  Chart, physical therapy assessment, plan of care and goals were reviewed. ASSESSMENT  Patient continues with skilled PT services and is progressing slowly towards goals. Pt continues to present with pain, generalized weakness (BLEs), impaired balance, decreased activity tolerance, tachycardia, and overall decline in functional mobility. Session timing coordinated with nurse to align with pain medication administration. Pt transferred able to recall 3/3 precautions and verbalize steps for log roll technique. Transferred supine>sit with maxA x2, sit<>stand with maxA x2, and took a few steps bed>chair with maxA x2 using RW.   HR elevated as high as 176 bpm with activity, recovered to 120s with seated rest.  Ended session in chair with brace on, all needs met, and RN updated. Continuing to recommend IPR upon discharge. Addendum: Saw pt second time to assist pt in returning to bed. Pt transferred sit<>stand and took a few steps chair to bed with maxA x2 and use of RW. Performed log roll sit>supine and repositioned in bed with all need met. Current Level of Function Impacting Discharge (mobility/balance): maxA x2 for transfers     Other factors to consider for discharge: tachycardic, pain, fall risk, PMH, independent baseline          PLAN :  Patient continues to benefit from skilled intervention to address the above impairments. Continue treatment per established plan of care. to address goals. Recommendation for discharge: (in order for the patient to meet his/her long term goals)  Therapy 3 hours per day 5-7 days per week    This discharge recommendation:  Has been made in collaboration with the attending provider and/or case management    IF patient discharges home will need the following DME: to be determined (TBD)       SUBJECTIVE:   Patient stated My R leg hurts.  improved with RLE elevation     OBJECTIVE DATA SUMMARY:   Critical Behavior:  Neurologic State: Alert,Eyes open spontaneously  Orientation Level: Oriented X4  Cognition: Follows commands,Appropriate decision making     Functional Mobility Training:  Bed Mobility:  Rolling: Maximum assistance  Supine to Sit: Maximum assistance;Assist x2  Sit to Supine: Maximum assistance;Assist x2  Scooting: Maximum assistance;Assist x2    Transfers:  Sit to Stand: Maximum assistance;Assist x2  Stand to Sit: Maximum assistance;Assist x2  Bed to Chair: Maximum assistance;Assist x2; Additional time; Adaptive equipment (with RW)    Balance:  Sitting: Impaired  Sitting - Static: Good (unsupported)  Sitting - Dynamic: Fair (occasional)  Standing: Impaired; With support  Standing - Static: Poor;Constant support  Standing - Dynamic : Poor;Constant support    Activity Tolerance:   Poor    After treatment patient left in no apparent distress:   Sitting in chair and Call bell within reach    COMMUNICATION/COLLABORATION:   The patients plan of care was discussed with: Occupational therapist and Registered nurse.      Catrina May, PT, DPT   Time Calculation: 24 mins

## 2022-01-23 NOTE — PROGRESS NOTES
Problem: Falls - Risk of  Goal: *Absence of Falls  Description: Document Lavelle Nix Fall Risk and appropriate interventions in the flowsheet. Outcome: Progressing Towards Goal  Note: Fall Risk Interventions:  Mobility Interventions: Patient to call before getting OOB         Medication Interventions: Patient to call before getting OOB    Elimination Interventions: Call light in reach,Patient to call for help with toileting needs              Problem: Patient Education: Go to Patient Education Activity  Goal: Patient/Family Education  Outcome: Progressing Towards Goal     Problem: Pain  Goal: *Control of Pain  Outcome: Progressing Towards Goal  Goal: *PALLIATIVE CARE:  Alleviation of Pain  Outcome: Progressing Towards Goal     Problem: Patient Education: Go to Patient Education Activity  Goal: Patient/Family Education  Outcome: Progressing Towards Goal     Problem: Pressure Injury - Risk of  Goal: *Prevention of pressure injury  Description: Document Broderick Scale and appropriate interventions in the flowsheet.   Outcome: Progressing Towards Goal  Note: Pressure Injury Interventions:  Sensory Interventions: Assess changes in LOC,Discuss PT/OT consult with provider         Activity Interventions: PT/OT evaluation,Pressure redistribution bed/mattress(bed type)    Mobility Interventions: Pressure redistribution bed/mattress (bed type),PT/OT evaluation    Nutrition Interventions: Document food/fluid/supplement intake    Friction and Shear Interventions: HOB 30 degrees or less                Problem: Patient Education: Go to Patient Education Activity  Goal: Patient/Family Education  Outcome: Progressing Towards Goal     Problem: Patient Education: Go to Patient Education Activity  Goal: Patient/Family Education  Outcome: Progressing Towards Goal     Problem: Discharge Planning  Goal: *Discharge to safe environment  Outcome: Progressing Towards Goal  Goal: *Knowledge of medication management  Outcome: Progressing Towards Goal  Goal: *Knowledge of discharge instructions  Outcome: Progressing Towards Goal     Problem: Patient Education: Go to Patient Education Activity  Goal: Patient/Family Education  Outcome: Progressing Towards Goal     Problem: Patient Education: Go to Patient Education Activity  Goal: Patient/Family Education  Outcome: Progressing Towards Goal

## 2022-01-23 NOTE — PROGRESS NOTES
Bedside shift change report given to Uday RN (oncoming nurse) by Chas Montero RN (offgoing nurse). Report included the following information SBAR, Kardex, ED Summary, Intake/Output, MAR, Med Rec Status, Cardiac Rhythm NSR, Alarm Parameters  and Dual Neuro Assessment.

## 2022-01-23 NOTE — PROGRESS NOTES
Problem: Falls - Risk of  Goal: *Absence of Falls  Description: Document Sondra Tovar Fall Risk and appropriate interventions in the flowsheet. Outcome: Progressing Towards Goal  Note: Fall Risk Interventions:  Mobility Interventions: Bed/chair exit alarm,Patient to call before getting OOB         Medication Interventions: Bed/chair exit alarm,Patient to call before getting OOB    Elimination Interventions: Bed/chair exit alarm,Call light in reach,Patient to call for help with toileting needs              Problem: Patient Education: Go to Patient Education Activity  Goal: Patient/Family Education  Outcome: Progressing Towards Goal     Problem: Pain  Goal: *Control of Pain  Outcome: Progressing Towards Goal  Goal: *PALLIATIVE CARE:  Alleviation of Pain  Outcome: Progressing Towards Goal     Problem: Patient Education: Go to Patient Education Activity  Goal: Patient/Family Education  Outcome: Progressing Towards Goal     Problem: Pressure Injury - Risk of  Goal: *Prevention of pressure injury  Description: Document Broderick Scale and appropriate interventions in the flowsheet.   Outcome: Progressing Towards Goal  Note: Pressure Injury Interventions:  Sensory Interventions: Assess changes in LOC,Discuss PT/OT consult with provider         Activity Interventions: PT/OT evaluation,Pressure redistribution bed/mattress(bed type)    Mobility Interventions: HOB 30 degrees or less,PT/OT evaluation,Pressure redistribution bed/mattress (bed type)    Nutrition Interventions: Document food/fluid/supplement intake,Discuss nutritional consult with provider    Friction and Shear Interventions: HOB 30 degrees or less                Problem: Patient Education: Go to Patient Education Activity  Goal: Patient/Family Education  Outcome: Progressing Towards Goal     Problem: Patient Education: Go to Patient Education Activity  Goal: Patient/Family Education  Outcome: Progressing Towards Goal     Problem: Patient Education: Go to Patient Education Activity  Goal: Patient/Family Education  Outcome: Progressing Towards Goal     Problem: Discharge Planning  Goal: *Discharge to safe environment  Outcome: Progressing Towards Goal  Goal: *Knowledge of medication management  Outcome: Progressing Towards Goal  Goal: *Knowledge of discharge instructions  Outcome: Progressing Towards Goal

## 2022-01-23 NOTE — PROGRESS NOTES
Bedside shift change report given to Selam Blackwell RN (oncoming nurse) by Peter Clay RN (offgoing nurse). Report included the following information SBAR, ED Summary and Procedure Summary.

## 2022-01-24 LAB
ANION GAP SERPL CALC-SCNC: 7 MMOL/L (ref 5–15)
BUN SERPL-MCNC: 11 MG/DL (ref 6–20)
BUN/CREAT SERPL: 26 (ref 12–20)
CALCIUM SERPL-MCNC: 9.4 MG/DL (ref 8.5–10.1)
CHLORIDE SERPL-SCNC: 95 MMOL/L (ref 97–108)
CO2 SERPL-SCNC: 28 MMOL/L (ref 21–32)
CREAT SERPL-MCNC: 0.42 MG/DL (ref 0.55–1.02)
ERYTHROCYTE [DISTWIDTH] IN BLOOD BY AUTOMATED COUNT: 13.9 % (ref 11.5–14.5)
GLUCOSE SERPL-MCNC: 108 MG/DL (ref 65–100)
HCT VFR BLD AUTO: 28.9 % (ref 35–47)
HGB BLD-MCNC: 9.2 G/DL (ref 11.5–16)
MAGNESIUM SERPL-MCNC: 2.1 MG/DL (ref 1.6–2.4)
MCH RBC QN AUTO: 26.1 PG (ref 26–34)
MCHC RBC AUTO-ENTMCNC: 31.8 G/DL (ref 30–36.5)
MCV RBC AUTO: 81.9 FL (ref 80–99)
NRBC # BLD: 0.05 K/UL (ref 0–0.01)
NRBC BLD-RTO: 0.6 PER 100 WBC
PLATELET # BLD AUTO: 303 K/UL (ref 150–400)
PMV BLD AUTO: 9.7 FL (ref 8.9–12.9)
POTASSIUM SERPL-SCNC: 5.1 MMOL/L (ref 3.5–5.1)
RBC # BLD AUTO: 3.53 M/UL (ref 3.8–5.2)
SODIUM SERPL-SCNC: 130 MMOL/L (ref 136–145)
WBC # BLD AUTO: 8.9 K/UL (ref 3.6–11)

## 2022-01-24 PROCEDURE — 74011250637 HC RX REV CODE- 250/637: Performed by: SPECIALIST

## 2022-01-24 PROCEDURE — 74011250637 HC RX REV CODE- 250/637: Performed by: PHYSICIAN ASSISTANT

## 2022-01-24 PROCEDURE — 97535 SELF CARE MNGMENT TRAINING: CPT

## 2022-01-24 PROCEDURE — 74011250636 HC RX REV CODE- 250/636: Performed by: PHYSICIAN ASSISTANT

## 2022-01-24 PROCEDURE — 94760 N-INVAS EAR/PLS OXIMETRY 1: CPT

## 2022-01-24 PROCEDURE — 97530 THERAPEUTIC ACTIVITIES: CPT

## 2022-01-24 PROCEDURE — 83735 ASSAY OF MAGNESIUM: CPT

## 2022-01-24 PROCEDURE — 74011250636 HC RX REV CODE- 250/636: Performed by: HOSPITALIST

## 2022-01-24 PROCEDURE — 74011250636 HC RX REV CODE- 250/636: Performed by: NEUROLOGICAL SURGERY

## 2022-01-24 PROCEDURE — 65660000000 HC RM CCU STEPDOWN

## 2022-01-24 PROCEDURE — 74011000250 HC RX REV CODE- 250: Performed by: STUDENT IN AN ORGANIZED HEALTH CARE EDUCATION/TRAINING PROGRAM

## 2022-01-24 PROCEDURE — 85027 COMPLETE CBC AUTOMATED: CPT

## 2022-01-24 PROCEDURE — 74011000250 HC RX REV CODE- 250: Performed by: SPECIALIST

## 2022-01-24 PROCEDURE — 36415 COLL VENOUS BLD VENIPUNCTURE: CPT

## 2022-01-24 PROCEDURE — 80048 BASIC METABOLIC PNL TOTAL CA: CPT

## 2022-01-24 RX ADMIN — HYDROMORPHONE HYDROCHLORIDE 2 MG: 2 INJECTION INTRAMUSCULAR; INTRAVENOUS; SUBCUTANEOUS at 08:26

## 2022-01-24 RX ADMIN — ACETAMINOPHEN 650 MG: 325 TABLET ORAL at 14:32

## 2022-01-24 RX ADMIN — SODIUM CHLORIDE, PRESERVATIVE FREE 10 ML: 5 INJECTION INTRAVENOUS at 08:27

## 2022-01-24 RX ADMIN — ENOXAPARIN SODIUM 40 MG: 100 INJECTION SUBCUTANEOUS at 11:31

## 2022-01-24 RX ADMIN — HYDROMORPHONE HYDROCHLORIDE 2 MG: 2 TABLET ORAL at 23:24

## 2022-01-24 RX ADMIN — METOPROLOL TARTRATE 25 MG: 25 TABLET, FILM COATED ORAL at 09:58

## 2022-01-24 RX ADMIN — GABAPENTIN 300 MG: 300 CAPSULE ORAL at 16:30

## 2022-01-24 RX ADMIN — HYDROMORPHONE HYDROCHLORIDE 2 MG: 2 TABLET ORAL at 11:31

## 2022-01-24 RX ADMIN — HYDROMORPHONE HYDROCHLORIDE 2 MG: 2 TABLET ORAL at 16:30

## 2022-01-24 RX ADMIN — GABAPENTIN 300 MG: 300 CAPSULE ORAL at 09:57

## 2022-01-24 RX ADMIN — ACETAMINOPHEN 650 MG: 325 TABLET ORAL at 20:35

## 2022-01-24 RX ADMIN — SODIUM CHLORIDE, PRESERVATIVE FREE 10 ML: 5 INJECTION INTRAVENOUS at 22:05

## 2022-01-24 RX ADMIN — CYCLOBENZAPRINE 5 MG: 10 TABLET, FILM COATED ORAL at 18:24

## 2022-01-24 RX ADMIN — HYDROMORPHONE HYDROCHLORIDE 2 MG: 2 INJECTION INTRAMUSCULAR; INTRAVENOUS; SUBCUTANEOUS at 02:54

## 2022-01-24 RX ADMIN — ACETAMINOPHEN 650 MG: 325 TABLET ORAL at 02:54

## 2022-01-24 RX ADMIN — SODIUM CHLORIDE, PRESERVATIVE FREE 10 ML: 5 INJECTION INTRAVENOUS at 14:32

## 2022-01-24 RX ADMIN — SODIUM CHLORIDE, PRESERVATIVE FREE 5 ML: 5 INJECTION INTRAVENOUS at 06:00

## 2022-01-24 RX ADMIN — ACETAMINOPHEN 650 MG: 325 TABLET ORAL at 08:56

## 2022-01-24 RX ADMIN — METOPROLOL TARTRATE 25 MG: 25 TABLET, FILM COATED ORAL at 20:35

## 2022-01-24 RX ADMIN — GABAPENTIN 300 MG: 300 CAPSULE ORAL at 22:05

## 2022-01-24 RX ADMIN — SODIUM CHLORIDE, PRESERVATIVE FREE 10 ML: 5 INJECTION INTRAVENOUS at 22:04

## 2022-01-24 RX ADMIN — DEXAMETHASONE 1 MG: 1 TABLET ORAL at 09:57

## 2022-01-24 RX ADMIN — CYCLOBENZAPRINE 5 MG: 10 TABLET, FILM COATED ORAL at 09:56

## 2022-01-24 NOTE — PROGRESS NOTES
Transition of Care:      RUR-  5%    Disposition: [facility]PR, accepted, auth pending    Barrier: Not medically stable - possibly tomorrow, anticipate will not hear about IPR auth until Weds    Transport: BLS    Follow-up: PCP/Specialist    Family Contact: Mateus Granado mother [phone]    Spoke with Janie Bustillos with [facility] [phone] and they anticipate will not hear from [place] until at least Weds. Per Attending MD oncology plans for no chemo until patient heals from her surgery.   VALERIE Feliz

## 2022-01-24 NOTE — PROGRESS NOTES
Problem: Falls - Risk of  Goal: *Absence of Falls  Description: Document Ayla Dawkins Fall Risk and appropriate interventions in the flowsheet. Outcome: Progressing Towards Goal  Note: Fall Risk Interventions:  Mobility Interventions: Patient to call before getting OOB         Medication Interventions: Patient to call before getting OOB    Elimination Interventions: Call light in reach,Patient to call for help with toileting needs              Problem: Patient Education: Go to Patient Education Activity  Goal: Patient/Family Education  Outcome: Progressing Towards Goal     Problem: Pain  Goal: *Control of Pain  Outcome: Progressing Towards Goal  Goal: *PALLIATIVE CARE:  Alleviation of Pain  Outcome: Progressing Towards Goal     Problem: Patient Education: Go to Patient Education Activity  Goal: Patient/Family Education  Outcome: Progressing Towards Goal     Problem: Pressure Injury - Risk of  Goal: *Prevention of pressure injury  Description: Document Broderick Scale and appropriate interventions in the flowsheet.   Outcome: Progressing Towards Goal  Note: Pressure Injury Interventions:  Sensory Interventions: Assess changes in LOC,Discuss PT/OT consult with provider         Activity Interventions: PT/OT evaluation,Pressure redistribution bed/mattress(bed type)    Mobility Interventions: Pressure redistribution bed/mattress (bed type),PT/OT evaluation    Nutrition Interventions: Document food/fluid/supplement intake    Friction and Shear Interventions: HOB 30 degrees or less                Problem: Patient Education: Go to Patient Education Activity  Goal: Patient/Family Education  Outcome: Progressing Towards Goal     Problem: Patient Education: Go to Patient Education Activity  Goal: Patient/Family Education  Outcome: Progressing Towards Goal     Problem: Discharge Planning  Goal: *Discharge to safe environment  Outcome: Progressing Towards Goal  Goal: *Knowledge of medication management  Outcome: Progressing Towards Goal  Goal: *Knowledge of discharge instructions  Outcome: Progressing Towards Goal

## 2022-01-24 NOTE — PROGRESS NOTES
Problem: Mobility Impaired (Adult and Pediatric)  Goal: *Acute Goals and Plan of Care (Insert Text)  Description: FUNCTIONAL STATUS PRIOR TO ADMISSION: Patient was independent and active without use of DME. She has history of breast cancer with mets to the spine. HOME SUPPORT PRIOR TO ADMISSION: Pt lives with her 9year old son. Her mother is also involved in her care. Physical Therapy Goals    Reassessed on 1/24/2022  1. Patient will move from supine to sit and sit to supine , scoot up and down, and roll side to side in bed with moderate assistance  within 7 days. 2. Patient will perform sit to stand with maximal assistance within 7 days. 3. Patient will ambulate with maximal assistance for 5 feet with the least restrictive device within 7 days. 4. Patient will verbalize and demonstrate understanding of spinal precautions (No bending, lifting greater than 5 lbs, or twisting; log-roll technique; frequent repositioning as instructed) within 7 days. Goal met 1/24/2022     Initiated 1/20/2022  1. Patient will move from supine to sit and sit to supine , scoot up and down, and roll side to side in bed with moderate assistance  within 4 days. 2. Patient will perform sit to stand with maximal assistance within 4 days. 3. Patient will ambulate with maximal assistance for 5 feet with the least restrictive device within 4 days. 4. Patient will verbalize and demonstrate understanding of spinal precautions (No bending, lifting greater than 5 lbs, or twisting; log-roll technique; frequent repositioning as instructed) within 4 days.         Outcome: Progressing Towards Goal  PHYSICAL THERAPY TREATMENT: WEEKLY REASSESSMENT  Patient: Elder Frias (14 y.o. female)  Date: 1/24/2022  Primary Diagnosis: Cord compression syndrome (HCC) [G95.20]  Procedure(s) (LRB):  T11 - L2 LAMINECTOMY AND FUSION WITH O-ARM (N/A) 5 Days Post-Op   Precautions:   Fall,Back, Brace when out of bed      ASSESSMENT  Patient continues with skilled PT services and is progressing towards goals. Pt presents with pain (back), generalized weakness, impaired balance, decreased activity tolerance and tachycardia. Pt able to verbally recall 3/3 spinal precautions and adhere to them during mobility. Pt required maxA for log roll and supine > sit. Required maxA x2 for sit<> stand and stand pivot transfer to chair with a RW. During mobility pts HR increased as high as 170 bpm, but came down quickly with seated rest in chair. Pt left in chair with TLSO on. Recommending IPR upon discharge. Current Level of Function Impacting Discharge (mobility/balance): maxA x2 stand pivot transfer to chair     Other factors to consider for discharge: independent baseline, PMH, supportive family, pain         PLAN :  Goals have been updated based on progression since last assessment. Patient continues to benefit from skilled intervention to address the above impairments. Recommendations and Planned Interventions: bed mobility training, transfer training, gait training, therapeutic exercises, neuromuscular re-education, patient and family training/education, and therapeutic activities      Frequency/Duration: Patient will be followed by physical therapy:  Daily to address goals. Recommendation for discharge: (in order for the patient to meet his/her long term goals)  Therapy 3 hours per day 5-7 days per week    This discharge recommendation:  Has not yet been discussed the attending provider and/or case management    IF patient discharges home will need the following DME: to be determined (TBD)         SUBJECTIVE:   Patient stated I feel ok.     OBJECTIVE DATA SUMMARY:   HISTORY:    History reviewed. No pertinent past medical history. No past surgical history on file.       Home Situation  Home Environment: Private residence  # Steps to Enter: 3  Rails to Enter: Yes  Hand Rails : Left  One/Two Story Residence: Two story  # of Interior Steps: [address]: Left  Living Alone: No  Support Systems: Parent(s)  Patient Expects to be Discharged to[de-identified] Other: (IPR vs HH)  Current DME Used/Available at Home: Cane, straight,Walker, rolling  Tub or Shower Type: Shower    EXAMINATION/PRESENTATION/DECISION MAKING:   Critical Behavior:  Neurologic State: Alert,Eyes open spontaneously  Orientation Level: Oriented X4  Cognition: Follows commands,Appropriate decision making     Functional Mobility:  Bed Mobility:  Rolling: Maximum assistance  Supine to Sit: Maximum assistance;Assist x1     Scooting: Maximum assistance;Assist x1  Transfers:  Sit to Stand: Maximum assistance;Assist x2  Stand to Sit: Maximum assistance;Assist x2        Bed to Chair: Maximum assistance;Assist x2;Adaptive equipment (RW)              Balance:   Sitting: Impaired  Sitting - Static: Good (unsupported)  Sitting - Dynamic: Fair (occasional)  Standing: Impaired  Standing - Static: Poor;Constant support  Standing - Dynamic : Poor;Constant support      Activity Tolerance:   Fair    After treatment patient left in no apparent distress:   Sitting in chair, Call bell within reach, and Caregiver / family present    COMMUNICATION/EDUCATION:   The patients plan of care was discussed with: Registered nurse. Fall prevention education was provided and the patient/caregiver indicated understanding., Patient/family have participated as able in goal setting and plan of care. , and Patient/family agree to work toward stated goals and plan of care. Thank you for this referral.  PARVIZ Levy   Time Calculation: 18 mins      Regarding student involvement in patient care:  A student participated in this treatment session. Per CMS Medicare statements and APTA guidelines I certify that the following was true:  1. I was present and directly observed the entire session. 2. I made all skilled judgments and clinical decisions regarding care.   3. I am the practitioner responsible for assessment, treatment, and documentation.

## 2022-01-24 NOTE — PROGRESS NOTES
Palliative Medicine  [place]: [phone] ([phone])  [facility]: [phone] ([phone])      The Palliative Medicine SW met with the patient at bedside for psychosocial support, her sister Ascencion is also visiting at bedside. Patient appeared to be resting, brief visit today- patient shared that her pain is under control currently, she does not feel like there needs to be any adjustments. She shares that her plan is for rehabilitation after hospitalization. Patient polite, appeared tired- kept visit brief today due to appearing tired and family visiting at bedside. The patient shares that she has support, her and her sister denied any specific worries or concerns at this time- she is agreeable for SW to follow-up later this week. Palliative Medicine will follow along with you, please call if we can be of further support to patient. Thank you for including Palliative Medicine in the care of [facility].        Canelo Hilliard, LILY  [phone]

## 2022-01-24 NOTE — PROGRESS NOTES
Hospitalist Progress Note      Hospital summary: 33 y.o lady w/ metastatic breast cancer who presented as a transfer from [facility] for management of cord compression from spine mets. Assessment/Plan:  Cord compression in the setting of metastatic breast cancer:  -s/p T11-L3 laminectomy on 1/19  -continue dexamethasone  -PT/OT as able  -neurosurgery and oncology consulted  -f/u pathology    Bilateral leg pain: some of this seems to be neuropathic  -continue fentanyl patch, PRN Dilaudid; d/c IV   -increase gabapentin to 300 mg TID  -continue bowel regimen    Sinus tachycardia: asymptomatic; monitor. She is on DVT ppx. Code status: full  DVT prophylaxis: sq Lovenox  Disposition: rehab when medically appropriate  ----------------------------------------------    CC: bilateral leg pain    S: pain improved compared to yesterday, no n/v/d, had 2 bm's    Review of Systems:  Pertinent items are noted in HPI.     O:  Visit Vitals  BP (!) 139/98   Pulse (!) 110   Temp 97.8 °F (36.6 °C)   Resp 12   Ht 5' 2\" (1.575 m)   Wt 97.7 kg (215 lb 6.2 oz)   SpO2 97%   BMI 39.40 kg/m²       PHYSICAL EXAM:  Gen: NAD, non-toxic, obese  HEENT: anicteric sclerae, normal conjunctiva  Neck: supple, trachea midline, no adenopathy  Heart: RRR, no MRG, no JVD, no peripheral edema  Lungs: CTA b/l, non-labored respirations  Abd: soft, NT, ND, BS+  Extr: warm  Skin: dry, no rash  Neuro: CN II-XII grossly intact, normal speech, minimal b/l LE movement 2/5 (unchanged)  Psych: normal mood, appropriate affect      Intake/Output Summary (Last 24 hours) at 1/24/2022 1358  Last data filed at 1/23/2022 1600  Gross per 24 hour   Intake --   Output 1500 ml   Net -1500 ml        Recent labs & imaging reviewed:  Recent Results (from the past 24 hour(s))   CBC W/O DIFF    Collection Time: 01/24/22  2:55 AM   Result Value Ref Range    WBC 8.9 3.6 - 11.0 K/uL    RBC 3.53 (L) 3.80 - 5.20 M/uL    HGB 9.2 (L) 11.5 - 16.0 g/dL HCT 28.9 (L) 35.0 - 47.0 %    MCV 81.9 80.0 - 99.0 FL    MCH 26.1 26.0 - 34.0 PG    MCHC 31.8 30.0 - 36.5 g/dL    RDW 13.9 11.5 - 14.5 %    PLATELET 474 947 - 288 K/uL    MPV 9.7 8.9 - 12.9 FL    NRBC 0.6 (H) 0  WBC    ABSOLUTE NRBC 0.05 (H) 0.00 - 8.82 K/uL   METABOLIC PANEL, BASIC    Collection Time: 01/24/22  2:55 AM   Result Value Ref Range    Sodium 130 (L) 136 - 145 mmol/L    Potassium 5.1 3.5 - 5.1 mmol/L    Chloride 95 (L) 97 - 108 mmol/L    CO2 28 21 - 32 mmol/L    Anion gap 7 5 - 15 mmol/L    Glucose 108 (H) 65 - 100 mg/dL    BUN 11 6 - 20 MG/DL    Creatinine 0.42 (L) 0.55 - 1.02 MG/DL    BUN/Creatinine ratio 26 (H) 12 - 20      GFR est AA >60 >60 ml/min/1.73m2    GFR est non-AA >60 >60 ml/min/1.73m2    Calcium 9.4 8.5 - 10.1 MG/DL   MAGNESIUM    Collection Time: 01/24/22  2:55 AM   Result Value Ref Range    Magnesium 2.1 1.6 - 2.4 mg/dL     Recent Labs     01/24/22  0255   WBC 8.9   HGB 9.2*   HCT 28.9*        Recent Labs     01/24/22  0255   *   K 5.1   CL 95*   CO2 28   BUN 11   CREA 0.42*   *   CA 9.4   MG 2.1     No results for input(s): ALT, AP, TBIL, TBILI, TP, ALB, GLOB, GGT, AML, LPSE in the last 72 hours. No lab exists for component: SGOT, GPT, AMYP, HLPSE  No results for input(s): INR, PTP, APTT, INREXT, INREXT in the last 72 hours. No results for input(s): FE, TIBC, PSAT, FERR in the last 72 hours. No results found for: FOL, RBCF   No results for input(s): PH, PCO2, PO2 in the last 72 hours. No results for input(s): CPK, CKNDX, TROIQ in the last 72 hours.     No lab exists for component: CPKMB  No results found for: CHOL, CHOLX, CHLST, CHOLV, HDL, HDLP, LDL, LDLC, DLDLP, TGLX, TRIGL, TRIGP, CHHD, CHHDX  No results found for: GLUCPOC  No results found for: COLOR, APPRN, SPGRU, REFSG, ROMERO, PROTU, GLUCU, KETU, BILU, UROU, SANAZ, LEUKU, GLUKE, EPSU, BACTU, WBCU, RBCU, CASTS, UCRY    Med list reviewed  Current Facility-Administered Medications   Medication Dose Route Frequency    enoxaparin (LOVENOX) injection 40 mg  40 mg SubCUTAneous Q24H    dexAMETHasone (DECADRON) tablet 1 mg  1 mg Oral DAILY    gabapentin (NEURONTIN) capsule 300 mg  300 mg Oral TID    cyclobenzaprine (FLEXERIL) tablet 5 mg  5 mg Oral BID    bisacodyL (DULCOLAX) suppository 10 mg  10 mg Rectal DAILY PRN    HYDROmorphone (PF) (DILAUDID) injection 2 mg  2 mg IntraVENous Q3H PRN    senna-docusate (PERICOLACE) 8.6-50 mg per tablet 1 Tablet  1 Tablet Oral DAILY    HYDROmorphone (DILAUDID) tablet 1 mg  1 mg Oral Q4H PRN    HYDROmorphone (DILAUDID) tablet 2 mg  2 mg Oral Q4H PRN    polyethylene glycol (MIRALAX) packet 17 g  17 g Oral DAILY    sodium chloride (NS) flush 5-40 mL  5-40 mL IntraVENous Q8H    sodium chloride (NS) flush 5-40 mL  5-40 mL IntraVENous PRN    acetaminophen (TYLENOL) tablet 650 mg  650 mg Oral Q6H PRN    Or    acetaminophen (TYLENOL) suppository 650 mg  650 mg Rectal Q6H PRN    ondansetron (ZOFRAN ODT) tablet 4 mg  4 mg Oral Q8H PRN    Or    ondansetron (ZOFRAN) injection 4 mg  4 mg IntraVENous Q6H PRN    metoprolol tartrate (LOPRESSOR) tablet 25 mg  25 mg Oral Q12H    fentaNYL (DURAGESIC) 50 mcg/hr patch 1 Patch  1 Patch TransDERmal Q72H    0.9% sodium chloride infusion 250 mL  250 mL IntraVENous PRN    sodium chloride (NS) flush 5-40 mL  5-40 mL IntraVENous Q8H    sodium chloride (NS) flush 5-40 mL  5-40 mL IntraVENous PRN    acetaminophen (TYLENOL) tablet 650 mg  650 mg Oral Q6H    naloxone (NARCAN) injection 0.4 mg  0.4 mg IntraVENous PRN    diphenhydrAMINE (BENADRYL) capsule 25 mg  25 mg Oral Q6H PRN    phenol throat spray (CHLORASEPTIC) 1 Spray  1 Spray Oral PRN    benzocaine-menthoL (CEPACOL) lozenge 1 Lozenge  1 Lozenge Oral PRN       Care Plan discussed with:  Patient/Family and Nurse    Ivet Guerrier MD  Internal Medicine  Date of Service: 1/24/2022

## 2022-01-24 NOTE — PROGRESS NOTES
Hematology-Oncology Progress Note      [facility]  1990  [id number]  1/24/2022      Subjective: Had difficulty with constipation over the weekend. Has some leg discomfort no c/o back pain    Allergies: Patient has no known allergies.   Current Facility-Administered Medications   Medication Dose Route Frequency Provider Last Rate Last Admin    enoxaparin (LOVENOX) injection 40 mg  40 mg SubCUTAneous Q24H Charlene Tafoya MD   40 mg at 01/24/22 1131    dexAMETHasone (DECADRON) tablet 1 mg  1 mg Oral DAILY EMILIA Orellana   1 mg at 01/24/22 0957    gabapentin (NEURONTIN) capsule 300 mg  300 mg Oral TID EMILIA Huizar   300 mg at 01/24/22 0957    cyclobenzaprine (FLEXERIL) tablet 5 mg  5 mg Oral BID EMILIA Huizar   5 mg at 01/24/22 9351    bisacodyL (DULCOLAX) suppository 10 mg  10 mg Rectal DAILY PRN Nadia Hanna MD   10 mg at 01/23/22 0827    HYDROmorphone (PF) (DILAUDID) injection 2 mg  2 mg IntraVENous Q3H PRN Nadia Hanna MD   2 mg at 01/24/22 0826    senna-docusate (PERICOLACE) 8.6-50 mg per tablet 1 Tablet  1 Tablet Oral DAILY Nadia Hanna MD   1 Tablet at 01/23/22 0829    HYDROmorphone (DILAUDID) tablet 1 mg  1 mg Oral Q4H PRN EMILIA Huizar   1 mg at 01/22/22 2149    HYDROmorphone (DILAUDID) tablet 2 mg  2 mg Oral Q4H PRN EMILIA Huizar   2 mg at 01/24/22 1131    polyethylene glycol (MIRALAX) packet 17 g  17 g Oral DAILY EMILIA Orellana   17 g at 01/23/22 3065    sodium chloride (NS) flush 5-40 mL  5-40 mL IntraVENous Q8H Mary Beth Pringle MD   5 mL at 01/24/22 0600    sodium chloride (NS) flush 5-40 mL  5-40 mL IntraVENous PRN Mary Beth Pringle MD        acetaminophen (TYLENOL) tablet 650 mg  650 mg Oral Q6H PRN Mary Beth Pringle MD   650 mg at 01/22/22 2147    Or    acetaminophen (TYLENOL) suppository 650 mg  650 mg Rectal Q6H PRN Mary Beth Pringle MD        ondansetron (ZOFRAN ODT) tablet 4 mg  4 mg Oral Q8H PRN Hayley Vizcarra, Vinnie Clayton MD        Or    ondansetron [facility]) injection 4 mg  4 mg IntraVENous Q6H PRN Soumya Dawkins MD        metoprolol tartrate (LOPRESSOR) tablet 25 mg  25 mg Oral Q12H Soumya Dawkins MD   25 mg at 01/24/22 0958    fentaNYL (DURAGESIC) 50 mcg/hr patch 1 Patch  1 Patch TransDERmal Q72H Soumya Dawkins MD   1 Patch at 01/23/22 0835    0.9% sodium chloride infusion 250 mL  250 mL IntraVENous PRN Nessa Sapp MD        sodium chloride (NS) flush 5-40 mL  5-40 mL IntraVENous Q8H Soumya Dawkins MD   10 mL at 01/24/22 0827    sodium chloride (NS) flush 5-40 mL  5-40 mL IntraVENous PRN Soumya Dawkins MD        acetaminophen (TYLENOL) tablet 650 mg  650 mg Oral Q6H Soumya Dawkins MD   650 mg at 01/24/22 8373    naloxone [facility]) injection 0.4 mg  0.4 mg IntraVENous PRN Soumya Dawkins MD        diphenhydrAMINE (BENADRYL) capsule 25 mg  25 mg Oral Q6H PRN Soumya Dawkins MD        phenol throat spray (CHLORASEPTIC) 1 Spray  1 [place] Oral PRN Soumya Dawkins MD        benzocaine-menthoL (CEPACOL) lozenge 1 Lozenge  1 Lozenge Oral PRN Soumya Dawkins MD   1 Lozenge at 01/20/22 0306     Objective:     Patient Vitals for the past 24 hrs:   BP Temp Pulse Resp SpO2 Weight   01/24/22 1019 (!) 139/98 97.8 °F (36.6 °C) (!) 129 12 97 % --   01/24/22 1000 -- -- (!) 127 -- -- --   01/24/22 0958 (!) 142/94 -- (!) 134 -- -- --   01/24/22 0555 123/84 98.4 °F (36.9 °C) (!) 112 17 97 % --   01/24/22 0552 -- -- (!) 112 -- -- --   01/24/22 0353 -- -- (!) 112 -- -- --   01/24/22 0158 124/85 98.6 °F (37 °C) (!) 121 19 96 % 97.7 kg (215 lb 6.2 oz)   01/24/22 0151 -- -- (!) 118 -- -- --   01/23/22 2157 (!) 121/90 98.8 °F (37.1 °C) (!) 135 21 95 % --   01/23/22 2154 -- -- (!) 133 -- -- --   01/23/22 1956 -- -- (!) 135 -- -- --   01/23/22 1809 (!) 143/96 99.1 °F (37.3 °C) (!) 110 16 98 % --   01/23/22 1800 -- -- (!) 128 -- -- --   01/23/22 1400 -- -- (!) 120 -- -- --   01/23/22 1349 (!) 147/96 99.1 °F (37.3 °C) (!) 105 16 99 % --       Gen: NAD  HEENT: PERRL, Sclerae anicteric  Cv: RRR without m/r/g  Pulm: CTA bilaterally  Abd: NABS, NTND, No HSM  Ext: No c/c/e    Available labs reviewed:  Labs:    Recent Results (from the past 24 hour(s))   CBC W/O DIFF    Collection Time: 01/24/22  2:55 AM   Result Value Ref Range    WBC 8.9 3.6 - 11.0 K/uL    RBC 3.53 (L) 3.80 - 5.20 M/uL    HGB 9.2 (L) 11.5 - 16.0 g/dL    HCT 28.9 (L) 35.0 - 47.0 %    MCV 81.9 80.0 - 99.0 FL    MCH 26.1 26.0 - 34.0 PG    MCHC 31.8 30.0 - 36.5 g/dL    RDW 13.9 11.5 - 14.5 %    PLATELET 609 248 - 255 K/uL    MPV 9.7 8.9 - 12.9 FL    NRBC 0.6 (H) 0  WBC    ABSOLUTE NRBC 0.05 (H) 0.00 - 5.18 K/uL   METABOLIC PANEL, BASIC    Collection Time: 01/24/22  2:55 AM   Result Value Ref Range    Sodium 130 (L) 136 - 145 mmol/L    Potassium 5.1 3.5 - 5.1 mmol/L    Chloride 95 (L) 97 - 108 mmol/L    CO2 28 21 - 32 mmol/L    Anion gap 7 5 - 15 mmol/L    Glucose 108 (H) 65 - 100 mg/dL    BUN 11 6 - 20 MG/DL    Creatinine 0.42 (L) 0.55 - 1.02 MG/DL    BUN/Creatinine ratio 26 (H) 12 - 20      GFR est AA >60 >60 ml/min/1.73m2    GFR est non-AA >60 >60 ml/min/1.73m2    Calcium 9.4 8.5 - 10.1 MG/DL   MAGNESIUM    Collection Time: 01/24/22  2:55 AM   Result Value Ref Range    Magnesium 2.1 1.6 - 2.4 mg/dL         Assessment and Plan     32 y/o woman with stage IV breast CA on presentation, initial biopsy ER+/Her-2 negative. 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 Treated with docetaxel/pertuzumab/trastuzuamb initially, then maintained on Lupron/[place]. Now with worsening bony disease and lumbar  laminectomy. 1.  . Subsequent therapy will depend on lumbar laminectomy pathology--if Her-2 positive, likely TDM1 vs trastuzumab deruxtecan. If Her-2 negative, may be a candidate for [facility]. Ultimately defer to Dr. Minnie Cabrera for final recs. Of course, no treatment in the post-op period.     Jim Santo MD

## 2022-01-24 NOTE — PROGRESS NOTES
Problem: Falls - Risk of  Goal: *Absence of Falls  Description: Document [place] Fall Risk and appropriate interventions in the flowsheet.   Outcome: Progressing Towards Goal  Note: Fall Risk Interventions:  Mobility Interventions: Communicate number of staff needed for ambulation/transfer,OT consult for ADLs,Patient to call before getting OOB,PT Consult for mobility concerns,PT Consult for assist device competence,Strengthening exercises (ROM-active/passive),Utilize gait belt for transfers/ambulation         Medication Interventions: Assess postural VS orthostatic hypotension,Patient to call before getting OOB,Teach patient to arise slowly    Elimination Interventions: Bed/chair exit alarm,Call light in reach,Patient to call for help with toileting needs,Stay With Me (per policy)              Problem: Pain  Goal: *Control of Pain  Outcome: Progressing Towards Goal

## 2022-01-24 NOTE — PROGRESS NOTES
Bedside shift change report given to Yao Quintero RN (oncoming nurse) by Kira Taylor RN (offgoing nurse). Report included the following information SBAR, ED Summary, Procedure Summary, Intake/Output, Med Rec Status and Cardiac Rhythm SINUS TACH.

## 2022-01-24 NOTE — PROGRESS NOTES
Bedside shift change report given to Uday RN (oncoming nurse) by Selam Blackwell RN (offgoing nurse). Report included the following information SBAR, Kardex, ED Summary, OR Summary, Procedure Summary, Intake/Output, MAR, Cardiac Rhythm NSR, Alarm Parameters  and Dual Neuro Assessment.

## 2022-01-24 NOTE — PROGRESS NOTES
Problem: Self Care Deficits Care Plan (Adult)  Goal: *Acute Goals and Plan of Care (Insert Text)  Description: FUNCTIONAL STATUS PRIOR TO ADMISSION: At baseline, patient was independent with ADLs, IADLs, and functional mobility/ ambulatory without AD. Was ambulatory with a RW for ~1 month PTA, then was unable to walk for a few days prior to admission. Endorses no falls. HOME SUPPORT: Patient resided with her 10 y/o son and did not require assistance at baseline. Is anticipating staying at her mother's house when she returns home. Occupational Therapy Goals  Initiated 1/20/2022  1. Patient will perform upper body dressing with supervision/set-up within 7 day(s). 2.  Patient will perform lower body dressing using AE with moderate assistance  within 7 day(s). 3.  Patient will perform sliding board transfer to [facility] with moderate assistance  within 7 day(s). 4.  Patient will perform all aspects of toileting with moderate assistance  within 7 day(s). 5.  Patient will participate in upper extremity therapeutic exercise/activities with supervision/set-up for 10 minutes within 7 day(s). 6.  Patient will don/doff back brace at supervision/set-up within 7 days. 7.  Patient will verbalize/demonstrate 3/3 back precautions during ADL tasks without cues within 7 days. Outcome: Progressing Towards Goal     OCCUPATIONAL THERAPY TREATMENT  Patient: Meli Miles (8 y.o. female)  Date: 1/24/2022  Diagnosis: Cord compression syndrome (HCC) [G95.20] <principal problem not specified>  Procedure(s) (LRB):  T11 - L2 LAMINECTOMY AND FUSION WITH O-ARM (N/A) 5 Days Post-Op  Precautions: Fall,Back  Chart, occupational therapy assessment, plan of care, and goals were reviewed. ASSESSMENT  Patient continues with skilled OT services and is progressing towards goals. Patient remains limited by BLE weakness (improving), impaired functional reach for LB ADLs, and impaired sitting/ standing balance.    Patient with improved pain control today resulting in improved activity tolerance and functional performance. Progressed to full stand/ pivot to return to bed from chair with maximum assistance x2 using RW. Patient was instructed to keep center of gravity more forward and successfully completed transfer without her feet sliding forward. Since patient's mobility is progressing, OT sessions can now differentiate more to ADLs. She was receptive to education on [facility] transfer for bowel movements. She had a BM earlier today and reports she probably has voluntary anal contraction, but was unsure. Was receptive to further education on spinal precautions/ ADL modifications/ role of OT. Patient was able to tighten TLSO straps today but still required maximum assistance to don/ doff. Patient is significantly below functional baseline, participates well in therapy sessions, and is motivated to progress. She is an excellent candidate for inpatient rehab at /. Current Level of Function Impacting Discharge (ADLs/self-care): Required maximum assistance x2 for sit-stand/ pivot using RW and maximum assistance x2 for sit- supine<>sit. Infer overall patient requires set-up to moderate assistance UB ADLs and total assistance LB ADLs         PLAN :  Patient continues to benefit from skilled intervention to address the above impairments. Continue treatment per established plan of care to address goals. Recommendation for discharge: (in order for the patient to meet his/her long term goals)  Therapy 3 hours per day 5-7 days per week    This discharge recommendation:  Has been made in collaboration with the attending provider and/or case management         SUBJECTIVE:   Patient stated My pain isn't as bad today.     OBJECTIVE DATA SUMMARY:       Functional Mobility and Transfers for ADLs:  Bed Mobility:  Rolling: Maximum assistance  Supine to Sit: Maximum assistance;Assist x1  Scooting: Maximum assistance;Assist x1    Transfers:  Sit to Stand: Maximum assistance;Assist x2     Bed to Chair: Maximum assistance;Assist x2;Adaptive equipment (RW)    Balance:  Sitting: Impaired  Sitting - Static: Good (unsupported)  Sitting - Dynamic: Fair (occasional)  Standing: Impaired  Standing - Static: Poor;Constant support  Standing - Dynamic : Poor;Constant support    Pain:  Patient reported surgical back pain, although less limiting as prior sessions    Activity Tolerance:   Good    After treatment patient left in no apparent distress:   Supine in bed, Call bell within reach, and Caregiver / family present    COMMUNICATION/COLLABORATION:   The patients plan of care was discussed with: Physical therapist and Registered nurse.      Alyssa Stokes OT  Time Calculation: 24 mins

## 2022-01-24 NOTE — PROGRESS NOTES
Spine Surgery Progress Note  [facility]on [facility]ander[facility], [facility]    Admit Date: 2022   LOS: 5 days      Daily Progress Note: 2022    POD: 5 Day Post-Op    S/P: Procedure(s):  T11 - L2 LAMINECTOMY AND FUSION WITH O-ARM    Subjective:     Ms. Savannah Andrew is a very pleasant 78-year-old female with a known history of metastatic breast cancer. PET scan in the end of December showed disease in the spine and liver. Pt has had two visits to the emergency room on  and  for low back pain and urinary retention. For the last two days, she has had inability to walk and an inability to move her legs. Sensation is intact however, she only has minimal movement of her toes, no movement in her legs. Patient was transferred here from [facility] to [facility] for MRI. Imagine revealed \"Extensive bony metastatic disease with diffuse areas of epidural spread of  tumor. Most significant from T10 through L2 with severe cord compression at L1 and superior aspect of L2 and associated pathologic fracture of L1. Soft tissue extension of tumor into the right paraspinous muscles at L1 with patchy bilateral muscular enhancement. \"    Patient underwent T11-L3 laminectomy and fusion surgery with resection of metastatic tumor on 22. She tolerated the procedure well. Pain is better controlled. She had a BM last night and today. Some muscle contractions in quads. Aguilera still in place. Will need voiding trial.    She denies headache, chest pain, nausea, vomiting, and dyspnea. Objective:     Vital signs  Temp (24hrs), Av.7 °F (37.1 °C), Min:97.8 °F (36.6 °C), Max:99.7 °F (37.6 °C)   No intake/output data recorded.    1901 -  0700  In: -   Out: 2450 [Urine:2450]    Visit Vitals  /79   Pulse (!) 108   Temp 99.7 °F (37.6 °C)   Resp 16   Ht 5' 2\" (1.575 m)   Wt 97.7 kg (215 lb 6.2 oz)   SpO2 98%   BMI 39.40 kg/m²      O2 Device: None (Room air)       Voiding status: aguilera  Output (mL)  Last Bowel Movement Date: 01/24/22 (01/24/22 0400)     Pain control  Pain Assessment  Pain Scale 1: Numeric (0 - 10)  Pain Intensity 1: 5  Pain Onset 1: post op  Pain Location 1: Back  Pain Orientation 1: Mid,Lower  Pain Description 1: Aching  Pain Intervention(s) 1: Medication (see MAR)    PT/OT  Gait              Physical Exam:  Gen: No acute distress. Neuro: A&Ox3. Follows commands. Speech clear. Affect normal.  PERRL. EOMI. Face symmetric. Strength 5/5 in BUE. BLE hip flexors 0/5, knee extension is 1/5, ankle dorsi/plantar flexion 2/5. Wiggles toes bilaterally  Sensation intact. Gait deferred. Calves soft and supple; No pain with passive stretch  Skin: Incision C/D/I with staples in place.  No erythema, edema, or drainage      24 hour results:    Recent Results (from the past 24 hour(s))   CBC W/O DIFF    Collection Time: 01/24/22  2:55 AM   Result Value Ref Range    WBC 8.9 3.6 - 11.0 K/uL    RBC 3.53 (L) 3.80 - 5.20 M/uL    HGB 9.2 (L) 11.5 - 16.0 g/dL    HCT 28.9 (L) 35.0 - 47.0 %    MCV 81.9 80.0 - 99.0 FL    MCH 26.1 26.0 - 34.0 PG    MCHC 31.8 30.0 - 36.5 g/dL    RDW 13.9 11.5 - 14.5 %    PLATELET 061 373 - 549 K/uL    MPV 9.7 8.9 - 12.9 FL    NRBC 0.6 (H) 0  WBC    ABSOLUTE NRBC 0.05 (H) 0.00 - 1.55 K/uL   METABOLIC PANEL, BASIC    Collection Time: 01/24/22  2:55 AM   Result Value Ref Range    Sodium 130 (L) 136 - 145 mmol/L    Potassium 5.1 3.5 - 5.1 mmol/L    Chloride 95 (L) 97 - 108 mmol/L    CO2 28 21 - 32 mmol/L    Anion gap 7 5 - 15 mmol/L    Glucose 108 (H) 65 - 100 mg/dL    BUN 11 6 - 20 MG/DL    Creatinine 0.42 (L) 0.55 - 1.02 MG/DL    BUN/Creatinine ratio 26 (H) 12 - 20      GFR est AA >60 >60 ml/min/1.73m2    GFR est non-AA >60 >60 ml/min/1.73m2    Calcium 9.4 8.5 - 10.1 MG/DL   MAGNESIUM    Collection Time: 01/24/22  2:55 AM   Result Value Ref Range    Magnesium 2.1 1.6 - 2.4 mg/dL        Assessment:     Active Problems:    Cord compression syndrome (HCC) (1/19/2022)      Plan:     1) s/p T11-L3 laminectomy/fusion w resection of epidural spinal metastatic breast cancer  -PT/OT - in TLSO (consult for custom TLSO placed)    -Pain control - scheduled tylenol, fentanyl patch, gabapentin, prn dilaudid   -decadron taper completed 01/24   -Regular diet   -Voiding trial in am. May unless going to inpt rehab tomorrow then can do voiding trial at rehab  2) Breast CA   -Dr. Lourdes Nuñez is primary oncologist   -Per oncology, further systemic tx will be dependent on tissue pathology   -Path pending   -Will need radiation 4-6 weeks after surgery  3) Hx of SVT   -Cont metoprolol    Plan d/w Dr. Hayley Vizcarra, nurse. Awaiting insurance authorization for inpatient rehab.     Susana Putnam NP

## 2022-01-25 PROCEDURE — 74011250637 HC RX REV CODE- 250/637: Performed by: SPECIALIST

## 2022-01-25 PROCEDURE — 74011000250 HC RX REV CODE- 250: Performed by: SPECIALIST

## 2022-01-25 PROCEDURE — 74011250636 HC RX REV CODE- 250/636: Performed by: NURSE PRACTITIONER

## 2022-01-25 PROCEDURE — 65660000000 HC RM CCU STEPDOWN

## 2022-01-25 PROCEDURE — 97530 THERAPEUTIC ACTIVITIES: CPT

## 2022-01-25 PROCEDURE — 74011250637 HC RX REV CODE- 250/637: Performed by: HOSPITALIST

## 2022-01-25 PROCEDURE — 74011250637 HC RX REV CODE- 250/637: Performed by: PHYSICIAN ASSISTANT

## 2022-01-25 PROCEDURE — 74011250637 HC RX REV CODE- 250/637: Performed by: STUDENT IN AN ORGANIZED HEALTH CARE EDUCATION/TRAINING PROGRAM

## 2022-01-25 PROCEDURE — 74011250636 HC RX REV CODE- 250/636: Performed by: NEUROLOGICAL SURGERY

## 2022-01-25 PROCEDURE — 74011000250 HC RX REV CODE- 250: Performed by: STUDENT IN AN ORGANIZED HEALTH CARE EDUCATION/TRAINING PROGRAM

## 2022-01-25 PROCEDURE — 74011250636 HC RX REV CODE- 250/636: Performed by: HOSPITALIST

## 2022-01-25 RX ORDER — HYDROMORPHONE HYDROCHLORIDE 1 MG/ML
1 INJECTION, SOLUTION INTRAMUSCULAR; INTRAVENOUS; SUBCUTANEOUS ONCE
Status: COMPLETED | OUTPATIENT
Start: 2022-01-25 | End: 2022-01-25

## 2022-01-25 RX ADMIN — HYDROMORPHONE HYDROCHLORIDE 2 MG: 2 TABLET ORAL at 09:38

## 2022-01-25 RX ADMIN — METOPROLOL TARTRATE 25 MG: 25 TABLET, FILM COATED ORAL at 09:38

## 2022-01-25 RX ADMIN — SENNOSIDES AND DOCUSATE SODIUM 1 TABLET: 50; 8.6 TABLET ORAL at 09:38

## 2022-01-25 RX ADMIN — POLYETHYLENE GLYCOL 3350 17 G: 17 POWDER, FOR SOLUTION ORAL at 09:37

## 2022-01-25 RX ADMIN — METOPROLOL TARTRATE 25 MG: 25 TABLET, FILM COATED ORAL at 22:55

## 2022-01-25 RX ADMIN — HYDROMORPHONE HYDROCHLORIDE 2 MG: 2 TABLET ORAL at 14:26

## 2022-01-25 RX ADMIN — SODIUM CHLORIDE, PRESERVATIVE FREE 10 ML: 5 INJECTION INTRAVENOUS at 22:59

## 2022-01-25 RX ADMIN — GABAPENTIN 300 MG: 300 CAPSULE ORAL at 16:22

## 2022-01-25 RX ADMIN — SODIUM CHLORIDE, PRESERVATIVE FREE 10 ML: 5 INJECTION INTRAVENOUS at 22:58

## 2022-01-25 RX ADMIN — CYCLOBENZAPRINE 5 MG: 10 TABLET, FILM COATED ORAL at 09:38

## 2022-01-25 RX ADMIN — HYDROMORPHONE HYDROCHLORIDE 1 MG: 1 INJECTION, SOLUTION INTRAMUSCULAR; INTRAVENOUS; SUBCUTANEOUS at 01:20

## 2022-01-25 RX ADMIN — HYDROMORPHONE HYDROCHLORIDE 1 MG: 1 INJECTION, SOLUTION INTRAMUSCULAR; INTRAVENOUS; SUBCUTANEOUS at 19:10

## 2022-01-25 RX ADMIN — GABAPENTIN 300 MG: 300 CAPSULE ORAL at 09:37

## 2022-01-25 RX ADMIN — ACETAMINOPHEN 650 MG: 325 TABLET ORAL at 10:23

## 2022-01-25 RX ADMIN — ACETAMINOPHEN 650 MG: 325 TABLET ORAL at 02:06

## 2022-01-25 RX ADMIN — HYDROMORPHONE HYDROCHLORIDE 2 MG: 2 TABLET ORAL at 18:30

## 2022-01-25 RX ADMIN — SODIUM CHLORIDE, PRESERVATIVE FREE 10 ML: 5 INJECTION INTRAVENOUS at 06:58

## 2022-01-25 RX ADMIN — ACETAMINOPHEN 650 MG: 325 TABLET ORAL at 16:22

## 2022-01-25 RX ADMIN — HYDROMORPHONE HYDROCHLORIDE 2 MG: 2 TABLET ORAL at 22:55

## 2022-01-25 RX ADMIN — GABAPENTIN 300 MG: 300 CAPSULE ORAL at 22:54

## 2022-01-25 RX ADMIN — ENOXAPARIN SODIUM 40 MG: 100 INJECTION SUBCUTANEOUS at 10:24

## 2022-01-25 RX ADMIN — CYCLOBENZAPRINE 5 MG: 10 TABLET, FILM COATED ORAL at 17:11

## 2022-01-25 NOTE — PROGRESS NOTES
Bedside shift change report given to Kieran Phan RN (oncoming nurse) by JULIAN Caceres (offgoing nurse). Report included the following information SBAR, Cardiac Rhythm Sinus Tachy and Dual Neuro Assessment.

## 2022-01-25 NOTE — PROGRESS NOTES
[facility] pending insurance auth. CM spoke with Lucrecia López, [phone]), liason for [facility] to get an update on this pt's insurance auth. She stated that they have not gotten auth yet. She said that this pt's policy is an \"out of state\" policy and it typically takes 5-7 days to get [facility] these [facility].  Jean Marie Min

## 2022-01-25 NOTE — PROGRESS NOTES
Problem: Mobility Impaired (Adult and Pediatric)  Goal: *Acute Goals and Plan of Care (Insert Text)  Description: FUNCTIONAL STATUS PRIOR TO ADMISSION: Patient was independent and active without use of DME. She has history of breast cancer with mets to the spine. HOME SUPPORT PRIOR TO ADMISSION: Pt lives with her 9year old son. Her mother is also involved in her care. Physical Therapy Goals    Reassessed on 1/24/2022  1. Patient will move from supine to sit and sit to supine , scoot up and down, and roll side to side in bed with moderate assistance  within 7 days. 2. Patient will perform sit to stand with maximal assistance within 7 days. 3. Patient will ambulate with maximal assistance for 5 feet with the least restrictive device within 7 days. 4. Patient will verbalize and demonstrate understanding of spinal precautions (No bending, lifting greater than 5 lbs, or twisting; log-roll technique; frequent repositioning as instructed) within 7 days. Initiated 1/20/2022  1. Patient will move from supine to sit and sit to supine , scoot up and down, and roll side to side in bed with moderate assistance  within 4 days. 2. Patient will perform sit to stand with maximal assistance within 4 days. 3. Patient will ambulate with maximal assistance for 5 feet with the least restrictive device within 4 days. 4. Patient will verbalize and demonstrate understanding of spinal precautions (No bending, lifting greater than 5 lbs, or twisting; log-roll technique; frequent repositioning as instructed) within 4 days.         Outcome: Progressing Towards Goal   PHYSICAL THERAPY TREATMENT  Patient: Qing Garrison (53 y.o. female)  Date: 1/25/2022  Diagnosis: Cord compression syndrome (HCC) [G95.20] <principal problem not specified>  Procedure(s) (LRB):  T11 - L2 LAMINECTOMY AND FUSION WITH O-ARM (N/A) 6 Days Post-Op  Precautions: Fall,Back, TLSO OOB  Chart, physical therapy assessment, plan of care and goals were reviewed. ASSESSMENT  Patient continues with skilled PT services and is slowly progressing towards goals. Pt presents with pain (back), impaired balance, decreased strength, tachycardia, and decreased activity tolerance. Able to verbalized spinal precautions and adhere to them during mobility. Pt required maxA for supine >sit transfer utilizing log roll. Demonstrated ability to assist with donning TLSO brace. Required maxA x2 for sit<> stand with RW and transferred to chair. HR increased to 176 bpm with mobility, but decreased with seated rest.Pt left in chair with all needs met and TLSO donned. Pt is motivated to improve and work with therapy. Recommending IPR upon discharge. Current Level of Function Impacting Discharge (mobility/balance): maxA x2 for transfers     Other factors to consider for discharge: supportive family, motivated, independent baseline, pain (back)         PLAN :  Patient continues to benefit from skilled intervention to address the above impairments. Continue treatment per established plan of care. to address goals. Recommendation for discharge: (in order for the patient to meet his/her long term goals)  Therapy 3 hours per day 5-7 days per week    This discharge recommendation:  Has not yet been discussed the attending provider and/or case management    IF patient discharges home will need the following DME: to be determined (TBD)       SUBJECTIVE:   Patient stated I am going to get through this.     OBJECTIVE DATA SUMMARY:   Critical Behavior:  Neurologic State: Alert,Eyes open spontaneously  Orientation Level: Oriented X4  Cognition: Appropriate decision making,Appropriate for age attention/concentration,Appropriate safety awareness,Follows commands     Functional Mobility Training:  Bed Mobility:  Rolling: Maximum assistance  Supine to Sit: Maximum assistance     Scooting:  Moderate assistance        Transfers:  Sit to Stand: Maximum assistance;Assist x2  Stand to Sit: Maximum assistance;Assist x2        Bed to Chair: Maximum assistance;Assist x2                    Balance:  Sitting: Impaired  Sitting - Static: Good (unsupported)  Sitting - Dynamic: Fair (occasional)  Standing: Impaired; With support  Standing - Static: Poor;Constant support  Standing - Dynamic : Poor;Constant support      Activity Tolerance:   Fair    After treatment patient left in no apparent distress:   Sitting in chair and Call bell within reach    COMMUNICATION/COLLABORATION:   The patients plan of care was discussed with: Registered nurse. Barney Short, [facility]   Time Calculation: 26 mins        Regarding student involvement in patient care:  A student participated in this treatment session. Per CMS Medicare statements and APTA guidelines I certify that the following was true:  1. I was present and directly observed the entire session. 2. I made all skilled judgments and clinical decisions regarding care. 3. I am the practitioner responsible for assessment, treatment, and documentation.

## 2022-01-25 NOTE — PROGRESS NOTES
Spine Surgery Progress Note  Barbara Napoles PA-C    Admit Date: 2022   LOS: 6 days      Daily Progress Note: 2022    POD: 6 Day Post-Op    S/P: Procedure(s):  T11 - L2 LAMINECTOMY AND FUSION WITH O-ARM    Subjective:     Ms. Nathaniel De L aCruz is a very pleasant 26-year-old female with a known history of metastatic breast cancer. PET scan in the end of December showed disease in the spine and liver. Pt has had two visits to the emergency room on  and  for low back pain and urinary retention. For the last two days, she has had inability to walk and an inability to move her legs. Sensation is intact however, she only has minimal movement of her toes, no movement in her legs. Patient was transferred here from [facility] to [facility] for MRI. Imagine revealed \"Extensive bony metastatic disease with diffuse areas of epidural spread of  tumor. Most significant from T10 through L2 with severe cord compression at L1 and superior aspect of L2 and associated pathologic fracture of L1. Soft tissue extension of tumor into the right paraspinous muscles at L1 with patchy bilateral muscular enhancement. \"    Patient underwent T11-L3 laminectomy and fusion surgery with resection of metastatic tumor on 22. She tolerated the procedure well. Pain is well-controlled today though patient states she had a rough night with sharp shooting pain. Some muscle contractions in quads. Voiding trial today. She denies headache, chest pain, nausea, vomiting, and dyspnea. Objective:     Vital signs  Temp (24hrs), Av.3 °F (36.8 °C), Min:98 °F (36.7 °C), Max:99.7 °F (37.6 °C)   No intake/output data recorded.    1901 -  0700  In: 240 [P.O.:240]  Out: 2250 [Urine:2250]    Visit Vitals  /86 (BP 1 Location: Right upper arm, BP Patient Position: Sitting)   Pulse (!) 112   Temp 98.3 °F (36.8 °C)   Resp 15   Ht 5' 2\" (1.575 m)   Wt 97.7 kg (215 lb 6.2 oz)   SpO2 98%   BMI 39.40 kg/m²      O2 Device: None (Room air)       Voiding status: aguilera  Output (mL)  Last Bowel Movement Date: 01/24/22 (01/24/22 0800)     Pain control  Pain Assessment  Pain Scale 1: Numeric (0 - 10)  Pain Intensity 1: 4  Pain Onset 1: post op  Pain Location 1: Back  Pain Orientation 1: Mid,Lower  Pain Description 1: Aching  Pain Intervention(s) 1: Medication (see MAR)    PT/OT  Gait              Physical Exam:  Gen: No acute distress. Neuro: A&Ox3. Follows commands. Speech clear. Affect normal.  PERRL. EOMI. Face symmetric. Strength 5/5 in BUE. BLE hip flexors 0/5, knee extension is 1/5, ankle dorsi/plantar flexion 2/5. Wiggles toes bilaterally  Sensation intact. Gait deferred. Calves soft and supple; No pain with passive stretch  Skin: Incision C/D/I with staples in place. No erythema, edema, or drainage      24 hour results:    No results found for this or any previous visit (from the past 24 hour(s)). Assessment:     Active Problems:    Cord compression syndrome ([address] (1/19/2022)      Plan:     1) s/p T11-L3 laminectomy/fusion w resection of epidural spinal metastatic breast cancer  -PT/OT - in TLSO (consult for custom TLSO placed)    -Pain control - scheduled tylenol, fentanyl patch, gabapentin, prn dilaudid   -decadron taper completed 01/24   -Regular diet   -Voiding trial today; replace aguilera if unable to void. Pt may have to discharge with aguilera and continue voiding trials in IPR  2) Breast CA   -Dr. Selam Rodrigez is primary oncologist   -Per oncology, further systemic tx will be dependent on tissue pathology   -Path pending   -Will need radiation 4-6 weeks after surgery  3) Hx of SVT   -Cont metoprolol    Plan d/w Dr. Ira Dasilva, nurse. Awaiting insurance authorization for inpatient rehab.     EMILIA Salazar

## 2022-01-25 NOTE — PROGRESS NOTES
Hospitalist Progress Note      Hospital summary: 33 y.o lady w/ metastatic breast cancer who presented as a transfer from [facility] for management of cord compression from spine mets. Assessment/Plan:  Cord compression in the setting of metastatic breast cancer:  -s/p T11-L3 laminectomy on 1/19  -dexamethasone - completed course  -PT/OT as able  -neurosurgery and oncology consulted  -f/u pathology    Bilateral leg pain: some of this seems to be neuropathic  -continue fentanyl patch, PRN Dilaudid; IV stopped   -continue gabapenting  -continue bowel regimen    Sinus tachycardia: asymptomatic; monitor. History of SVT on metoprolol. Code status: full  DVT prophylaxis: sq Lovenox - continue until 2/2/22 per oncology  Disposition: rehab when medically appropriate  ----------------------------------------------    CC: bilateral leg pain    S: pain controlled now though required a dose of IV Dilaudid last night    Review of Systems:  Pertinent items are noted in HPI. O:  Visit Vitals  /83 (BP 1 Location: Right arm, BP Patient Position: At rest)   Pulse (!) 102   Temp 99.2 °F (37.3 °C)   Resp 12   Ht 5' 2\" (1.575 m)   Wt 97.7 kg (215 lb 6.2 oz)   SpO2 98%   BMI 39.40 kg/m²       PHYSICAL EXAM:  Gen: NAD, non-toxic, obese  HEENT: anicteric sclerae, normal conjunctiva  Neck: supple, trachea midline, no adenopathy  Heart: RRR, no MRG, no JVD, no peripheral edema  Lungs: CTA b/l, non-labored respirations  Abd: soft, NT, ND, BS+  Extr: warm  Skin: dry, no rash  Neuro: CN II-XII grossly intact, normal speech, minimal b/l LE movement 2/5 (unchanged)  Psych: normal mood, appropriate affect      Intake/Output Summary (Last 24 hours) at 1/25/2022 1653  Last data filed at 1/25/2022 0800  Gross per 24 hour   Intake --   Output 1650 ml   Net -1650 ml        Recent labs & imaging reviewed:  No results found for this or any previous visit (from the past 24 hour(s)).   Recent Labs 01/24/22  0255   WBC 8.9   HGB 9.2*   HCT 28.9*        Recent Labs     01/24/22  0255   *   K 5.1   CL 95*   CO2 28   BUN 11   CREA 0.42*   *   CA 9.4   MG 2.1     No results for input(s): ALT, AP, TBIL, TBILI, TP, ALB, GLOB, GGT, AML, LPSE in the last 72 hours. No lab exists for component: SGOT, GPT, AMYP, HLPSE  No results for input(s): INR, PTP, APTT, INREXT, INREXT in the last 72 hours. No results for input(s): FE, TIBC, PSAT, FERR in the last 72 hours. No results found for: FOL, RBCF   No results for input(s): PH, PCO2, PO2 in the last 72 hours. No results for input(s): CPK, CKNDX, TROIQ in the last 72 hours.     No lab exists for component: CPKMB  No results found for: CHOL, CHOLX, CHLST, CHOLV, HDL, HDLP, LDL, LDLC, DLDLP, TGLX, TRIGL, TRIGP, CHHD, CHHDX  No results found for: GLUCPOC  No results found for: COLOR, APPRN, SPGRU, REFSG, ROMERO, PROTU, GLUCU, KETU, BILU, UROU, SANAZ, LEUKU, GLUKE, EPSU, BACTU, WBCU, RBCU, CASTS, UCRY    Med list reviewed  Current Facility-Administered Medications   Medication Dose Route Frequency    enoxaparin (LOVENOX) injection 40 mg  40 mg SubCUTAneous Q24H    gabapentin (NEURONTIN) capsule 300 mg  300 mg Oral TID    cyclobenzaprine (FLEXERIL) tablet 5 mg  5 mg Oral BID    bisacodyL (DULCOLAX) suppository 10 mg  10 mg Rectal DAILY PRN    senna-docusate (PERICOLACE) 8.6-50 mg per tablet 1 Tablet  1 Tablet Oral DAILY    HYDROmorphone (DILAUDID) tablet 1 mg  1 mg Oral Q4H PRN    HYDROmorphone (DILAUDID) tablet 2 mg  2 mg Oral Q4H PRN    polyethylene glycol (MIRALAX) packet 17 g  17 g Oral DAILY    sodium chloride (NS) flush 5-40 mL  5-40 mL IntraVENous Q8H    sodium chloride (NS) flush 5-40 mL  5-40 mL IntraVENous PRN    acetaminophen (TYLENOL) tablet 650 mg  650 mg Oral Q6H PRN    Or    acetaminophen (TYLENOL) suppository 650 mg  650 mg Rectal Q6H PRN    ondansetron (ZOFRAN ODT) tablet 4 mg  4 mg Oral Q8H PRN    Or    ondansetron (ZOFRAN) injection 4 mg  4 mg IntraVENous Q6H PRN    metoprolol tartrate (LOPRESSOR) tablet 25 mg  25 mg Oral Q12H    fentaNYL (DURAGESIC) 50 mcg/hr patch 1 Patch  1 Patch TransDERmal Q72H    0.9% sodium chloride infusion 250 mL  250 mL IntraVENous PRN    sodium chloride (NS) flush 5-40 mL  5-40 mL IntraVENous Q8H    sodium chloride (NS) flush 5-40 mL  5-40 mL IntraVENous PRN    acetaminophen (TYLENOL) tablet 650 mg  650 mg Oral Q6H    naloxone (NARCAN) injection 0.4 mg  0.4 mg IntraVENous PRN    diphenhydrAMINE (BENADRYL) capsule 25 mg  25 mg Oral Q6H PRN    phenol throat spray (CHLORASEPTIC) 1 Spray  1 Spray Oral PRN    benzocaine-menthoL (CEPACOL) lozenge 1 Lozenge  1 Lozenge Oral PRN       Care Plan discussed with:  Patient/Family and Nurse    Clemencia Robb MD  Internal Medicine  Date of Service: 1/25/2022

## 2022-01-25 NOTE — PROGRESS NOTES
Problem: Self Care Deficits Care Plan (Adult)  Goal: *Acute Goals and Plan of Care (Insert Text)  Description: FUNCTIONAL STATUS PRIOR TO ADMISSION: At baseline, patient was independent with ADLs, IADLs, and functional mobility/ ambulatory without AD. Was ambulatory with a RW for ~1 month PTA, then was unable to walk for a few days prior to admission. Endorses no falls. HOME SUPPORT: Patient resided with her 10 y/o son and did not require assistance at baseline. Is anticipating staying at her mother's house when she returns home. Occupational Therapy Goals  Initiated 1/20/2022  1. Patient will perform upper body dressing with supervision/set-up within 7 day(s). 2.  Patient will perform lower body dressing using AE with moderate assistance  within 7 day(s). 3.  Patient will perform sliding board transfer to [facility] with moderate assistance  within 7 day(s). 4.  Patient will perform all aspects of toileting with moderate assistance  within 7 day(s). 5.  Patient will participate in upper extremity therapeutic exercise/activities with supervision/set-up for 10 minutes within 7 day(s). 6.  Patient will don/doff back brace at supervision/set-up within 7 days. 7.  Patient will verbalize/demonstrate 3/3 back precautions during ADL tasks without cues within 7 days. Outcome: Progressing Towards Goal    OCCUPATIONAL THERAPY TREATMENT  Patient: Roscoe Melton (59 y.o. female)  Date: 1/25/2022  Diagnosis: Cord compression syndrome ([facility] [address] [G95.20] <principal problem not specified>  Procedure(s) (LRB):  T11 - L2 LAMINECTOMY AND FUSION WITH O-ARM (N/A) 6 Days Post-Op  Precautions: Fall,Back  Chart, occupational therapy assessment, plan of care, and goals were reviewed. ASSESSMENT  Patient continues with skilled OT services and is progressing towards goals.   Patient remains limited by BLE weakness (improving), tachycardia with activity (up to 170s with stand-pivot), impaired functional reach for LB ADLs, and impaired sitting/ standing balance. Patient significantly progressed mobility performance this session. So far patient has only mobilized OOB to chair 1x/ day but today progressed to 2x. Also required less assistance for bed mobility and transfer (see below). Patient still requires maximum assistance to don/ doff TLSO but was able to secure 3 velcro straps. Patient remains significantly below functional baseline, participates well in therapy sessions, and is motivated to progress. She is an excellent candidate for inpatient rehab at /. Current Level of Function Impacting Discharge (ADLs/self-care): Required only minimum assistance for rolling, moderate assistance sidelying to sit, and moderate assistance x2 for sit-stand/ pivot to chair using RW (steadying + help weightshifting for each step). Infer overall patient requires set-up to moderate assistance UB ADLs and total assistance LB ADLs         PLAN :  Patient continues to benefit from skilled intervention to address the above impairments. Continue treatment per established plan of care to address goals. Recommendation for discharge: (in order for the patient to meet his/her long term goals)  Therapy 3 hours per day 5-7 days per week    This discharge recommendation:  Has been made in collaboration with the attending provider and/or case management       SUBJECTIVE:   Patient stated I can do it.     OBJECTIVE DATA SUMMARY:   Cognitive/Behavioral Status:  Neurologic State: Alert  Orientation Level: Oriented X4  Cognition: Appropriate decision making; Appropriate for age attention/concentration; Appropriate safety awareness; Follows commands  Perception: Appears intact          Functional Mobility and Transfers for ADLs:  Bed Mobility:  Rolling: Minimum assistance (to L side, using rail)  Supine to Sit: Moderate assistance  Scooting: Moderate assistance    Transfers:  Sit to Stand:  Moderate assistance;Assist x2     Bed to Chair: Moderate assistance;Assist x2 (using RW)    Balance:  Sitting: Impaired  Sitting - Static: Good (unsupported)  Sitting - Dynamic: Fair (occasional)  Standing: Impaired; With support  Standing - Static: Poor;Constant support  Standing - Dynamic : Poor;Constant support    Pain:  Patient reported back pain with activity, improving with rest/ repositioning     Activity Tolerance:   Good    After treatment patient left in no apparent distress:   Sitting in chair, Call bell within reach, and Caregiver / family present    COMMUNICATION/COLLABORATION:   The patients plan of care was discussed with: Physical therapist and Registered nurse.      Ivan Muhammad OT  Time Calculation: 12 mins

## 2022-01-25 NOTE — PROGRESS NOTES
Hematology-Oncology Progress Note      Rashard Lim  1990  [id number]  1/25/2022      Subjective:     Moving toes. Able to transfer from bed to chair with help yesterday. Allergies: Patient has no known allergies.   Current Facility-Administered Medications   Medication Dose Route Frequency Provider Last Rate Last Admin    enoxaparin (LOVENOX) injection 40 mg  40 mg SubCUTAneous Q24H Casie Polo MD   40 mg at 01/24/22 1131    gabapentin (NEURONTIN) capsule 300 mg  300 mg Oral TID EMILIA Witt   300 mg at 01/24/22 2205    cyclobenzaprine (FLEXERIL) tablet 5 mg  5 mg Oral BID EMILIA Witt   5 mg at 01/24/22 1824    bisacodyL (DULCOLAX) suppository 10 mg  10 mg Rectal DAILY PRN Tali Watson MD   10 mg at 01/23/22 0827    senna-docusate ([place] [employer]) 8.6-50 mg per tablet 1 Tablet  1 Tablet Oral DAILY Tali Watson MD   1 Tablet at 01/23/22 0829    HYDROmorphone (DILAUDID) tablet 1 mg  1 mg Oral Q4H PRN Michelle PLAZA PA   1 mg at 01/22/22 2149    HYDROmorphone (DILAUDID) tablet 2 mg  2 mg Oral Q4H PRN Michelle PLAZA PA   2 mg at 01/24/22 2324    polyethylene glycol (MIRALAX) packet 17 g  17 g Oral DAILY Angela Orellana PA   17 g at 01/23/22 7140    sodium chloride (NS) flush 5-40 mL  5-40 mL IntraVENous Q8H Mary Beth Pringle MD   10 mL at 01/25/22 0658    sodium chloride (NS) flush 5-40 mL  5-40 mL IntraVENous PRN Mary Beth Pringle MD        acetaminophen (TYLENOL) tablet 650 mg  650 mg Oral Q6H PRN Mary Beth Pringle MD   650 mg at 01/22/22 2147    Or    acetaminophen (TYLENOL) suppository 650 mg  650 mg Rectal Q6H PRN Mary Beth Pringle MD        ondansetron (ZOFRAN ODT) tablet 4 mg  4 mg Oral Q8H PRN Jessica Simental MD        Or    ondansetron [facility]) injection 4 mg  4 mg IntraVENous Q6H PRNEY Simental MD        metoprolol tartrate (LOPRESSOR) tablet 25 mg  25 mg Oral Q12H Jessica Simental MD   25 mg at 01/24/22 2035    fentaNYL ([facility]) 50 mcg/hr patch 1 Patch  1 Patch TransDERmal Q72H John Chowdary MD   1 Patch at 01/23/22 0835    0.9% sodium chloride infusion 250 mL  250 mL IntraVENous PRN Aaliyah Mata MD        sodium chloride (NS) flush 5-40 mL  5-40 mL IntraVENous Q8H John Chowdary MD   10 mL at 01/25/22 6683    sodium chloride (NS) flush 5-40 mL  5-40 mL IntraVENous PRN John Chowdary MD        acetaminophen (TYLENOL) tablet 650 mg  650 mg Oral Q6H John Chowdary MD   650 mg at 01/25/22 0206    naloxone [facility]) injection 0.4 mg  0.4 mg IntraVENous PRN John Chowdary MD        diphenhydrAMINE (BENADRYL) capsule 25 mg  25 mg Oral Q6H PRN John Chowdary MD        phenol throat spray (CHLORASEPTIC) 1 Spray  1 Spray Oral PRN John Chowdary MD        benzocaine-menthoL (CEPACOL) lozenge 1 Lozenge  1 Lozenge Oral PRN John Chowdary MD   1 Lozenge at 01/20/22 0306     Objective:     Patient Vitals for the past 24 hrs:   BP Temp Pulse Resp SpO2   01/25/22 0609 -- -- (!) 103 -- --   01/25/22 0600 (!) 132/94 98 °F (36.7 °C) (!) 102 17 100 %   01/25/22 0400 -- -- (!) 109 -- --   01/25/22 0200 (!) 133/95 98 °F (36.7 °C) (!) 111 20 100 %   01/25/22 0145 (!) 133/95 98 °F (36.7 °C) (!) 117 18 97 %   01/24/22 2200 (!) 154/110 98.2 °F (36.8 °C) (!) 113 16 96 %   01/24/22 2041 -- -- (!) 109 -- --   01/24/22 2035 (!) 154/110 -- (!) 111 -- --   01/24/22 1801 (!) 151/99 98.1 °F (36.7 °C) (!) 108 16 99 %   01/24/22 1800 -- -- (!) 111 -- --   01/24/22 1401 127/79 99.7 °F (37.6 °C) (!) 108 16 98 %   01/24/22 1400 -- -- (!) 113 -- --   01/24/22 1200 -- -- (!) 110 -- --   01/24/22 1019 (!) 139/98 97.8 °F (36.6 °C) (!) 129 12 97 %   01/24/22 1000 -- -- (!) 127 -- --   01/24/22 0958 (!) 142/94 -- (!) 134 -- --       Gen: NAD  HEENT: PERRL, Sclerae anicteric  Cv: RRR without m/r/g  Pulm: CTA bilaterally  Abd: NABS, NTND, No HSM  Ext: No c/c/e    Available labs reviewed:  Labs:  No results found for this or any previous visit (from the past 24 hour(s)). Assessment and Plan     34 y/o woman with stage IV breast CA on presentation, initial biopsy ER+/Her-2 negative. 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 Treated with docetaxel/pertuzumab/trastuzuamb initially, then maintained on Lupron/Cariln. Now with worsening bony disease and lumbar SCC s/p laminectomy. 1. SCC: tentative plan is for inpatient rehab. 2. Breast Ca: Path shows Her-2/Lesley IHC 1+ and therefore negative. Given prior discordance, pathology is also running [address]. I have communicated this to Dr. Apple Bergman and asked the patient to contact his office to arrange a f/u in the next 7 days. 3. Prevention: would prefer continuing SQ lovenox for prophylaxis for a total of 14 days when she goes to rehab. Today = #6, stop 2/2/22. Thank you for allowing us to participate in the care of this very pleasant patient.     Nela Parker MD  Hematology/Oncology  Phone [phone]'

## 2022-01-26 PROCEDURE — 97530 THERAPEUTIC ACTIVITIES: CPT

## 2022-01-26 PROCEDURE — 74011250637 HC RX REV CODE- 250/637: Performed by: STUDENT IN AN ORGANIZED HEALTH CARE EDUCATION/TRAINING PROGRAM

## 2022-01-26 PROCEDURE — 74011250636 HC RX REV CODE- 250/636: Performed by: NEUROLOGICAL SURGERY

## 2022-01-26 PROCEDURE — 74011250637 HC RX REV CODE- 250/637: Performed by: HOSPITALIST

## 2022-01-26 PROCEDURE — 97535 SELF CARE MNGMENT TRAINING: CPT

## 2022-01-26 PROCEDURE — 97116 GAIT TRAINING THERAPY: CPT

## 2022-01-26 PROCEDURE — 74011000250 HC RX REV CODE- 250: Performed by: STUDENT IN AN ORGANIZED HEALTH CARE EDUCATION/TRAINING PROGRAM

## 2022-01-26 PROCEDURE — 74011250637 HC RX REV CODE- 250/637: Performed by: SPECIALIST

## 2022-01-26 PROCEDURE — 74011000250 HC RX REV CODE- 250: Performed by: SPECIALIST

## 2022-01-26 PROCEDURE — 74011250637 HC RX REV CODE- 250/637: Performed by: PHYSICIAN ASSISTANT

## 2022-01-26 PROCEDURE — 65660000000 HC RM CCU STEPDOWN

## 2022-01-26 PROCEDURE — 51798 US URINE CAPACITY MEASURE: CPT

## 2022-01-26 RX ORDER — BACLOFEN 10 MG/1
10 TABLET ORAL 3 TIMES DAILY
Status: DISCONTINUED | OUTPATIENT
Start: 2022-01-26 | End: 2022-01-29

## 2022-01-26 RX ORDER — FENTANYL 75 UG/H
1 PATCH TRANSDERMAL
Status: DISCONTINUED | OUTPATIENT
Start: 2022-01-26 | End: 2022-02-01 | Stop reason: HOSPADM

## 2022-01-26 RX ORDER — GABAPENTIN 400 MG/1
400 CAPSULE ORAL 3 TIMES DAILY
Status: DISCONTINUED | OUTPATIENT
Start: 2022-01-26 | End: 2022-02-01 | Stop reason: HOSPADM

## 2022-01-26 RX ADMIN — ENOXAPARIN SODIUM 40 MG: 100 INJECTION SUBCUTANEOUS at 11:07

## 2022-01-26 RX ADMIN — POLYETHYLENE GLYCOL 3350 17 G: 17 POWDER, FOR SOLUTION ORAL at 09:03

## 2022-01-26 RX ADMIN — SODIUM CHLORIDE, PRESERVATIVE FREE 10 ML: 5 INJECTION INTRAVENOUS at 22:53

## 2022-01-26 RX ADMIN — ACETAMINOPHEN 650 MG: 325 TABLET ORAL at 11:06

## 2022-01-26 RX ADMIN — ACETAMINOPHEN 650 MG: 325 TABLET ORAL at 22:59

## 2022-01-26 RX ADMIN — METOPROLOL TARTRATE 25 MG: 25 TABLET, FILM COATED ORAL at 22:52

## 2022-01-26 RX ADMIN — BACLOFEN 10 MG: 10 TABLET ORAL at 22:52

## 2022-01-26 RX ADMIN — SENNOSIDES AND DOCUSATE SODIUM 1 TABLET: 50; 8.6 TABLET ORAL at 09:03

## 2022-01-26 RX ADMIN — ACETAMINOPHEN 650 MG: 325 TABLET ORAL at 15:52

## 2022-01-26 RX ADMIN — BACLOFEN 10 MG: 10 TABLET ORAL at 16:13

## 2022-01-26 RX ADMIN — HYDROMORPHONE HYDROCHLORIDE 2 MG: 2 TABLET ORAL at 12:57

## 2022-01-26 RX ADMIN — GABAPENTIN 400 MG: 400 CAPSULE ORAL at 15:52

## 2022-01-26 RX ADMIN — ACETAMINOPHEN 650 MG: 325 TABLET ORAL at 23:00

## 2022-01-26 RX ADMIN — METOPROLOL TARTRATE 25 MG: 25 TABLET, FILM COATED ORAL at 09:03

## 2022-01-26 RX ADMIN — GABAPENTIN 400 MG: 400 CAPSULE ORAL at 22:52

## 2022-01-26 RX ADMIN — HYDROMORPHONE HYDROCHLORIDE 2 MG: 2 TABLET ORAL at 09:03

## 2022-01-26 RX ADMIN — CYCLOBENZAPRINE 5 MG: 10 TABLET, FILM COATED ORAL at 09:03

## 2022-01-26 RX ADMIN — GABAPENTIN 300 MG: 300 CAPSULE ORAL at 09:03

## 2022-01-26 RX ADMIN — HYDROMORPHONE HYDROCHLORIDE 2 MG: 2 TABLET ORAL at 20:34

## 2022-01-26 RX ADMIN — ACETAMINOPHEN 650 MG: 325 TABLET ORAL at 01:40

## 2022-01-26 RX ADMIN — HYDROMORPHONE HYDROCHLORIDE 2 MG: 2 TABLET ORAL at 17:07

## 2022-01-26 RX ADMIN — HYDROMORPHONE HYDROCHLORIDE 2 MG: 2 TABLET ORAL at 05:15

## 2022-01-26 RX ADMIN — ACETAMINOPHEN 650 MG: 325 TABLET ORAL at 05:15

## 2022-01-26 NOTE — PROGRESS NOTES
Problem: Falls - Risk of  Goal: *Absence of Falls  Description: Document Silvia Skill Fall Risk and appropriate interventions in the flowsheet. Outcome: Progressing Towards Goal  Note: Fall Risk Interventions:  Mobility Interventions: Communicate number of staff needed for ambulation/transfer,OT consult for ADLs,Patient to call before getting OOB,PT Consult for mobility concerns,Utilize gait belt for transfers/ambulation         Medication Interventions: Assess postural VS orthostatic hypotension,Patient to call before getting OOB,Teach patient to arise slowly    Elimination Interventions: Call light in reach,Patient to call for help with toileting needs,Toileting schedule/hourly rounds,Toilet paper/wipes in reach              Problem: Patient Education: Go to Patient Education Activity  Goal: Patient/Family Education  Outcome: Progressing Towards Goal     Problem: Pain  Goal: *Control of Pain  Outcome: Progressing Towards Goal  Goal: *PALLIATIVE CARE:  Alleviation of Pain  Outcome: Progressing Towards Goal     Problem: Patient Education: Go to Patient Education Activity  Goal: Patient/Family Education  Outcome: Progressing Towards Goal     Problem: Pressure Injury - Risk of  Goal: *Prevention of pressure injury  Description: Document Broderick Scale and appropriate interventions in the flowsheet. Outcome: Progressing Towards Goal  Note: Pressure Injury Interventions:  Sensory Interventions: Assess changes in LOC,Assess need for specialty bed,Avoid rigorous massage over bony prominences,Keep linens dry and wrinkle-free,Minimize linen layers,Pressure redistribution bed/mattress (bed type),Turn and reposition approx.  every two hours (pillows and wedges if needed)    Moisture Interventions: Absorbent underpads,Apply protective barrier, creams and emollients,Assess need for specialty bed,Check for incontinence Q2 hours and as needed,Minimize layers    Activity Interventions: Assess need for specialty bed,Pressure redistribution bed/mattress(bed type),PT/OT evaluation    Mobility Interventions: Assess need for specialty bed,Float heels,HOB 30 degrees or less,Pressure redistribution bed/mattress (bed type),PT/OT evaluation,Turn and reposition approx.  every two hours(pillow and wedges)    Nutrition Interventions: Document food/fluid/supplement intake,Offer support with meals,snacks and hydration    Friction and Shear Interventions: Apply protective barrier, creams and emollients,HOB 30 degrees or less,Lift sheet,Minimize layers,Sit at 90-degree angle                Problem: Patient Education: Go to Patient Education Activity  Goal: Patient/Family Education  Outcome: Progressing Towards Goal     Problem: Patient Education: Go to Patient Education Activity  Goal: Patient/Family Education  Outcome: Progressing Towards Goal     Problem: Discharge Planning  Goal: *Discharge to safe environment  Outcome: Progressing Towards Goal  Goal: *Knowledge of medication management  Outcome: Progressing Towards Goal  Goal: *Knowledge of discharge instructions  Outcome: Progressing Towards Goal     Problem: Patient Education: Go to Patient Education Activity  Goal: Patient/Family Education  Outcome: Progressing Towards Goal     Problem: Patient Education: Go to Patient Education Activity  Goal: Patient/Family Education  Outcome: Progressing Towards Goal

## 2022-01-26 NOTE — PROGRESS NOTES
Bedside and Verbal shift change report given to [facility] (oncoming nurse) by Uriel Isaac (offgoing nurse).  Report included the following information SBAR, Kardex, OR Summary, Procedure Summary, Intake/Output, Recent Results and Cardiac Rhythm ST.

## 2022-01-26 NOTE — PROGRESS NOTES
Hospitalist Progress Note      Hospital summary: 33 y.o lady w/ metastatic breast cancer who presented as a transfer from [facility] for management of cord compression from spine mets. Assessment/Plan:  Cord compression in the setting of metastatic breast cancer:  -s/p T11-L3 laminectomy on 1/19  -dexamethasone - completed course  -PT/OT as able  -neurosurgery and oncology consulted  -f/u pathology    Bilateral leg pain: some of this seems to be neuropathic  -continue fentanyl patch - increased dose to 75mcg/hr, PRN Dilaudid; IV stopped   -continue gabapentin - increase dose to 400 mg TID  -continue bowel regimen    Sinus tachycardia: asymptomatic; monitor. History of SVT on metoprolol. Code status: full  DVT prophylaxis: sq Lovenox - continue until 2/2/22 per oncology  Disposition: rehab when medically appropriate  ----------------------------------------------    CC: bilateral leg pain    S: gets significant b/l leg pain w/ activity, otherwise it's controlled. No n/v/d, dyspnea. Review of Systems:  Pertinent items are noted in HPI.     O:  Visit Vitals  /82 (BP 1 Location: Right arm, BP Patient Position: At rest)   Pulse (!) 113   Temp 98.3 °F (36.8 °C)   Resp 21   Ht 5' 2\" (1.575 m)   Wt 94.3 kg (207 lb 14.3 oz)   SpO2 96%   BMI 38.02 kg/m²       PHYSICAL EXAM:  Gen: NAD, non-toxic, obese  HEENT: anicteric sclerae, normal conjunctiva  Neck: supple, trachea midline, no adenopathy  Heart: RRR, no MRG, no JVD, no peripheral edema  Lungs: CTA b/l, non-labored respirations  Abd: soft, NT, ND, BS+  Extr: warm  Skin: dry, no rash  Neuro: CN II-XII grossly intact, normal speech, minimal b/l LE movement 2/5 (unchanged)  Psych: normal mood, appropriate affect      Intake/Output Summary (Last 24 hours) at 1/26/2022 1804  Last data filed at 1/26/2022 0800  Gross per 24 hour   Intake --   Output 900 ml   Net -900 ml        Recent labs & imaging reviewed:  No results found for this or any previous visit (from the past 24 hour(s)). Recent Labs     01/24/22  0255   WBC 8.9   HGB 9.2*   HCT 28.9*        Recent Labs     01/24/22  0255   *   K 5.1   CL 95*   CO2 28   BUN 11   CREA 0.42*   *   CA 9.4   MG 2.1     No results for input(s): ALT, AP, TBIL, TBILI, TP, ALB, GLOB, GGT, AML, LPSE in the last 72 hours. No lab exists for component: SGOT, GPT, AMYP, HLPSE  No results for input(s): INR, PTP, APTT, INREXT, INREXT in the last 72 hours. No results for input(s): FE, TIBC, PSAT, FERR in the last 72 hours. No results found for: FOL, RBCF   No results for input(s): PH, PCO2, PO2 in the last 72 hours. No results for input(s): CPK, CKNDX, TROIQ in the last 72 hours.     No lab exists for component: CPKMB  No results found for: CHOL, CHOLX, CHLST, CHOLV, HDL, HDLP, LDL, LDLC, DLDLP, TGLX, TRIGL, TRIGP, CHHD, CHHDX  No results found for: GLUCPOC  No results found for: COLOR, APPRN, SPGRU, REFSG, ROMERO, PROTU, GLUCU, KETU, BILU, UROU, SANAZ, LEUKU, GLUKE, EPSU, BACTU, WBCU, RBCU, CASTS, UCRY    Med list reviewed  Current Facility-Administered Medications   Medication Dose Route Frequency    fentaNYL (DURAGESIC) 75 mcg/hr patch 1 Patch  1 Patch TransDERmal Q72H    gabapentin (NEURONTIN) capsule 400 mg  400 mg Oral TID    baclofen (LIORESAL) tablet 10 mg  10 mg Oral TID    enoxaparin (LOVENOX) injection 40 mg  40 mg SubCUTAneous Q24H    bisacodyL (DULCOLAX) suppository 10 mg  10 mg Rectal DAILY PRN    senna-docusate (PERICOLACE) 8.6-50 mg per tablet 1 Tablet  1 Tablet Oral DAILY    HYDROmorphone (DILAUDID) tablet 1 mg  1 mg Oral Q4H PRN    HYDROmorphone (DILAUDID) tablet 2 mg  2 mg Oral Q4H PRN    polyethylene glycol (MIRALAX) packet 17 g  17 g Oral DAILY    sodium chloride (NS) flush 5-40 mL  5-40 mL IntraVENous Q8H    sodium chloride (NS) flush 5-40 mL  5-40 mL IntraVENous PRN    acetaminophen (TYLENOL) tablet 650 mg  650 mg Oral Q6H PRN    Or    acetaminophen (TYLENOL) suppository 650 mg  650 mg Rectal Q6H PRN    ondansetron (ZOFRAN ODT) tablet 4 mg  4 mg Oral Q8H PRN    Or    ondansetron (ZOFRAN) injection 4 mg  4 mg IntraVENous Q6H PRN    metoprolol tartrate (LOPRESSOR) tablet 25 mg  25 mg Oral Q12H    0.9% sodium chloride infusion 250 mL  250 mL IntraVENous PRN    sodium chloride (NS) flush 5-40 mL  5-40 mL IntraVENous Q8H    sodium chloride (NS) flush 5-40 mL  5-40 mL IntraVENous PRN    acetaminophen (TYLENOL) tablet 650 mg  650 mg Oral Q6H    naloxone (NARCAN) injection 0.4 mg  0.4 mg IntraVENous PRN    diphenhydrAMINE (BENADRYL) capsule 25 mg  25 mg Oral Q6H PRN    phenol throat spray (CHLORASEPTIC) 1 Spray  1 Spray Oral PRN    benzocaine-menthoL (CEPACOL) lozenge 1 Lozenge  1 Lozenge Oral PRN       Care Plan discussed with:  Patient/Family, Nurse and     Aggie Nuno MD  Internal Medicine  Date of Service: 1/26/2022

## 2022-01-26 NOTE — PROGRESS NOTES
Problem: Mobility Impaired (Adult and Pediatric)  Goal: *Acute Goals and Plan of Care (Insert Text)  Description: FUNCTIONAL STATUS PRIOR TO ADMISSION: Patient was independent and active without use of DME. She has history of breast cancer with mets to the spine. HOME SUPPORT PRIOR TO ADMISSION: Pt lives with her 9year old son. Her mother is also involved in her care. Physical Therapy Goals    Reassessed on 1/24/2022  1. Patient will move from supine to sit and sit to supine , scoot up and down, and roll side to side in bed with moderate assistance  within 7 days. 2. Patient will perform sit to stand with maximal assistance within 7 days. 3. Patient will ambulate with maximal assistance for 5 feet with the least restrictive device within 7 days. 4. Patient will verbalize and demonstrate understanding of spinal precautions (No bending, lifting greater than 5 lbs, or twisting; log-roll technique; frequent repositioning as instructed) within 7 days. Initiated 1/20/2022  1. Patient will move from supine to sit and sit to supine , scoot up and down, and roll side to side in bed with moderate assistance  within 4 days. 2. Patient will perform sit to stand with maximal assistance within 4 days. 3. Patient will ambulate with maximal assistance for 5 feet with the least restrictive device within 4 days. 4. Patient will verbalize and demonstrate understanding of spinal precautions (No bending, lifting greater than 5 lbs, or twisting; log-roll technique; frequent repositioning as instructed) within 4 days.   Outcome: Progressing Towards Goal  PHYSICAL THERAPY TREATMENT  Patient: Kelli Brown (39 y.o. female)  Date: 1/26/2022  Diagnosis: Cord compression syndrome (HCC) [G95.20] <principal problem not specified>  Procedure(s) (LRB):  T11 - L2 LAMINECTOMY AND FUSION WITH O-ARM (N/A) 7 Days Post-Op  Precautions: Fall,Back, brace when OOB  Chart, physical therapy assessment, plan of care and goals were reviewed. ASSESSMENT  Patient continues with skilled PT services and is progressing towards goals. Continues to present with pain, BLE weakness, impaired balance, unsteady gait, decreased activity tolerance, tachycardia, and overall decline in functional mobility. Pt seen for PT session after pain medication administration. Pt motivated and hardworking during therapy sessions. Able to recall back precautions, adhere to them during session, and verbalize steps for log roll technique. Transferred supine>sit with modA and sit<>stand with Emmett x2. Pt was able to take steps bed>BSC and BSC>chair with modA x2 and use of RW. She required assistance for weight shifting and advancing LE (R>L). HR elevated as high as 188 bpm, improved to 130s with seated rest.  Ended session in chair with all needs met. Recommending IPR upon discharge. Current Level of Function Impacting Discharge (mobility/balance): modA for supine>sit transfer and for taking steps    Other factors to consider for discharge: PMH, pain, fall risk, highly motivated, tachycardic          PLAN :  Patient continues to benefit from skilled intervention to address the above impairments. Continue treatment per established plan of care. to address goals. Recommendation for discharge: (in order for the patient to meet his/her long term goals)  Therapy 3 hours per day 5-7 days per week    This discharge recommendation:  Has not yet been discussed the attending provider and/or case management    IF patient discharges home will need the following DME: to be determined (TBD)       SUBJECTIVE:   Patient stated It feels like I'm working hard.     OBJECTIVE DATA SUMMARY:   Critical Behavior:  Neurologic State: Alert  Orientation Level: Oriented X4  Cognition: Appropriate decision making,Appropriate safety awareness     Functional Mobility Training:  Bed Mobility:  Rolling: Minimum assistance  Supine to Sit: Moderate assistance    Transfers:  Sit to Stand: Minimum assistance;Assist x2  Stand to Sit: Minimum assistance;Assist x2  Bed to Chair: Moderate assistance;Assist x2    Balance:  Sitting: Impaired  Sitting - Static: Good (unsupported)  Sitting - Dynamic: Fair (occasional)  Standing: Impaired; With support  Standing - Static: Poor;Constant support  Standing - Dynamic : Poor;Constant support    Ambulation/Gait Training:  Distance (ft): 3 Feet (ft) (3 ft x2 reps side stepping)  Assistive Device: Gait belt;Walker, rolling  Ambulation - Level of Assistance: Moderate assistance;Assist x2  Base of Support: Narrowed  Speed/Deedee: Slow;Shuffled  Step Length: Right shortened;Left shortened  Swing Pattern: Left asymmetrical;Right asymmetrical      Activity Tolerance:   Fair and Poor    After treatment patient left in no apparent distress:   Sitting in chair, Call bell within reach, and Bed / chair alarm activated    COMMUNICATION/COLLABORATION:   The patients plan of care was discussed with: Occupational therapist and Registered nurse.      Darrell Myers, PT, DPT   Time Calculation: 29 mins

## 2022-01-26 NOTE — PROGRESS NOTES
Problem: Self Care Deficits Care Plan (Adult)  Goal: *Acute Goals and Plan of Care (Insert Text)  Description: FUNCTIONAL STATUS PRIOR TO ADMISSION: At baseline, patient was independent with ADLs, IADLs, and functional mobility/ ambulatory without AD. Was ambulatory with a RW for ~1 month PTA, then was unable to walk for a few days prior to admission. Endorses no falls. HOME SUPPORT: Patient resided with her 10 y/o son and did not require assistance at baseline. Is anticipating staying at her mother's house when she returns home. Occupational Therapy Goals  Initiated 1/20/2022  1. Patient will perform upper body dressing with supervision/set-up within 7 day(s). 2.  Patient will perform lower body dressing using AE with moderate assistance  within 7 day(s). 3.  Patient will perform sliding board transfer to [facility] with moderate assistance  within 7 day(s). 4.  Patient will perform all aspects of toileting with moderate assistance  within 7 day(s). 5.  Patient will participate in upper extremity therapeutic exercise/activities with supervision/set-up for 10 minutes within 7 day(s). 6.  Patient will don/doff back brace at supervision/set-up within 7 days. 7.  Patient will verbalize/demonstrate 3/3 back precautions during ADL tasks without cues within 7 days. Outcome: Progressing Towards Goal     OCCUPATIONAL THERAPY TREATMENT  Patient: Jessica Phan (23 y.o. female)  Date: 1/26/2022  Diagnosis: Cord compression syndrome ([facility] [address] [G95.20] <principal problem not specified>  Procedure(s) (LRB):  T11 - L2 LAMINECTOMY AND FUSION WITH O-ARM (N/A) 7 Days Post-Op  Precautions: Fall,Back  Chart, occupational therapy assessment, plan of care, and goals were reviewed. ASSESSMENT  Patient continues with skilled OT services and is progressing towards goals.   Patient remains limited by BLE weakness (improving), tachycardia with activity (up to 188 with standing), impaired functional reach for LB ADLs, and impaired sitting/ standing balance. Patient sustained mobility progress today from yesterday. Also progressed to practice [facility] transfer and donned TLSO with minimum assistance + additional time and verbal cues. Patient also seen for second session later in day for return to bed and required same OWEN. Patient remains significantly below functional baseline, participates well in therapy sessions, and is motivated to progress. She is an excellent candidate for inpatient rehab at /. Current Level of Function Impacting Discharge (ADLs/self-care): Required minimum assistance for rolling, moderate assistance x1-2 supine<>sit/ logrolling, and moderate assistance x2 for sit-stand/ pivot transfer to/ from [facility] and chair. Infer overall patient requires set-up to moderate assistance UB ADLs and total assistance LB ADLs     PLAN :  Patient continues to benefit from skilled intervention to address the above impairments. Continue treatment per established plan of care to address goals. Recommendation for discharge: (in order for the patient to meet his/her long term goals)  Therapy 3 hours per day 5-7 days per week    This discharge recommendation:  Has been made in collaboration with the attending provider and/or case management         SUBJECTIVE:   Patient stated It's getting better    OBJECTIVE DATA SUMMARY:   Cognitive/Behavioral Status:  Neurologic State: Alert  Orientation Level: Oriented X4  Cognition: Appropriate decision making; Appropriate safety awareness             Functional Mobility and Transfers for ADLs:  Bed Mobility:  Rolling: Minimum assistance  Supine to Sit: Moderate assistance  Sit to Supine: Moderate assistance;Assist x2    Transfers:  Sit to Stand: Minimum assistance;Assist x2  Functional Transfers  Toilet Transfer :  Moderate assistance;Assist x2 (to [facility])  Bed to Chair: Moderate assistance;Assist x2    Balance:  Sitting: Impaired  Sitting - Static: Good (unsupported)  Sitting - Dynamic: Fair (occasional)  Standing: Impaired; With support  Standing - Static: Poor;Constant support  Standing - Dynamic : Poor;Constant support    Pain:  Patient reported back pain with activity, improving with rest/ repositioning    Activity Tolerance: Tachycardic    After treatment patient left in no apparent distress:   Supine in bed and Call bell within reach    COMMUNICATION/COLLABORATION:   The patients plan of care was discussed with: Physical therapist, Registered nurse, and Case management.      Yamila Khan OT  Time Calculation: 10 mins

## 2022-01-26 NOTE — PROGRESS NOTES
Spine Surgery Progress Note  Silvestre Armas PA-C    Admit Date: 2022   LOS: 7 days      Daily Progress Note: 2022    POD: 7 Day Post-Op    S/P: Procedure(s):  T11 - L2 LAMINECTOMY AND FUSION WITH O-ARM    Subjective:     Ms. Eh Barrios is a very pleasant 56-year-old female with a known history of metastatic breast cancer. PET scan in the end of December showed disease in the spine and liver. Pt has had two visits to the emergency room on  and  for low back pain and urinary retention. For the last two days, she has had inability to walk and an inability to move her legs. Sensation is intact however, she only has minimal movement of her toes, no movement in her legs. Patient was transferred here from [facility] to [facility] for MRI. Imagine revealed \"Extensive bony metastatic disease with diffuse areas of epidural spread of  tumor. Most significant from T10 through L2 with severe cord compression at L1 and superior aspect of L2 and associated pathologic fracture of L1. Soft tissue extension of tumor into the right paraspinous muscles at L1 with patchy bilateral muscular enhancement. \"    Patient underwent T11-L3 laminectomy and fusion surgery with resection of metastatic tumor on 22. She tolerated the procedure well. Pain is well-controlled and improved today. Unable to void yesterday and aguilera replaced. She denies headache, chest pain, nausea, vomiting, and dyspnea. Persistent tachycardia.     Objective:     Vital signs  Temp (24hrs), Av.8 °F (36.6 °C), Min:97.5 °F (36.4 °C), Max:98.3 °F (36.8 °C)   701 - 1900  In: -   Out: 900 [Urine:900]  1901 -  0700  In: -   Out: 9934 [Urine:1050]    Visit Vitals  /88 (BP 1 Location: Right arm, BP Patient Position: At rest)   Pulse (!) 126   Temp 97.7 °F (36.5 °C)   Resp 20   Ht 5' 2\" (1.575 m)   Wt 94.3 kg (207 lb 14.3 oz)   SpO2 96%   BMI 38.02 kg/m²      O2 Device: None (Room air)       Voiding status: aguilera  Output (mL)  Last Bowel Movement Date: 01/24/22 (01/24/22 0800)     Pain control  Pain Assessment  Pain Scale 1: Numeric (0 - 10)  Pain Intensity 1: 7  Pain Onset 1: post op  Pain Location 1: Back  Pain Orientation 1: Lower,Mid  Pain Description 1: Aching,Constant  Pain Intervention(s) 1: Medication (see MAR)    PT/OT  Gait     Gait  Base of Support: Narrowed  Speed/Deedee: Slow,Shuffled  Step Length: Right shortened,Left shortened  Swing Pattern: Left asymmetrical,Right asymmetrical  Gait Abnormalities: Decreased step clearance,Shuffling gait  Ambulation - Level of Assistance: Moderate assistance,Assist x2  Distance (ft): 3 Feet (ft) (3 ft x2 reps side stepping)  Assistive Device: Gait belt,Walker, rolling        Physical Exam:  Gen: No acute distress. Neuro: A&Ox3. Follows commands. Speech clear. Affect normal.  PERRL. EOMI. Face symmetric. Strength 5/5 in BUE. BLE hip flexors 0/5, knee extension is 1/5, ankle dorsi/plantar flexion 2/5. Wiggles toes bilaterally  Sensation intact. Gait deferred. Calves soft and supple; No pain with passive stretch  Skin: Incision C/D/I with staples in place. No erythema, edema, or drainage      24 hour results:    No results found for this or any previous visit (from the past 24 hour(s)).      Assessment:     Active Problems:    Cord compression syndrome ([facility] [address] (1/19/2022)      Plan:     1) s/p T11-L3 laminectomy/fusion w resection of epidural spinal metastatic breast cancer  -PT/OT - in TLSO (consult for custom TLSO placed)    -Pain control - scheduled tylenol, fentanyl patch, gabapentin, prn dilaudid   -decadron taper completed 01/24   -Regular diet   -Cont aguilera; voiding trial at [facility] will need to be removed 2/2/22   -Lovenox for DVT/PE ppx  2) Breast CA   -Dr. Micaela Nair is primary oncologist   -Per oncology, further systemic tx will be dependent on tissue pathology   -Path pending   -Will need radiation 4-6 weeks after surgery  3) Hx of SVT   -Cont metoprolol  3) Persistent tachycardia   -Hospitalist following   -Asymptomatic   -Cont metoprolol    Plan d/w Dr. Tang Momin, nurse. Awaiting insurance authorization for inpatient rehab.     EMILIA Thompson

## 2022-01-27 ENCOUNTER — APPOINTMENT (OUTPATIENT)
Dept: VASCULAR SURGERY | Age: 32
DRG: 029 | End: 2022-01-27
Attending: FAMILY MEDICINE
Payer: COMMERCIAL

## 2022-01-27 PROCEDURE — 74011250637 HC RX REV CODE- 250/637: Performed by: STUDENT IN AN ORGANIZED HEALTH CARE EDUCATION/TRAINING PROGRAM

## 2022-01-27 PROCEDURE — 74011250637 HC RX REV CODE- 250/637: Performed by: SPECIALIST

## 2022-01-27 PROCEDURE — 74011250637 HC RX REV CODE- 250/637: Performed by: FAMILY MEDICINE

## 2022-01-27 PROCEDURE — 74011000250 HC RX REV CODE- 250: Performed by: STUDENT IN AN ORGANIZED HEALTH CARE EDUCATION/TRAINING PROGRAM

## 2022-01-27 PROCEDURE — 74011250637 HC RX REV CODE- 250/637: Performed by: PHYSICIAN ASSISTANT

## 2022-01-27 PROCEDURE — 97530 THERAPEUTIC ACTIVITIES: CPT

## 2022-01-27 PROCEDURE — 65660000000 HC RM CCU STEPDOWN

## 2022-01-27 PROCEDURE — 97116 GAIT TRAINING THERAPY: CPT

## 2022-01-27 PROCEDURE — 93970 EXTREMITY STUDY: CPT

## 2022-01-27 PROCEDURE — 74011000250 HC RX REV CODE- 250: Performed by: SPECIALIST

## 2022-01-27 PROCEDURE — 97535 SELF CARE MNGMENT TRAINING: CPT

## 2022-01-27 PROCEDURE — 74011250637 HC RX REV CODE- 250/637: Performed by: HOSPITALIST

## 2022-01-27 PROCEDURE — 74011250636 HC RX REV CODE- 250/636: Performed by: NEUROLOGICAL SURGERY

## 2022-01-27 RX ORDER — HYDROMORPHONE HYDROCHLORIDE 2 MG/1
3 TABLET ORAL
Status: DISCONTINUED | OUTPATIENT
Start: 2022-01-27 | End: 2022-01-28

## 2022-01-27 RX ORDER — METOPROLOL TARTRATE 25 MG/1
50 TABLET, FILM COATED ORAL EVERY 12 HOURS
Status: DISCONTINUED | OUTPATIENT
Start: 2022-01-27 | End: 2022-02-01 | Stop reason: HOSPADM

## 2022-01-27 RX ORDER — AMOXICILLIN 250 MG
2 CAPSULE ORAL 2 TIMES DAILY
Status: DISCONTINUED | OUTPATIENT
Start: 2022-01-27 | End: 2022-02-01 | Stop reason: HOSPADM

## 2022-01-27 RX ADMIN — SODIUM CHLORIDE, PRESERVATIVE FREE 10 ML: 5 INJECTION INTRAVENOUS at 14:00

## 2022-01-27 RX ADMIN — SODIUM CHLORIDE, PRESERVATIVE FREE 10 ML: 5 INJECTION INTRAVENOUS at 21:43

## 2022-01-27 RX ADMIN — ACETAMINOPHEN 650 MG: 325 TABLET ORAL at 17:50

## 2022-01-27 RX ADMIN — SODIUM CHLORIDE, PRESERVATIVE FREE 10 ML: 5 INJECTION INTRAVENOUS at 21:44

## 2022-01-27 RX ADMIN — HYDROMORPHONE HYDROCHLORIDE 2 MG: 2 TABLET ORAL at 17:54

## 2022-01-27 RX ADMIN — GABAPENTIN 400 MG: 400 CAPSULE ORAL at 16:43

## 2022-01-27 RX ADMIN — POLYETHYLENE GLYCOL 3350 17 G: 17 POWDER, FOR SOLUTION ORAL at 08:48

## 2022-01-27 RX ADMIN — ACETAMINOPHEN 650 MG: 325 TABLET ORAL at 11:42

## 2022-01-27 RX ADMIN — HYDROMORPHONE HYDROCHLORIDE 2 MG: 2 TABLET ORAL at 08:47

## 2022-01-27 RX ADMIN — HYDROMORPHONE HYDROCHLORIDE 2 MG: 2 TABLET ORAL at 00:17

## 2022-01-27 RX ADMIN — SENNOSIDES AND DOCUSATE SODIUM 1 TABLET: 50; 8.6 TABLET ORAL at 08:47

## 2022-01-27 RX ADMIN — ENOXAPARIN SODIUM 40 MG: 100 INJECTION SUBCUTANEOUS at 11:42

## 2022-01-27 RX ADMIN — ACETAMINOPHEN 650 MG: 325 TABLET ORAL at 06:51

## 2022-01-27 RX ADMIN — HYDROMORPHONE HYDROCHLORIDE 3 MG: 2 TABLET ORAL at 20:45

## 2022-01-27 RX ADMIN — GABAPENTIN 400 MG: 400 CAPSULE ORAL at 08:47

## 2022-01-27 RX ADMIN — HYDROMORPHONE HYDROCHLORIDE 2 MG: 2 TABLET ORAL at 04:37

## 2022-01-27 RX ADMIN — METOPROLOL TARTRATE 50 MG: 25 TABLET, FILM COATED ORAL at 21:43

## 2022-01-27 RX ADMIN — BACLOFEN 10 MG: 10 TABLET ORAL at 08:58

## 2022-01-27 RX ADMIN — GABAPENTIN 400 MG: 400 CAPSULE ORAL at 21:43

## 2022-01-27 RX ADMIN — SENNOSIDES AND DOCUSATE SODIUM 2 TABLET: 8.6; 5 TABLET ORAL at 17:50

## 2022-01-27 RX ADMIN — BACLOFEN 10 MG: 10 TABLET ORAL at 21:43

## 2022-01-27 RX ADMIN — SODIUM CHLORIDE, PRESERVATIVE FREE 10 ML: 5 INJECTION INTRAVENOUS at 06:52

## 2022-01-27 RX ADMIN — HYDROMORPHONE HYDROCHLORIDE 3 MG: 2 TABLET ORAL at 23:28

## 2022-01-27 RX ADMIN — METOPROLOL TARTRATE 25 MG: 25 TABLET, FILM COATED ORAL at 08:47

## 2022-01-27 RX ADMIN — ACETAMINOPHEN 650 MG: 325 TABLET ORAL at 23:28

## 2022-01-27 RX ADMIN — HYDROMORPHONE HYDROCHLORIDE 2 MG: 2 TABLET ORAL at 14:23

## 2022-01-27 RX ADMIN — BACLOFEN 10 MG: 10 TABLET ORAL at 16:43

## 2022-01-27 RX ADMIN — LACTULOSE 15 ML: 20 SOLUTION ORAL at 17:51

## 2022-01-27 NOTE — PROGRESS NOTES
Problem: Falls - Risk of  Goal: *Absence of Falls  Description: Document Diana Bhandari Fall Risk and appropriate interventions in the flowsheet. Outcome: Progressing Towards Goal  Note: Fall Risk Interventions:  Mobility Interventions: Bed/chair exit alarm,Communicate number of staff needed for ambulation/transfer,Patient to call before getting OOB         Medication Interventions: Bed/chair exit alarm,Patient to call before getting OOB    Elimination Interventions: Call light in reach,Bed/chair exit alarm,Patient to call for help with toileting needs              Problem: Patient Education: Go to Patient Education Activity  Goal: Patient/Family Education  Outcome: Progressing Towards Goal     Problem: Pressure Injury - Risk of  Goal: *Prevention of pressure injury  Description: Document Broderick Scale and appropriate interventions in the flowsheet. Outcome: Progressing Towards Goal  Note: Pressure Injury Interventions:  Sensory Interventions: Minimize linen layers,Assess changes in LOC,Keep linens dry and wrinkle-free    Moisture Interventions: Absorbent underpads,Minimize layers    Activity Interventions: Assess need for specialty bed,PT/OT evaluation    Mobility Interventions: Assess need for specialty bed,HOB 30 degrees or less,Turn and reposition approx.  every two hours(pillow and wedges)    Nutrition Interventions: Offer support with meals,snacks and hydration    Friction and Shear Interventions: Minimize layers,Transfer aides:transfer board/Randi lift/ceiling lift                Problem: Discharge Planning  Goal: *Knowledge of medication management  Outcome: Progressing Towards Goal

## 2022-01-27 NOTE — PROGRESS NOTES
SHAMEKA:    RUR 3%    Disposition: [facility] accepted. CM spoke to Jeanne Tanner ([phone]) today and they are still waiting on insurance auth.     Transportation: S    Follow up: PCP/Specialist    Family contact: Ann Child(mother) [phone]    Geetha Gastelum RN/CRM  [phone]

## 2022-01-27 NOTE — PROGRESS NOTES
Problem: Self Care Deficits Care Plan (Adult)  Goal: *Acute Goals and Plan of Care (Insert Text)  Description: FUNCTIONAL STATUS PRIOR TO ADMISSION: At baseline, patient was independent with ADLs, IADLs, and functional mobility/ ambulatory without AD. Was ambulatory with a RW for ~1 month PTA, then was unable to walk for a few days prior to admission. Endorses no falls. HOME SUPPORT: Patient resided with her 10 y/o son and did not require assistance at baseline. Is anticipating staying at her mother's house when she returns home. Occupational Therapy Goals  Updated 1/27/2022  1. Patient will perform upper body dressing with supervision/set-up within 7 day(s). 2.  Patient will perform lower body dressing using AE with moderate assistance  within 7 day(s). 3.  Patient will perform stand-pivot transfer to [facility] with 7 day(s). 4.  Patient will perform all aspects of toileting with moderate assistance  within 7 day(s). 5.  Patient will participate in upper extremity therapeutic exercise/activities with supervision/set-up for 10 minutes within 7 day(s). 6.  Patient will don/doff back brace at supervision/set-up within 7 days. 7.  Patient will verbalize/demonstrate 3/3 back precautions during ADL tasks without cues within 7 days. Initiated 1/20/2022  1. Patient will perform upper body dressing with supervision/set-up within 7 day(s). 2.  Patient will perform lower body dressing using AE with moderate assistance  within 7 day(s). 3.  Patient will perform sliding board transfer to [facility] with moderate assistance  within 7 day(s). 4.  Patient will perform all aspects of toileting with moderate assistance  within 7 day(s). 5.  Patient will participate in upper extremity therapeutic exercise/activities with supervision/set-up for 10 minutes within 7 day(s). 6.  Patient will don/doff back brace at supervision/set-up within 7 days.   7.  Patient will verbalize/demonstrate 3/3 back precautions during ADL tasks without cues within 7 days. Outcome: Progressing Towards Goal     OCCUPATIONAL THERAPY TREATMENT/WEEKLY RE-ASSESSMENT  Patient: Bi Emery (84 y.o. female)  Date: 1/27/2022  Diagnosis: Cord compression syndrome (HCC) [G95.20] <principal problem not specified>  Procedure(s) (LRB):  T11 - L2 LAMINECTOMY AND FUSION WITH O-ARM (N/A) 8 Days Post-Op  Precautions: Fall,Back  Chart, occupational therapy assessment, plan of care, and goals were reviewed. ASSESSMENT  Patient continues with skilled OT services and is progressing towards goals (updated). Patient remains limited by BLE weakness (improving), tachycardia with activity (up to 182 today), impaired functional reach for LB ADLs, and impaired sitting/ standing balance. Patient progressed mobility to x1 Emmett to modA + another x1 CGA for safety only. Donned TLSO with Emmett only for initial placement and required reduced verbal cues and less time. Required ~5 min seated rest break on BSC after first stand-pivot for HR to lower from 180s to 140s before performing another stand-pivot to chair. After session, hospitalist gave verbal permission to progress mobility further with HR in 180s. Patient remains significantly below functional baseline, participates well in therapy sessions, and is motivated to progress. She is an excellent candidate for inpatient rehab at d/. Current Level of Function Impacting Discharge (ADLs/self-care): Required minimum assistance for rolling, moderate assistance supine-sit/ logrolling, and moderate assistance for sit-stand/ pivot transfer to/ from [facility] and chair. Infer overall patient requires set-up to moderate assistance UB ADLs and total assistance LB ADLs         PLAN :  Goals have been updated based on progression since last assessment. Patient continues to benefit from skilled intervention to address the above impairments. Continue to follow patient 5 times a week to address goals.     Recommendation for discharge: (in order for the patient to meet his/her long term goals)  Therapy 3 hours per day 5-7 days per week    This discharge recommendation:  Has been made in collaboration with the attending provider and/or case management         SUBJECTIVE:   Patient stated I'm trying not to twist. (excellent awareness of spinal precautions)    OBJECTIVE DATA SUMMARY:   Cognitive/Behavioral Status:  Neurologic State: Alert  Orientation Level: Oriented X4  Cognition: Follows commands  Perception: Appears intact  Perseveration: No perseveration noted  Safety/Judgement: Insight into deficits    Functional Mobility and Transfers for ADLs:  Bed Mobility:  Rolling: Minimum assistance  Supine to Sit: Moderate assistance    Transfers:  Sit to Stand: Moderate assistance  Functional Transfers  Toilet Transfer : Moderate assistance (BSC transfer)  Bed to Chair: Moderate assistance    Balance:  Sitting: Impaired  Sitting - Static: Good (unsupported)  Sitting - Dynamic: Fair (occasional)  Standing: Impaired; With support  Standing - Static: Fair;Constant support (RW)  Standing - Dynamic : Poor;Constant support    ADL Intervention:               Cognitive Retraining  Safety/Judgement: Insight into deficits    Pain:  Patient reported surgical pain with activity, improving with rest/ repositioning    Activity Tolerance: Tachycardic, otherwise good    After treatment patient left in no apparent distress:   Sitting in chair, Call bell within reach, and Bed / chair alarm activated    COMMUNICATION/COLLABORATION:   The patients plan of care was discussed with: Physical therapist, Registered nurse, and Physician.      Aiden Wellington OT  Time Calculation: 27 mins

## 2022-01-27 NOTE — PROGRESS NOTES
Problem: Mobility Impaired (Adult and Pediatric)  Goal: *Acute Goals and Plan of Care (Insert Text)  Description: FUNCTIONAL STATUS PRIOR TO ADMISSION: Patient was independent and active without use of DME. She has history of breast cancer with mets to the spine. HOME SUPPORT PRIOR TO ADMISSION: Pt lives with her 9year old son. Her mother is also involved in her care. Physical Therapy Goals    Reassessed on 1/24/2022  1. Patient will move from supine to sit and sit to supine , scoot up and down, and roll side to side in bed with moderate assistance  within 7 days. 2. Patient will perform sit to stand with maximal assistance within 7 days. 3. Patient will ambulate with maximal assistance for 5 feet with the least restrictive device within 7 days. 4. Patient will verbalize and demonstrate understanding of spinal precautions (No bending, lifting greater than 5 lbs, or twisting; log-roll technique; frequent repositioning as instructed) within 7 days. Initiated 1/20/2022  1. Patient will move from supine to sit and sit to supine , scoot up and down, and roll side to side in bed with moderate assistance  within 4 days. 2. Patient will perform sit to stand with maximal assistance within 4 days. 3. Patient will ambulate with maximal assistance for 5 feet with the least restrictive device within 4 days. 4. Patient will verbalize and demonstrate understanding of spinal precautions (No bending, lifting greater than 5 lbs, or twisting; log-roll technique; frequent repositioning as instructed) within 4 days.         Outcome: Progressing Towards Goal  PHYSICAL THERAPY TREATMENT  Patient: Risa Berg (24 y.o. female)  Date: 1/27/2022  Diagnosis: Cord compression syndrome (HCC) [G95.20] <principal problem not specified>  Procedure(s) (LRB):  T11 - L2 LAMINECTOMY AND FUSION WITH O-ARM (N/A) 8 Days Post-Op  Precautions: Fall,Back, Brace when OOB  Chart, physical therapy assessment, plan of care and goals were reviewed. ASSESSMENT  Patient continues with skilled PT services and is progressing towards goals. Pt presents with impaired balance, decreased activity tolerance, tachycardia and generalized weakness. Pt was modA for supine >sit and sit <>stand. Pt ambulated 3 ft with a RW and modA to transfer to [facility] and then [facility] >chair. HR elevated as high as 182 during mobility, and came down to 140s with seated rest breaks. Continues to show improvements with strength and mobility, but is most limited by her tachycardia. Able to verbalize her spinal precautions and adhere to them during mobility. Pt left in chair with brace donned. Recommending IPR upon discharge. Current Level of Function Impacting Discharge (mobility/balance): modA for mobility    Other factors to consider for discharge: supportive family, PMH, independent baseline, motivated, tachycardia           PLAN :  Patient continues to benefit from skilled intervention to address the above impairments. Continue treatment per established plan of care. to address goals. Recommendation for discharge: (in order for the patient to meet his/her long term goals)  Therapy 3 hours per day 5-7 days per week    This discharge recommendation:  Has not yet been discussed the attending provider and/or case management    IF patient discharges home will need the following DME: to be determined (TBD)       SUBJECTIVE:   Patient stated I said my big goodbye to you and now I'm still here.     OBJECTIVE DATA SUMMARY:   Critical Behavior:  Neurologic State: Alert  Orientation Level: Oriented X4  Cognition: Follows commands  Safety/Judgement: Insight into deficits  Functional Mobility Training:  Bed Mobility:  Rolling: Minimum assistance  Supine to Sit: Moderate assistance              Transfers:  Sit to Stand:  Moderate assistance  Stand to Sit: Moderate assistance        Bed to Chair: Moderate assistance                    Balance:  Sitting: Impaired  Sitting - Static: Good (unsupported)  Sitting - Dynamic: Fair (occasional)  Standing: Impaired; With support  Standing - Static: Fair;Constant support (RW)  Standing - Dynamic : Poor;Constant support  Ambulation/Gait Training:  Distance (ft): 3 Feet (ft)  Assistive Device: Gait belt;Walker, rolling  Ambulation - Level of Assistance: Moderate assistance        Gait Abnormalities: Decreased step clearance;Shuffling gait        Base of Support: Narrowed     Speed/Deedee: Shuffled; Slow         Activity Tolerance:   Fair    After treatment patient left in no apparent distress:   Sitting in chair, Call bell within reach, and Bed / chair alarm activated    COMMUNICATION/COLLABORATION:   The patients plan of care was discussed with: Occupational therapist and Registered nurse. PARVIZ Weems   Time Calculation: 38 mins        Regarding student involvement in patient care:  A student participated in this treatment session. Per CMS Medicare statements and APTA guidelines I certify that the following was true:  1. I was present and directly observed the entire session. 2. I made all skilled judgments and clinical decisions regarding care. 3. I am the practitioner responsible for assessment, treatment, and documentation.

## 2022-01-27 NOTE — PROGRESS NOTES
Spine Surgery Progress Note  Alvaro Flowers PA-C    Admit Date: 2022   LOS: 8 days      Daily Progress Note: 2022    POD: 8 Day Post-Op    S/P: Procedure(s):  T11 - L2 LAMINECTOMY AND FUSION WITH O-ARM    Subjective:     Ms. Marcelo Rodrigues is a very pleasant 18-year-old female with a known history of metastatic breast cancer. PET scan in the end of December showed disease in the spine and liver. Pt has had two visits to the emergency room on  and  for low back pain and urinary retention. For the last two days, she has had inability to walk and an inability to move her legs. Sensation is intact however, she only has minimal movement of her toes, no movement in her legs. Patient was transferred here from [facility] to [facility] for MRI. Imagine revealed \"Extensive bony metastatic disease with diffuse areas of epidural spread of  tumor. Most significant from T10 through L2 with severe cord compression at L1 and superior aspect of L2 and associated pathologic fracture of L1. Soft tissue extension of tumor into the right paraspinous muscles at L1 with patchy bilateral muscular enhancement. \"    Patient underwent T11-L3 laminectomy and fusion surgery with resection of metastatic tumor on 22. She tolerated the procedure well. Pain is well-controlled and improved again today. She denies headache, chest pain, nausea, vomiting, and dyspnea. Persistent tachycardia; asymptomatic. Objective:     Vital signs  Temp (24hrs), Av.1 °F (36.7 °C), Min:97.5 °F (36.4 °C), Max:98.9 °F (37.2 °C)   No intake/output data recorded.    1901 -  0700  In: -   Out: 900 [Urine:900]    Visit Vitals  /86 (BP 1 Location: Right arm, BP Patient Position: At rest)   Pulse (!) 115   Temp 98 °F (36.7 °C)   Resp 15   Ht 5' 2\" (1.575 m)   Wt 93.3 kg (205 lb 11 oz)   SpO2 94%   BMI 37.62 kg/m²      O2 Device: None (Room air)       Voiding status: aguilera  Output (mL)  Last Bowel Movement Date: 01/24/22 (01/24/22 0800)     Pain control  Pain Assessment  Pain Scale 1: Numeric (0 - 10)  Pain Intensity 1: 7  Pain Onset 1: post op  Pain Location 1: Leg  Pain Orientation 1: Lower  Pain Description 1: Shooting  Pain Intervention(s) 1: Medication (see MAR)    PT/OT  Gait     Gait  Base of Support: Narrowed  Speed/Deedee: Slow,Shuffled  Step Length: Right shortened,Left shortened  Swing Pattern: Left asymmetrical,Right asymmetrical  Gait Abnormalities: Decreased step clearance,Shuffling gait  Ambulation - Level of Assistance: Moderate assistance,Assist x2  Distance (ft): 3 Feet (ft) (3 ft x2 reps side stepping)  Assistive Device: Gait belt,Walker, rolling        Physical Exam:  Gen: No acute distress. Neuro: A&Ox3. Follows commands. Speech clear. Affect normal.  PERRL. EOMI. Face symmetric. Strength 5/5 in BUE. BLE hip flexors 0/5, knee extension is 1/5, ankle dorsi/plantar flexion 2/5. Wiggles toes bilaterally  Sensation intact. Gait deferred. Calves soft and supple; No pain with passive stretch  Skin: Incision C/D/I with staples in place. No erythema, edema, or drainage      24 hour results:    No results found for this or any previous visit (from the past 24 hour(s)).      Assessment:     Active Problems:    Cord compression syndrome ([address] (1/19/2022)      Plan:     1) s/p T11-L3 laminectomy/fusion w resection of epidural spinal metastatic breast cancer  -PT/OT - in TLSO (consult for custom TLSO placed)    -Pain control - scheduled tylenol, fentanyl patch, gabapentin, prn dilaudid   -decadron taper completed 01/24   -Regular diet   -Cont aguilera; voiding trial at [facility] will need to be removed 2/2/22   -Lovenox for DVT/PE ppx  2) Breast CA   -Dr. Benny Rosales is primary oncologist   -Per oncology, further systemic tx will be dependent on tissue pathology   -Final path pending   -Will need radiation 4-6 weeks after surgery   -Brain MRI - \"Multifocal osseous metastases in the skull base and upper cervical spine. Otherwise normal brain MRI. No evidence of intracranial metastases otherwise. \"  3) Hx of SVT   -Cont metoprolol  3) Persistent tachycardia   -Hospitalist following   -Asymptomatic   -Cont metoprolol    Plan d/w Dr. Rashi Gongora, nurse, hospitalist. Awaiting insurance authorization for inpatient rehab. Follow up appointments on chart.     EMILIA Lomeli

## 2022-01-28 LAB
ANION GAP SERPL CALC-SCNC: 5 MMOL/L (ref 5–15)
BUN SERPL-MCNC: 13 MG/DL (ref 6–20)
BUN/CREAT SERPL: 30 (ref 12–20)
CALCIUM SERPL-MCNC: 10.5 MG/DL (ref 8.5–10.1)
CHLORIDE SERPL-SCNC: 100 MMOL/L (ref 97–108)
CO2 SERPL-SCNC: 27 MMOL/L (ref 21–32)
CREAT SERPL-MCNC: 0.44 MG/DL (ref 0.55–1.02)
ERYTHROCYTE [DISTWIDTH] IN BLOOD BY AUTOMATED COUNT: 14.1 % (ref 11.5–14.5)
GLUCOSE SERPL-MCNC: 94 MG/DL (ref 65–100)
HCT VFR BLD AUTO: 27.8 % (ref 35–47)
HGB BLD-MCNC: 8.7 G/DL (ref 11.5–16)
MAGNESIUM SERPL-MCNC: 2 MG/DL (ref 1.6–2.4)
MCH RBC QN AUTO: 25.8 PG (ref 26–34)
MCHC RBC AUTO-ENTMCNC: 31.3 G/DL (ref 30–36.5)
MCV RBC AUTO: 82.5 FL (ref 80–99)
NRBC # BLD: 0.06 K/UL (ref 0–0.01)
NRBC BLD-RTO: 0.8 PER 100 WBC
PLATELET # BLD AUTO: 328 K/UL (ref 150–400)
PMV BLD AUTO: 9.3 FL (ref 8.9–12.9)
POTASSIUM SERPL-SCNC: 4.3 MMOL/L (ref 3.5–5.1)
RBC # BLD AUTO: 3.37 M/UL (ref 3.8–5.2)
SODIUM SERPL-SCNC: 132 MMOL/L (ref 136–145)
TSH SERPL DL<=0.05 MIU/L-ACNC: 4.37 UIU/ML (ref 0.36–3.74)
WBC # BLD AUTO: 7.4 K/UL (ref 3.6–11)

## 2022-01-28 PROCEDURE — 97530 THERAPEUTIC ACTIVITIES: CPT

## 2022-01-28 PROCEDURE — 74011250637 HC RX REV CODE- 250/637: Performed by: HOSPITALIST

## 2022-01-28 PROCEDURE — 80048 BASIC METABOLIC PNL TOTAL CA: CPT

## 2022-01-28 PROCEDURE — 74011250636 HC RX REV CODE- 250/636: Performed by: NEUROLOGICAL SURGERY

## 2022-01-28 PROCEDURE — 74011250637 HC RX REV CODE- 250/637: Performed by: FAMILY MEDICINE

## 2022-01-28 PROCEDURE — 84443 ASSAY THYROID STIM HORMONE: CPT

## 2022-01-28 PROCEDURE — 74011250637 HC RX REV CODE- 250/637: Performed by: PHYSICIAN ASSISTANT

## 2022-01-28 PROCEDURE — 36415 COLL VENOUS BLD VENIPUNCTURE: CPT

## 2022-01-28 PROCEDURE — 83735 ASSAY OF MAGNESIUM: CPT

## 2022-01-28 PROCEDURE — 65660000000 HC RM CCU STEPDOWN

## 2022-01-28 PROCEDURE — 97116 GAIT TRAINING THERAPY: CPT

## 2022-01-28 PROCEDURE — 74011250637 HC RX REV CODE- 250/637: Performed by: SPECIALIST

## 2022-01-28 PROCEDURE — 74011000250 HC RX REV CODE- 250: Performed by: SPECIALIST

## 2022-01-28 PROCEDURE — 74011000250 HC RX REV CODE- 250: Performed by: STUDENT IN AN ORGANIZED HEALTH CARE EDUCATION/TRAINING PROGRAM

## 2022-01-28 PROCEDURE — 85027 COMPLETE CBC AUTOMATED: CPT

## 2022-01-28 RX ORDER — DIPHENHYDRAMINE HCL 25 MG
25 CAPSULE ORAL
Status: DISCONTINUED | OUTPATIENT
Start: 2022-01-28 | End: 2022-02-01 | Stop reason: HOSPADM

## 2022-01-28 RX ORDER — HYDROMORPHONE HYDROCHLORIDE 2 MG/1
4 TABLET ORAL
Status: DISCONTINUED | OUTPATIENT
Start: 2022-01-28 | End: 2022-02-01 | Stop reason: HOSPADM

## 2022-01-28 RX ADMIN — SODIUM CHLORIDE, PRESERVATIVE FREE 10 ML: 5 INJECTION INTRAVENOUS at 22:00

## 2022-01-28 RX ADMIN — HYDROMORPHONE HYDROCHLORIDE 4 MG: 2 TABLET ORAL at 21:08

## 2022-01-28 RX ADMIN — HYDROMORPHONE HYDROCHLORIDE 3 MG: 2 TABLET ORAL at 09:55

## 2022-01-28 RX ADMIN — SODIUM CHLORIDE, PRESERVATIVE FREE 10 ML: 5 INJECTION INTRAVENOUS at 05:32

## 2022-01-28 RX ADMIN — BACLOFEN 10 MG: 10 TABLET ORAL at 09:55

## 2022-01-28 RX ADMIN — LACTULOSE 30 ML: 20 SOLUTION ORAL at 18:01

## 2022-01-28 RX ADMIN — METOPROLOL TARTRATE 50 MG: 25 TABLET, FILM COATED ORAL at 21:07

## 2022-01-28 RX ADMIN — HYDROMORPHONE HYDROCHLORIDE 4 MG: 2 TABLET ORAL at 15:32

## 2022-01-28 RX ADMIN — SODIUM CHLORIDE, PRESERVATIVE FREE 10 ML: 5 INJECTION INTRAVENOUS at 13:32

## 2022-01-28 RX ADMIN — DIPHENHYDRAMINE HYDROCHLORIDE 25 MG: 25 CAPSULE ORAL at 21:07

## 2022-01-28 RX ADMIN — HYDROMORPHONE HYDROCHLORIDE 3 MG: 2 TABLET ORAL at 02:38

## 2022-01-28 RX ADMIN — METOPROLOL TARTRATE 50 MG: 25 TABLET, FILM COATED ORAL at 09:55

## 2022-01-28 RX ADMIN — SODIUM CHLORIDE, PRESERVATIVE FREE 10 ML: 5 INJECTION INTRAVENOUS at 13:31

## 2022-01-28 RX ADMIN — ACETAMINOPHEN 650 MG: 325 TABLET ORAL at 00:00

## 2022-01-28 RX ADMIN — LACTULOSE 15 ML: 20 SOLUTION ORAL at 09:56

## 2022-01-28 RX ADMIN — BACLOFEN 10 MG: 10 TABLET ORAL at 21:07

## 2022-01-28 RX ADMIN — POLYETHYLENE GLYCOL 3350 17 G: 17 POWDER, FOR SOLUTION ORAL at 09:56

## 2022-01-28 RX ADMIN — HYDROMORPHONE HYDROCHLORIDE 3 MG: 2 TABLET ORAL at 05:31

## 2022-01-28 RX ADMIN — HYDROMORPHONE HYDROCHLORIDE 4 MG: 2 TABLET ORAL at 18:01

## 2022-01-28 RX ADMIN — SENNOSIDES AND DOCUSATE SODIUM 2 TABLET: 8.6; 5 TABLET ORAL at 09:56

## 2022-01-28 RX ADMIN — ACETAMINOPHEN 650 MG: 325 TABLET ORAL at 12:21

## 2022-01-28 RX ADMIN — BACLOFEN 10 MG: 10 TABLET ORAL at 15:32

## 2022-01-28 RX ADMIN — HYDROMORPHONE HYDROCHLORIDE 3 MG: 2 TABLET ORAL at 12:21

## 2022-01-28 RX ADMIN — SENNOSIDES AND DOCUSATE SODIUM 2 TABLET: 8.6; 5 TABLET ORAL at 18:00

## 2022-01-28 RX ADMIN — GABAPENTIN 400 MG: 400 CAPSULE ORAL at 09:55

## 2022-01-28 RX ADMIN — GABAPENTIN 400 MG: 400 CAPSULE ORAL at 21:07

## 2022-01-28 RX ADMIN — GABAPENTIN 400 MG: 400 CAPSULE ORAL at 15:32

## 2022-01-28 RX ADMIN — ACETAMINOPHEN 650 MG: 325 TABLET ORAL at 05:31

## 2022-01-28 RX ADMIN — ACETAMINOPHEN 650 MG: 325 TABLET ORAL at 18:01

## 2022-01-28 RX ADMIN — ENOXAPARIN SODIUM 40 MG: 100 INJECTION SUBCUTANEOUS at 12:22

## 2022-01-28 NOTE — PROGRESS NOTES
Problem: Self Care Deficits Care Plan (Adult)  Goal: *Acute Goals and Plan of Care (Insert Text)  Description: FUNCTIONAL STATUS PRIOR TO ADMISSION: At baseline, patient was independent with ADLs, IADLs, and functional mobility/ ambulatory without AD. Was ambulatory with a RW for ~1 month PTA, then was unable to walk for a few days prior to admission. Endorses no falls. HOME SUPPORT: Patient resided with her 10 y/o son and did not require assistance at baseline. Is anticipating staying at her mother's house when she returns home. Occupational Therapy Goals  Updated 1/27/2022  1. Patient will perform upper body dressing with supervision/set-up within 7 day(s). 2.  Patient will perform lower body dressing using AE with moderate assistance  within 7 day(s). 3.  Patient will perform stand-pivot transfer to [facility] with 7 day(s). 4.  Patient will perform all aspects of toileting with moderate assistance  within 7 day(s). 5.  Patient will participate in upper extremity therapeutic exercise/activities with supervision/set-up for 10 minutes within 7 day(s). 6.  Patient will don/doff back brace at supervision/set-up within 7 days. 7.  Patient will verbalize/demonstrate 3/3 back precautions during ADL tasks without cues within 7 days. Initiated 1/20/2022  1. Patient will perform upper body dressing with supervision/set-up within 7 day(s). 2.  Patient will perform lower body dressing using AE with moderate assistance  within 7 day(s). 3.  Patient will perform sliding board transfer to [facility] with moderate assistance  within 7 day(s). 4.  Patient will perform all aspects of toileting with moderate assistance  within 7 day(s). 5.  Patient will participate in upper extremity therapeutic exercise/activities with supervision/set-up for 10 minutes within 7 day(s). 6.  Patient will don/doff back brace at supervision/set-up within 7 days.   7.  Patient will verbalize/demonstrate 3/3 back precautions during ADL tasks without cues within 7 days. Outcome: Progressing Towards Goal     OCCUPATIONAL THERAPY TREATMENT  Patient: Darius Lamas (89 y.o. female)  Date: 1/28/2022  Diagnosis: Cord compression syndrome ([facility] [G95.20] <principal problem not specified>  Procedure(s) (LRB):  T11 - L2 LAMINECTOMY AND FUSION WITH O-ARM (N/A) 9 Days Post-Op  Precautions: Fall,Back  Chart, occupational therapy assessment, plan of care, and goals were reviewed. ASSESSMENT  Patient continues with skilled OT services and is progressing towards goals. Patient remains limited by BLE weakness (improving), tachycardia with activity (up to 184 today), impaired functional reach for LB ADLs, and impaired sitting/ standing balance. Patient progressed to don TLSO with set-up and verbal cues today. Also progressed to ambulate using RW with minimum assistance x2 today (2 trials, 5' each, see PT note). Mobility performed in preparation for ADLs (ambulating to bathroom, standing for ADLs, etc). Patient remains significantly below functional baseline, participates well in therapy sessions, and is motivated to progress. She is an excellent candidate for inpatient rehab at d/. Current Level of Function Impacting Discharge (ADLs/self-care): Required minimum assistance for rolling, moderate assistance supine-sit/ logrolling, moderate assistance for sit-stand, and minimum assistance x2 ambulating ~5' with RW. Infer overall patient requires set-up to moderate assistance UB ADLs and total assistance LB ADLs         PLAN :  Patient continues to benefit from skilled intervention to address the above impairments. Continue treatment per established plan of care to address goals.     Recommendation for discharge: (in order for the patient to meet his/her long term goals)  Therapy 3 hours per day 5-7 days per week    This discharge recommendation:  Has been made in collaboration with the attending provider and/or case management         SUBJECTIVE: Patient stated It's exerting.     OBJECTIVE DATA SUMMARY:       Functional Mobility and Transfers for ADLs:  Bed Mobility:  Rolling: Minimum assistance (assist for positioning LEs, log roll technique)  Supine to Sit: Moderate assistance (L side to sit, assist for LEs>trunk)  Scooting: Stand-by assistance (for scoot to EOB, scooting back in chair)    Transfers:  Sit to Stand: Moderate assistance          Balance:  Sitting: Impaired  Sitting - Static: Good (unsupported)  Sitting - Dynamic: Fair (occasional); Good (unsupported)  Standing: Impaired; With support  Standing - Static: Fair;Constant support (RW)  Standing - Dynamic : Poor;Constant support    Pain:  Patient reported surgical pain with mobility, improving with rest/ repositioning     Activity Tolerance: Tachycardic, otherwise good    After treatment patient left in no apparent distress:   Sitting in chair, Call bell within reach, and Bed / chair alarm activated    COMMUNICATION/COLLABORATION:   The patients plan of care was discussed with: Physical therapist and Registered nurse.      Viridiana Ballard OT  Time Calculation: 32 mins

## 2022-01-28 NOTE — PROGRESS NOTES
Problem: Mobility Impaired (Adult and Pediatric)  Goal: *Acute Goals and Plan of Care (Insert Text)  Description: FUNCTIONAL STATUS PRIOR TO ADMISSION: Patient was independent and active without use of DME. She has history of breast cancer with mets to the spine. HOME SUPPORT PRIOR TO ADMISSION: Pt lives with her 9year old son. Her mother is also involved in her care. Physical Therapy Goals    Reassessed on 1/24/2022  1. Patient will move from supine to sit and sit to supine , scoot up and down, and roll side to side in bed with moderate assistance  within 7 days. 2. Patient will perform sit to stand with maximal assistance within 7 days. 3. Patient will ambulate with maximal assistance for 5 feet with the least restrictive device within 7 days. 4. Patient will verbalize and demonstrate understanding of spinal precautions (No bending, lifting greater than 5 lbs, or twisting; log-roll technique; frequent repositioning as instructed) within 7 days. Initiated 1/20/2022  1. Patient will move from supine to sit and sit to supine , scoot up and down, and roll side to side in bed with moderate assistance  within 4 days. 2. Patient will perform sit to stand with maximal assistance within 4 days. 3. Patient will ambulate with maximal assistance for 5 feet with the least restrictive device within 4 days. 4. Patient will verbalize and demonstrate understanding of spinal precautions (No bending, lifting greater than 5 lbs, or twisting; log-roll technique; frequent repositioning as instructed) within 4 days.         Outcome: Progressing Towards Goal   PHYSICAL THERAPY TREATMENT  Patient: Milton Huerta (78 y.o. female)  Date: 1/28/2022  Diagnosis: Cord compression syndrome (HCC) [G95.20] <principal problem not specified>  Procedure(s) (LRB):  T11 - L2 LAMINECTOMY AND FUSION WITH O-ARM (N/A) 9 Days Post-Op  Precautions: Fall,Back  Chart, physical therapy assessment, plan of care and goals were reviewed. ASSESSMENT  Patient continues with skilled PT services and is progressing towards goals. Barriers to indep with functional mobility include bilat LE weakness, impaired balance, decreased activity tolerance, gait dysfunction, increased risk for fall. Pt cleared by physician to progress activity with elevated HR. Demo good awareness of spinal precautions. Pt presents with HR 120s at rest, up to 180s with gait training. Tolerated EOB activity and gait training with RW for 5' x 2, min A x 2 for balance/ wt shift/ walker mgmt. Pt demo decreased floor clearance R>L foot, soft knees in midstance, but no buckling; chair brought up behind pt at end of each trial to allow for seated rest. Remained in chair for lunch at end of session. Pt remains below functional baseline with good rehab potential. Recommend follow up IPR at d/c as pt will require intensive multidisciplinary therapy environment to facilitate return to max level of functional independence. Current Level of Function Impacting Discharge (mobility/balance): bed mob mod A, sit to stand mod A, short distance amb with RW min A x 2    Other factors to consider for discharge: good family support, indep baseline, CA diagnosis         PLAN :  Patient continues to benefit from skilled intervention to address the above impairments. Continue treatment per established plan of care. to address goals.     For for discharge: (in order for the patient to meet his/her long term goals)  Therapy 3 hours per day 5-7 days per week    This discharge recommendation:  Has been made in collaboration with the attending provider and/or case management    IF patient discharges home will need the following DME: to be determined (TBD)       SUBJECTIVE:   Patient reports intermittent muscle spasms LEs    OBJECTIVE DATA SUMMARY:   Critical Behavior:  Neurologic State: Alert  Orientation Level: Oriented X4  Cognition: Follows commands  Safety/Judgement: Insight into deficits  Functional Mobility Training:  Bed Mobility:  Rolling: Minimum assistance (assist for positioning LEs, log roll technique)  Supine to Sit: Moderate assistance (L side to sit, assist for LEs>trunk)     Scooting: Stand-by assistance (for scoot to EOB, scooting back in chair)        Transfers:  Sit to Stand: Moderate assistance  Stand to Sit: Minimum assistance (decreased eccentric control)                             Balance:  Sitting: Impaired  Sitting - Static: Good (unsupported)  Sitting - Dynamic: Fair (occasional); Good (unsupported)  Standing: Impaired; With support  Standing - Static: Fair;Constant support (RW)  Standing - Dynamic : Poor;Constant support  Ambulation/Gait Training:  Distance (ft): 5 Feet (ft) (x 2)  Assistive Device: Brace/Splint;Gait belt;Walker, rolling  Ambulation - Level of Assistance: Minimal assistance;Assist x2        Gait Abnormalities: Decreased step clearance (increased UE support on walker)        Base of Support: Narrowed     Speed/Deedee: Slow;Shuffled  Step Length: Left shortened;Right shortened  Swing Pattern: Right asymmetrical     Pain Rating:  Pt reports R>L LE 5-6/10 with intermittent spasms    Activity Tolerance:   Good, requires rest breaks, observed SOB with activity, and HR 120s at rest, 180s with activity (cleared to participate by physician)    After treatment patient left in no apparent distress:   Sitting in chair, Call bell within reach, and Bed / chair alarm activated    COMMUNICATION/COLLABORATION:   The patients plan of care was discussed with: Occupational therapist and Registered nurse.      Gabo Bullock, PT   Time Calculation: 27 mins

## 2022-01-28 NOTE — PROGRESS NOTES
Pt continues to do well considering. In good spirits today. She is progressing slowly with PT/OT. Continued intermittent severe pain requiring narcotic medication. Incision is C/D/I w staples  OK to discharge to [facility] from [facility] when insurance auth granted  Staples need to be removed on 2/2/2022  Follow up appointments on chart.

## 2022-01-28 NOTE — PROGRESS NOTES
Hospitalist Progress Note      Hospital summary: 33 y.o lady w/ metastatic breast cancer who presented as a transfer from [facility] for management of cord compression from spine mets. Assessment/Plan:  Cord compression in the setting of metastatic breast cancer:  -s/p T11-L3 laminectomy on 1/19  -dexamethasone - completed course  -PT/OT as able  -neurosurgery and oncology consulted  -f/u pathology    Bilateral leg pain: some of this seems to be neuropathic  -continue fentanyl patch - increased dose to 75mcg/hr, PRN Dilaudid; IV stopped   -continue gabapentin - increase dose to 400 mg TID  -continue bowel regimen    Sinus tachycardia: asymptomatic; monitor. History of SVT on metoprolol. Code status: full  DVT prophylaxis: sq Lovenox - continue until 2/2/22 per oncology  Disposition: rehab when medically appropriate  ----------------------------------------------    CC: bilateral leg pain    S: gets significant b/l leg pain w/ activity, otherwise it's controlled. No n/v/d, dyspnea. Adjusted pain meds, ordered venous dopplers    Review of Systems:  Pertinent items are noted in HPI.     O:  Visit Vitals  /85   Pulse (!) 106   Temp 98.7 °F (37.1 °C)   Resp 14   Ht 5' 2\" (1.575 m)   Wt 93.3 kg (205 lb 11 oz)   SpO2 98%   BMI 37.62 kg/m²     Patient Vitals for the past 24 hrs:   Temp Pulse Resp BP SpO2   01/27/22 2210 -- (!) 106 -- -- --   01/27/22 2200 98.7 °F (37.1 °C) (!) 109 14 121/85 98 %   01/27/22 2012 -- (!) 117 -- -- --   01/27/22 1809 -- (!) 124 -- -- --   01/27/22 1756 99.3 °F (37.4 °C) (!) 121 15 (!) 146/88 98 %   01/27/22 1426 98 °F (36.7 °C) (!) 115 15 130/86 94 %   01/27/22 1400 -- (!) 121 -- -- --   01/27/22 1200 -- (!) 105 -- -- --   01/27/22 1000 -- (!) 111 -- -- --   01/27/22 0959 98.1 °F (36.7 °C) (!) 111 16 131/86 98 %   01/27/22 0847 -- (!) 126 -- (!) 146/90 --   01/27/22 0610 97.5 °F (36.4 °C) (!) 114 17 128/86 100 %   01/27/22 0202 97.8 °F (36.6 °C) (!) 120 17 119/81 99 %       PHYSICAL EXAM:  Gen: NAD, non-toxic, obese  HEENT: anicteric sclerae, normal conjunctiva  Neck: supple, trachea midline, no adenopathy  Heart: RRR, no MRG, no JVD, no peripheral edema  Lungs: CTA b/l, non-labored respirations  Abd: soft, NT, ND, BS+  Extr: warm  Skin: dry, no rash  Neuro: CN II-XII grossly intact, normal speech, minimal b/l LE movement 2/5 (unchanged)  Psych: normal mood, appropriate affect    No intake or output data in the 24 hours ending 01/27/22 9498     Recent labs & imaging reviewed:  No results found for this or any previous visit (from the past 24 hour(s)). No results for input(s): WBC, HGB, HCT, PLT, HGBEXT, HCTEXT, PLTEXT, HGBEXT, HCTEXT, PLTEXT in the last 72 hours. No results for input(s): NA, K, CL, CO2, BUN, CREA, GLU, CA, MG, PHOS, URICA in the last 72 hours. No results for input(s): ALT, AP, TBIL, TBILI, TP, ALB, GLOB, GGT, AML, LPSE in the last 72 hours. No lab exists for component: SGOT, GPT, AMYP, HLPSE  No results for input(s): INR, PTP, APTT, INREXT, INREXT in the last 72 hours. No results for input(s): FE, TIBC, PSAT, FERR in the last 72 hours. No results found for: FOL, RBCF   No results for input(s): PH, PCO2, PO2 in the last 72 hours. No results for input(s): CPK, CKNDX, TROIQ in the last 72 hours.     No lab exists for component: CPKMB  No results found for: CHOL, CHOLX, CHLST, CHOLV, HDL, HDLP, LDL, LDLC, DLDLP, TGLX, TRIGL, TRIGP, CHHD, CHHDX  No results found for: GLUCPOC  No results found for: COLOR, APPRN, SPGRU, REFSG, ROMERO, PROTU, GLUCU, KETU, BILU, UROU, SANAZ, LEUKU, GLUKE, EPSU, BACTU, WBCU, RBCU, CASTS, UCRY    Med list reviewed  Current Facility-Administered Medications   Medication Dose Route Frequency    metoprolol tartrate (LOPRESSOR) tablet 50 mg  50 mg Oral Q12H    senna-docusate (PERICOLACE) 8.6-50 mg per tablet 2 Tablet  2 Tablet Oral BID    lactulose (CHRONULAC) 10 gram/15 mL solution 15 mL  15 mL Oral BID    HYDROmorphone (DILAUDID) tablet 3 mg  3 mg Oral Q3H PRN    fentaNYL (DURAGESIC) 75 mcg/hr patch 1 Patch  1 Patch TransDERmal Q72H    gabapentin (NEURONTIN) capsule 400 mg  400 mg Oral TID    baclofen (LIORESAL) tablet 10 mg  10 mg Oral TID    enoxaparin (LOVENOX) injection 40 mg  40 mg SubCUTAneous Q24H    bisacodyL (DULCOLAX) suppository 10 mg  10 mg Rectal DAILY PRN    polyethylene glycol (MIRALAX) packet 17 g  17 g Oral DAILY    sodium chloride (NS) flush 5-40 mL  5-40 mL IntraVENous Q8H    sodium chloride (NS) flush 5-40 mL  5-40 mL IntraVENous PRN    acetaminophen (TYLENOL) tablet 650 mg  650 mg Oral Q6H PRN    Or    acetaminophen (TYLENOL) suppository 650 mg  650 mg Rectal Q6H PRN    ondansetron (ZOFRAN ODT) tablet 4 mg  4 mg Oral Q8H PRN    Or    ondansetron (ZOFRAN) injection 4 mg  4 mg IntraVENous Q6H PRN    0.9% sodium chloride infusion 250 mL  250 mL IntraVENous PRN    sodium chloride (NS) flush 5-40 mL  5-40 mL IntraVENous Q8H    sodium chloride (NS) flush 5-40 mL  5-40 mL IntraVENous PRN    acetaminophen (TYLENOL) tablet 650 mg  650 mg Oral Q6H    naloxone (NARCAN) injection 0.4 mg  0.4 mg IntraVENous PRN    diphenhydrAMINE (BENADRYL) capsule 25 mg  25 mg Oral Q6H PRN    phenol throat spray (CHLORASEPTIC) 1 Spray  1 Spray Oral PRN    benzocaine-menthoL (CEPACOL) lozenge 1 Lozenge  1 Lozenge Oral PRN       Care Plan discussed with:  Patient/Family, Nurse and     Cristy Rm MD  Internal Medicine  Date of Service: 1/27/2022

## 2022-01-29 LAB
ALBUMIN SERPL-MCNC: 2.6 G/DL (ref 3.5–5)
ALBUMIN/GLOB SERPL: 0.6 {RATIO} (ref 1.1–2.2)
ALP SERPL-CCNC: 130 U/L (ref 45–117)
ALT SERPL-CCNC: 51 U/L (ref 12–78)
AST SERPL-CCNC: 69 U/L (ref 15–37)
BILIRUB DIRECT SERPL-MCNC: <0.1 MG/DL (ref 0–0.2)
BILIRUB SERPL-MCNC: 0.2 MG/DL (ref 0.2–1)
GLOBULIN SER CALC-MCNC: 4.2 G/DL (ref 2–4)
PROT SERPL-MCNC: 6.8 G/DL (ref 6.4–8.2)

## 2022-01-29 PROCEDURE — 97535 SELF CARE MNGMENT TRAINING: CPT

## 2022-01-29 PROCEDURE — 74011000250 HC RX REV CODE- 250: Performed by: STUDENT IN AN ORGANIZED HEALTH CARE EDUCATION/TRAINING PROGRAM

## 2022-01-29 PROCEDURE — 74011000250 HC RX REV CODE- 250: Performed by: SPECIALIST

## 2022-01-29 PROCEDURE — 74011250637 HC RX REV CODE- 250/637: Performed by: PHYSICIAN ASSISTANT

## 2022-01-29 PROCEDURE — 65660000000 HC RM CCU STEPDOWN

## 2022-01-29 PROCEDURE — 80076 HEPATIC FUNCTION PANEL: CPT

## 2022-01-29 PROCEDURE — 74011250637 HC RX REV CODE- 250/637: Performed by: FAMILY MEDICINE

## 2022-01-29 PROCEDURE — 74011250637 HC RX REV CODE- 250/637: Performed by: HOSPITALIST

## 2022-01-29 PROCEDURE — 97530 THERAPEUTIC ACTIVITIES: CPT

## 2022-01-29 PROCEDURE — 74011250636 HC RX REV CODE- 250/636: Performed by: NEUROLOGICAL SURGERY

## 2022-01-29 PROCEDURE — 36415 COLL VENOUS BLD VENIPUNCTURE: CPT

## 2022-01-29 PROCEDURE — 74011250637 HC RX REV CODE- 250/637: Performed by: SPECIALIST

## 2022-01-29 PROCEDURE — 74011250637 HC RX REV CODE- 250/637: Performed by: STUDENT IN AN ORGANIZED HEALTH CARE EDUCATION/TRAINING PROGRAM

## 2022-01-29 RX ORDER — BACLOFEN 10 MG/1
10 TABLET ORAL
Status: DISCONTINUED | OUTPATIENT
Start: 2022-01-29 | End: 2022-02-01 | Stop reason: HOSPADM

## 2022-01-29 RX ADMIN — HYDROMORPHONE HYDROCHLORIDE 4 MG: 2 TABLET ORAL at 16:43

## 2022-01-29 RX ADMIN — HYDROMORPHONE HYDROCHLORIDE 4 MG: 2 TABLET ORAL at 21:16

## 2022-01-29 RX ADMIN — GABAPENTIN 400 MG: 400 CAPSULE ORAL at 09:43

## 2022-01-29 RX ADMIN — HYDROMORPHONE HYDROCHLORIDE 4 MG: 2 TABLET ORAL at 12:36

## 2022-01-29 RX ADMIN — BACLOFEN 10 MG: 10 TABLET ORAL at 09:43

## 2022-01-29 RX ADMIN — SODIUM CHLORIDE, PRESERVATIVE FREE 10 ML: 5 INJECTION INTRAVENOUS at 06:44

## 2022-01-29 RX ADMIN — BACLOFEN 10 MG: 10 TABLET ORAL at 16:44

## 2022-01-29 RX ADMIN — SENNOSIDES AND DOCUSATE SODIUM 2 TABLET: 8.6; 5 TABLET ORAL at 09:44

## 2022-01-29 RX ADMIN — DIPHENHYDRAMINE HYDROCHLORIDE 25 MG: 25 CAPSULE ORAL at 21:16

## 2022-01-29 RX ADMIN — ACETAMINOPHEN 650 MG: 325 TABLET ORAL at 00:22

## 2022-01-29 RX ADMIN — HYDROMORPHONE HYDROCHLORIDE 4 MG: 2 TABLET ORAL at 09:43

## 2022-01-29 RX ADMIN — ACETAMINOPHEN 650 MG: 325 TABLET ORAL at 12:35

## 2022-01-29 RX ADMIN — HYDROMORPHONE HYDROCHLORIDE 4 MG: 2 TABLET ORAL at 03:12

## 2022-01-29 RX ADMIN — HYDROMORPHONE HYDROCHLORIDE 4 MG: 2 TABLET ORAL at 06:31

## 2022-01-29 RX ADMIN — METOPROLOL TARTRATE 50 MG: 25 TABLET, FILM COATED ORAL at 21:16

## 2022-01-29 RX ADMIN — SODIUM CHLORIDE, PRESERVATIVE FREE 10 ML: 5 INJECTION INTRAVENOUS at 21:17

## 2022-01-29 RX ADMIN — ENOXAPARIN SODIUM 40 MG: 100 INJECTION SUBCUTANEOUS at 12:35

## 2022-01-29 RX ADMIN — GABAPENTIN 400 MG: 400 CAPSULE ORAL at 16:44

## 2022-01-29 RX ADMIN — GABAPENTIN 400 MG: 400 CAPSULE ORAL at 21:16

## 2022-01-29 RX ADMIN — BACLOFEN 10 MG: 10 TABLET ORAL at 21:16

## 2022-01-29 RX ADMIN — SODIUM CHLORIDE, PRESERVATIVE FREE 10 ML: 5 INJECTION INTRAVENOUS at 06:43

## 2022-01-29 RX ADMIN — ACETAMINOPHEN 650 MG: 325 TABLET ORAL at 16:43

## 2022-01-29 RX ADMIN — HYDROMORPHONE HYDROCHLORIDE 4 MG: 2 TABLET ORAL at 00:22

## 2022-01-29 RX ADMIN — ACETAMINOPHEN 650 MG: 325 TABLET ORAL at 06:31

## 2022-01-29 RX ADMIN — METOPROLOL TARTRATE 50 MG: 25 TABLET, FILM COATED ORAL at 09:43

## 2022-01-29 RX ADMIN — POLYETHYLENE GLYCOL 3350 17 G: 17 POWDER, FOR SOLUTION ORAL at 09:44

## 2022-01-29 NOTE — PROGRESS NOTES
Hospitalist Progress Note      Hospital summary: 33 y.o lady w/ metastatic breast cancer who presented as a transfer from [facility] for management of cord compression from spine mets. Assessment/Plan:  Cord compression in the setting of metastatic breast cancer:  -s/p T11-L3 laminectomy on 1/19  -dexamethasone - completed course  -PT/OT as able  -neurosurgery and oncology consulted  -f/u pathology    Bilateral leg pain: some of this seems to be neuropathic  -continue fentanyl patch - increased dose to 75mcg/hr, dilaudid dose increased  -continue gabapentin - increased dose to 400 mg TID    Sinus tachycardia: improved  Likely due to pain and cancer-  asymptomatic; monitor. History of SVT on metoprolol  Metoprolol dose increased    Constipation due to narcotics- added stool softeners increased lactulose dose. Psych- screened for depression- offered meds and or psych consult- patient declined  Insomnia- improved on benadryl    Code status: full  DVT prophylaxis: sq Lovenox - continue until 2/2/22 per oncology  Disposition: rehab when medically appropriate  ----------------------------------------------    CC: bilateral leg pain    S: gets significant b/l leg pain w/ activity, otherwise it's controlled. No n/v/d, dyspnea. Better pain control, still constipation    Review of Systems:  Pertinent items are noted in HPI.     O:  Visit Vitals  BP (!) 131/92   Pulse (!) 123   Temp 98.2 °F (36.8 °C)   Resp 15   Ht 5' 2\" (1.575 m)   Wt 98 kg (216 lb 0.8 oz)   SpO2 95%   BMI 39.52 kg/m²     Patient Vitals for the past 24 hrs:   Temp Pulse Resp BP SpO2   01/29/22 1809 -- (!) 123 -- -- --   01/29/22 1420 -- (!) 113 -- -- --   01/29/22 1228 -- (!) 109 -- -- --   01/29/22 1024 -- (!) 113 -- -- --   01/29/22 0600 98.2 °F (36.8 °C) (!) 109 15 (!) 131/92 95 %   01/29/22 0200 97.8 °F (36.6 °C) (!) 108 14 (!) 136/93 98 %   01/28/22 2200 98.2 °F (36.8 °C) (!) 120 19 (!) 146/105 95 %       PHYSICAL EXAM:  Gen: NAD, awake and alert, pleasant on exam  HEENT: anicteric sclerae, normal conjunctiva  Neck: supple, trachea midline, no adenopathy  Heart: tachycardia, no Murmurs  Lungs: CTA b/l,  Abd: soft, NT, ND, BS+  Extr: warm  Skin: dry, no rash  Neuro: CN II-XII grossly intact, normal speech, minimal b/l LE movement 2/5 (unchanged)  Psych: normal mood, appropriate affect    No intake or output data in the 24 hours ending 01/29/22 1812     Recent labs & imaging reviewed:  Recent Results (from the past 24 hour(s))   HEPATIC FUNCTION PANEL    Collection Time: 01/29/22  4:10 AM   Result Value Ref Range    Protein, total 6.8 6.4 - 8.2 g/dL    Albumin 2.6 (L) 3.5 - 5.0 g/dL    Globulin 4.2 (H) 2.0 - 4.0 g/dL    A-G Ratio 0.6 (L) 1.1 - 2.2      Bilirubin, total 0.2 0.2 - 1.0 MG/DL    Bilirubin, direct <0.1 0.0 - 0.2 MG/DL    Alk. phosphatase 130 (H) 45 - 117 U/L    AST (SGOT) 69 (H) 15 - 37 U/L    ALT (SGPT) 51 12 - 78 U/L     Recent Labs     01/28/22  0519   WBC 7.4   HGB 8.7*   HCT 27.8*        Recent Labs     01/28/22  0519   *   K 4.3      CO2 27   BUN 13   CREA 0.44*   GLU 94   CA 10.5*   MG 2.0     Recent Labs     01/29/22  0410   ALT 51   *   TBILI 0.2   TP 6.8   ALB 2.6*   GLOB 4.2*     No results for input(s): INR, PTP, APTT, INREXT, INREXT in the last 72 hours. No results for input(s): FE, TIBC, PSAT, FERR in the last 72 hours. No results found for: FOL, RBCF   No results for input(s): PH, PCO2, PO2 in the last 72 hours. No results for input(s): CPK, CKNDX, TROIQ in the last 72 hours.     No lab exists for component: CPKMB  No results found for: CHOL, CHOLX, CHLST, CHOLV, HDL, HDLP, LDL, LDLC, DLDLP, TGLX, TRIGL, TRIGP, CHHD, CHHDX  No results found for: GLUCPOC  No results found for: COLOR, APPRN, SPGRU, REFSG, ROMERO, PROTU, GLUCU, KETU, BILU, UROU, SANAZ, LEUKU, GLUKE, EPSU, BACTU, WBCU, RBCU, CASTS, UCRY    Med list reviewed  Current Facility-Administered Medications   Medication Dose Route Frequency    baclofen (LIORESAL) tablet 10 mg  10 mg Oral QID PRN    HYDROmorphone (DILAUDID) tablet 4 mg  4 mg Oral Q3H PRN    lactulose (CHRONULAC) 10 gram/15 mL solution 30 mL  30 mL Oral BID    diphenhydrAMINE (BENADRYL) capsule 25 mg  25 mg Oral QHS    diphenhydrAMINE (BENADRYL) capsule 25 mg  25 mg Oral QHS PRN    metoprolol tartrate (LOPRESSOR) tablet 50 mg  50 mg Oral Q12H    senna-docusate (PERICOLACE) 8.6-50 mg per tablet 2 Tablet  2 Tablet Oral BID    fentaNYL (DURAGESIC) 75 mcg/hr patch 1 Patch  1 Patch TransDERmal Q72H    gabapentin (NEURONTIN) capsule 400 mg  400 mg Oral TID    enoxaparin (LOVENOX) injection 40 mg  40 mg SubCUTAneous Q24H    bisacodyL (DULCOLAX) suppository 10 mg  10 mg Rectal DAILY PRN    polyethylene glycol (MIRALAX) packet 17 g  17 g Oral DAILY    sodium chloride (NS) flush 5-40 mL  5-40 mL IntraVENous Q8H    sodium chloride (NS) flush 5-40 mL  5-40 mL IntraVENous PRN    acetaminophen (TYLENOL) tablet 650 mg  650 mg Oral Q6H PRN    Or    acetaminophen (TYLENOL) suppository 650 mg  650 mg Rectal Q6H PRN    ondansetron (ZOFRAN ODT) tablet 4 mg  4 mg Oral Q8H PRN    Or    ondansetron (ZOFRAN) injection 4 mg  4 mg IntraVENous Q6H PRN    0.9% sodium chloride infusion 250 mL  250 mL IntraVENous PRN    sodium chloride (NS) flush 5-40 mL  5-40 mL IntraVENous Q8H    sodium chloride (NS) flush 5-40 mL  5-40 mL IntraVENous PRN    acetaminophen (TYLENOL) tablet 650 mg  650 mg Oral Q6H    naloxone (NARCAN) injection 0.4 mg  0.4 mg IntraVENous PRN    diphenhydrAMINE (BENADRYL) capsule 25 mg  25 mg Oral Q6H PRN    phenol throat spray (CHLORASEPTIC) 1 Spray  1 Spray Oral PRN    benzocaine-menthoL (CEPACOL) lozenge 1 Lozenge  1 Lozenge Oral PRN       Care Plan discussed with:  Patient/Family, Nurse and     Uriel Rai MD  Internal Medicine  Date of Service: 1/29/2022

## 2022-01-29 NOTE — PROGRESS NOTES
Problem: Self Care Deficits Care Plan (Adult)  Goal: *Acute Goals and Plan of Care (Insert Text)  Description: FUNCTIONAL STATUS PRIOR TO ADMISSION: At baseline, patient was independent with ADLs, IADLs, and functional mobility/ ambulatory without AD. Was ambulatory with a RW for ~1 month PTA, then was unable to walk for a few days prior to admission. Endorses no falls. HOME SUPPORT: Patient resided with her 10 y/o son and did not require assistance at baseline. Is anticipating staying at her mother's house when she returns home. Occupational Therapy Goals  Updated 1/27/2022  1. Patient will perform upper body dressing with supervision/set-up within 7 day(s). 2.  Patient will perform lower body dressing using AE with moderate assistance  within 7 day(s). 3.  Patient will perform stand-pivot transfer to [facility] with 7 day(s). 4.  Patient will perform all aspects of toileting with moderate assistance  within 7 day(s). 5.  Patient will participate in upper extremity therapeutic exercise/activities with supervision/set-up for 10 minutes within 7 day(s). 6.  Patient will don/doff back brace at supervision/set-up within 7 days. 7.  Patient will verbalize/demonstrate 3/3 back precautions during ADL tasks without cues within 7 days. Initiated 1/20/2022  1. Patient will perform upper body dressing with supervision/set-up within 7 day(s). 2.  Patient will perform lower body dressing using AE with moderate assistance  within 7 day(s). 3.  Patient will perform sliding board transfer to [facility] with moderate assistance  within 7 day(s). 4.  Patient will perform all aspects of toileting with moderate assistance  within 7 day(s). 5.  Patient will participate in upper extremity therapeutic exercise/activities with supervision/set-up for 10 minutes within 7 day(s). 6.  Patient will don/doff back brace at supervision/set-up within 7 days.   7.  Patient will verbalize/demonstrate 3/3 back precautions during ADL tasks without cues within 7 days. Outcome: Progressing Towards Goal     OCCUPATIONAL THERAPY TREATMENT  Patient: Ar Camarena (65 y.o. female)  Date: 1/29/2022  Diagnosis: [facility] [G95.20] <principal problem not specified>  Procedure(s) (LRB):  T11 - L2 LAMINECTOMY AND FUSION WITH O-ARM (N/A) 10 Days Post-Op  Precautions: Fall,Back  Chart, occupational therapy assessment, plan of care, and goals were reviewed. ASSESSMENT  Patient continues with skilled OT services and is progressing towards goals however remains limited by decreased balance, strength, cardiopulmonary endurance, safety awareness, distal lower body access, bowel & bladder continence, and maintenance of spinal precautions with toileting, functional mobility, upper body dressing, and grooming tasks completed this session. She is making steady gains, met in supine with bowel movement requiring Total A for hygiene, assisting to roll with Min-Mod A & bed rail support. She continues to require assist for trunk righting with supine>sit, good balance EOB with Min A required for donning TLSO with min cues for twisting. Improved functional mobility with CGA-Mod A & RW support, 2 standing rest breaks required d/t quick fatigue but improved HR with activity in the 120-130s. She remains motivated to progress, making steady gains, an excellent IPR candidate. Current Level of Function Impacting Discharge (ADLs): setup-Min A for upper body ADLs, Max-Total A for lower body ADLs, and CGA-Mod A for mobility with RW    Other factors to consider for discharge: fall risk, PMH, PLOF         PLAN :  Patient continues to benefit from skilled intervention to address the above impairments. Continue treatment per established plan of care to address goals. Recommend with staff: Recommend with nursing, ADLs with assist, OOB to chair 3x/day via rolling walker and toileting via beside commode pending continence.  Thank you for completing as able in order to maintain patient strength, endurance and independence. Recommendation for discharge: (in order for the patient to meet his/her long term goals)  Therapy 3 hours per day 5-7 days per week    This discharge recommendation:  Has been made in collaboration with the attending provider and/or case management    IF patient discharges home will need the following DME: To be determined (TBD)         SUBJECTIVE:   Patient stated I can feel some pressure there but then I've already gone.  re: bowel continence    OBJECTIVE DATA SUMMARY:   Cognitive/Behavioral Status:  Neurologic State: Alert  Orientation Level: Oriented X4  Cognition: Appropriate decision making; Appropriate safety awareness; Appropriate for age attention/concentration; Follows commands  Perception: Appears intact  Perseveration: No perseveration noted  Safety/Judgement: Awareness of environment; Fall prevention    Functional Mobility and Transfers for ADLs:  Bed Mobility:  Rolling: Minimum assistance; Moderate assistance (increased assist rolling to the L with bed rails)  Supine to Sit: Moderate assistance  Sit to Supine:  (in chair)  Scooting: Stand-by assistance    Transfers:  Sit to Stand: Moderate assistance; Adaptive equipment (RW)          Balance:  Sitting: Intact  Sitting - Static: Good (unsupported)  Sitting - Dynamic: Good (unsupported)  Standing: Impaired; With support (RW)  Standing - Static: Fair;Constant support  Standing - Dynamic : Fair;Poor;Constant support    ADL Intervention:       Grooming  Grooming Assistance: Set-up  Position Performed: Seated in chair  Washing Face: Set-up  Brushing Teeth: Set-up  Cues: Verbal cues provided;Visual cues provided    Upper Body Dressing Assistance  Dressing Assistance: Minimum assistance  Orthotics(Brace): Minimum assistance (TLSO; A for initial positioning & min cues for twisting)  Cues: Physical assistance; Tactile cues provided;Verbal cues provided;Visual cues provided    Toileting  Toileting Assistance: Total assistance(dependent)  Bladder Hygiene: Total assistance (dependent) (aguilera)  Bowel Hygiene: Total assistance (dependent)    Cognitive Retraining  Safety/Judgement: Awareness of environment; Fall prevention    The patient recalled and demonstrated 3/3 back precautions. Reviewed all 3 with patient. Dressing brace: Patient instructed and demonstrated to don/doff velcro on brace using dominant side, keeping non-dominant side intact. Patient instructed and demonstrated in meantime of being able to stand with back against wall to don/doff brace, to don/doff seated using lap and bed/chair surface to support brace while manipulating. Standing: Patient instructed and indicated understanding to walk up to sink/counter top/surfaces, step into walker, square off while using objects, slide objects along surfaces, to increase adherence to back precautions and fall prevention. Patient instructed to increase amount of time standing in order to increase independence and tolerance with ADLs. During prolonged standing, can open cabinet door or place foot on stool to decrease spinal pressure/increase pain. Patient instructed and indicated understanding the benefits of maintaining activity tolerance, functional mobility, and independence with self care tasks during acute stay  to ensure safe return home and to baseline. Encouraged patient to increase frequency and duration OOB, not sitting longer than 30 mins without marching/walking with staff, be out of bed for all meals, perform daily ADLs (as approved by RN/MD regarding bathing etc), and performing functional mobility to/from bathroom. Patient instruction and indicated understanding on body mechanics, ergonomics and gravitational force on the spine during different body positions to plan activities in prep for return home to complete instrumental ADLs and back to work safely.      Therapeutic Exercises:   Functional mobility in room (~15') with CGA progressing to Min A & RW support, 2 standing rest breaks <30 seconds    Pain:  Back and RLE pain reported    Activity Tolerance:   Good despite tachycardia (-133)    After treatment patient left in no apparent distress:   Sitting in chair, Call bell within reach, and Caregiver / family present    COMMUNICATION/COLLABORATION:   The patients plan of care was discussed with: Registered nurse. Charlett Sessions, OTD.  OTR/L  Time Calculation: 26 mins

## 2022-01-29 NOTE — PROGRESS NOTES
Hospitalist Progress Note      Hospital summary: 33 y.o lady w/ metastatic breast cancer who presented as a transfer from [facility] for management of cord compression from spine mets. Assessment/Plan:  Cord compression in the setting of metastatic breast cancer:  -s/p T11-L3 laminectomy on 1/19  -dexamethasone - completed course  -PT/OT as able  -neurosurgery and oncology consulted  -f/u pathology    Bilateral leg pain: some of this seems to be neuropathic  -continue fentanyl patch - increased dose to 75mcg/hr, PRN Dilaudid; IV stopped   -continue gabapentin - increased dose to 400 mg TID  -continue bowel regimen    Sinus tachycardia:   Likely due to pain and cancer-  asymptomatic; monitor. History of SVT on metoprolol  Metoprolol dose increased    Constipation due to narcotics- added stool softeners increased lactulose dose. Code status: full  DVT prophylaxis: sq Lovenox - continue until 2/2/22 per oncology  Disposition: rehab when medically appropriate  ----------------------------------------------    CC: bilateral leg pain    S: gets significant b/l leg pain w/ activity, otherwise it's controlled. No n/v/d, dyspnea. Adjusted pain meds, ordered venous dopplers    Review of Systems:  Pertinent items are noted in HPI.     O:  Visit Vitals  /89   Pulse (!) 117   Temp 98 °F (36.7 °C)   Resp 15   Ht 5' 2\" (1.575 m)   Wt 98 kg (216 lb 0.8 oz)   SpO2 97%   BMI 39.52 kg/m²     Patient Vitals for the past 24 hrs:   Temp Pulse Resp BP SpO2   01/28/22 1803 98 °F (36.7 °C) (!) 117 15 137/89 97 %   01/28/22 1400 97.9 °F (36.6 °C) (!) 111 19 124/82 98 %   01/28/22 1200 -- (!) 122 -- -- --   01/28/22 1000 -- (!) 130 -- -- --   01/28/22 0956 97.8 °F (36.6 °C) (!) 132 20 (!) 143/104 97 %   01/28/22 0611 98 °F (36.7 °C) (!) 108 17 (!) 122/99 97 %   01/28/22 0558 -- (!) 105 -- -- --   01/28/22 0411 -- (!) 105 -- -- --   01/28/22 0220 -- (!) 110 -- -- --   01/28/22 0214 99.5 °F (37.5 °C) (!) 105 16 116/81 97 %       PHYSICAL EXAM:  Gen: NAD, non-toxic, obese  HEENT: anicteric sclerae, normal conjunctiva  Neck: supple, trachea midline, no adenopathy  Heart: RRR, no MRG, no JVD, no peripheral edema  Lungs: CTA b/l, non-labored respirations  Abd: soft, NT, ND, BS+  Extr: warm  Skin: dry, no rash  Neuro: CN II-XII grossly intact, normal speech, minimal b/l LE movement 2/5 (unchanged)  Psych: normal mood, appropriate affect      Intake/Output Summary (Last 24 hours) at 1/28/2022 2252  Last data filed at 1/28/2022 0800  Gross per 24 hour   Intake --   Output 500 ml   Net -500 ml        Recent labs & imaging reviewed:  Recent Results (from the past 24 hour(s))   CBC W/O DIFF    Collection Time: 01/28/22  5:19 AM   Result Value Ref Range    WBC 7.4 3.6 - 11.0 K/uL    RBC 3.37 (L) 3.80 - 5.20 M/uL    HGB 8.7 (L) 11.5 - 16.0 g/dL    HCT 27.8 (L) 35.0 - 47.0 %    MCV 82.5 80.0 - 99.0 FL    MCH 25.8 (L) 26.0 - 34.0 PG    MCHC 31.3 30.0 - 36.5 g/dL    RDW 14.1 11.5 - 14.5 %    PLATELET 209 236 - 880 K/uL    MPV 9.3 8.9 - 12.9 FL    NRBC 0.8 (H) 0  WBC    ABSOLUTE NRBC 0.06 (H) 0.00 - 5.45 K/uL   METABOLIC PANEL, BASIC    Collection Time: 01/28/22  5:19 AM   Result Value Ref Range    Sodium 132 (L) 136 - 145 mmol/L    Potassium 4.3 3.5 - 5.1 mmol/L    Chloride 100 97 - 108 mmol/L    CO2 27 21 - 32 mmol/L    Anion gap 5 5 - 15 mmol/L    Glucose 94 65 - 100 mg/dL    BUN 13 6 - 20 MG/DL    Creatinine 0.44 (L) 0.55 - 1.02 MG/DL    BUN/Creatinine ratio 30 (H) 12 - 20      GFR est AA >60 >60 ml/min/1.73m2    GFR est non-AA >60 >60 ml/min/1.73m2    Calcium 10.5 (H) 8.5 - 10.1 MG/DL   MAGNESIUM    Collection Time: 01/28/22  5:19 AM   Result Value Ref Range    Magnesium 2.0 1.6 - 2.4 mg/dL   TSH 3RD GENERATION    Collection Time: 01/28/22  5:19 AM   Result Value Ref Range    TSH 4.37 (H) 0.36 - 3.74 uIU/mL     Recent Labs     01/28/22  0519   WBC 7.4   HGB 8.7*   HCT 27.8*        Recent Labs 01/28/22  0519   *   K 4.3      CO2 27   BUN 13   CREA 0.44*   GLU 94   CA 10.5*   MG 2.0     No results for input(s): ALT, AP, TBIL, TBILI, TP, ALB, GLOB, GGT, AML, LPSE in the last 72 hours. No lab exists for component: SGOT, GPT, AMYP, HLPSE  No results for input(s): INR, PTP, APTT, INREXT, INREXT in the last 72 hours. No results for input(s): FE, TIBC, PSAT, FERR in the last 72 hours. No results found for: FOL, RBCF   No results for input(s): PH, PCO2, PO2 in the last 72 hours. No results for input(s): CPK, CKNDX, TROIQ in the last 72 hours.     No lab exists for component: CPKMB  No results found for: CHOL, CHOLX, CHLST, CHOLV, HDL, HDLP, LDL, LDLC, DLDLP, TGLX, TRIGL, TRIGP, CHHD, CHHDX  No results found for: GLUCPOC  No results found for: COLOR, APPRN, SPGRU, REFSG, ROMERO, PROTU, GLUCU, KETU, BILU, UROU, SANAZ, LEUKU, GLUKE, EPSU, BACTU, WBCU, RBCU, CASTS, UCRY    Med list reviewed  Current Facility-Administered Medications   Medication Dose Route Frequency    HYDROmorphone (DILAUDID) tablet 4 mg  4 mg Oral Q3H PRN    lactulose (CHRONULAC) 10 gram/15 mL solution 30 mL  30 mL Oral BID    diphenhydrAMINE (BENADRYL) capsule 25 mg  25 mg Oral QHS    diphenhydrAMINE (BENADRYL) capsule 25 mg  25 mg Oral QHS PRN    metoprolol tartrate (LOPRESSOR) tablet 50 mg  50 mg Oral Q12H    senna-docusate (PERICOLACE) 8.6-50 mg per tablet 2 Tablet  2 Tablet Oral BID    fentaNYL (DURAGESIC) 75 mcg/hr patch 1 Patch  1 Patch TransDERmal Q72H    gabapentin (NEURONTIN) capsule 400 mg  400 mg Oral TID    baclofen (LIORESAL) tablet 10 mg  10 mg Oral TID    enoxaparin (LOVENOX) injection 40 mg  40 mg SubCUTAneous Q24H    bisacodyL (DULCOLAX) suppository 10 mg  10 mg Rectal DAILY PRN    polyethylene glycol (MIRALAX) packet 17 g  17 g Oral DAILY    sodium chloride (NS) flush 5-40 mL  5-40 mL IntraVENous Q8H    sodium chloride (NS) flush 5-40 mL  5-40 mL IntraVENous PRN    acetaminophen (TYLENOL) tablet 650 mg  650 mg Oral Q6H PRN    Or    acetaminophen (TYLENOL) suppository 650 mg  650 mg Rectal Q6H PRN    ondansetron (ZOFRAN ODT) tablet 4 mg  4 mg Oral Q8H PRN    Or    ondansetron (ZOFRAN) injection 4 mg  4 mg IntraVENous Q6H PRN    0.9% sodium chloride infusion 250 mL  250 mL IntraVENous PRN    sodium chloride (NS) flush 5-40 mL  5-40 mL IntraVENous Q8H    sodium chloride (NS) flush 5-40 mL  5-40 mL IntraVENous PRN    acetaminophen (TYLENOL) tablet 650 mg  650 mg Oral Q6H    naloxone (NARCAN) injection 0.4 mg  0.4 mg IntraVENous PRN    diphenhydrAMINE (BENADRYL) capsule 25 mg  25 mg Oral Q6H PRN    phenol throat spray (CHLORASEPTIC) 1 Spray  1 Spray Oral PRN    benzocaine-menthoL (CEPACOL) lozenge 1 Lozenge  1 Lozenge Oral PRN       Care Plan discussed with:  Patient/Family, Nurse and     Lauren Zavala MD  Internal Medicine  Date of Service: 1/28/2022

## 2022-01-30 LAB
ANION GAP SERPL CALC-SCNC: 5 MMOL/L (ref 5–15)
BUN SERPL-MCNC: 12 MG/DL (ref 6–20)
BUN/CREAT SERPL: 26 (ref 12–20)
CALCIUM SERPL-MCNC: 9.8 MG/DL (ref 8.5–10.1)
CHLORIDE SERPL-SCNC: 101 MMOL/L (ref 97–108)
CO2 SERPL-SCNC: 28 MMOL/L (ref 21–32)
CREAT SERPL-MCNC: 0.47 MG/DL (ref 0.55–1.02)
ERYTHROCYTE [DISTWIDTH] IN BLOOD BY AUTOMATED COUNT: 14.3 % (ref 11.5–14.5)
GLUCOSE SERPL-MCNC: 108 MG/DL (ref 65–100)
HCT VFR BLD AUTO: 26.3 % (ref 35–47)
HGB BLD-MCNC: 8.4 G/DL (ref 11.5–16)
MCH RBC QN AUTO: 26.1 PG (ref 26–34)
MCHC RBC AUTO-ENTMCNC: 31.9 G/DL (ref 30–36.5)
MCV RBC AUTO: 81.7 FL (ref 80–99)
NRBC # BLD: 0.05 K/UL (ref 0–0.01)
NRBC BLD-RTO: 0.7 PER 100 WBC
PLATELET # BLD AUTO: 337 K/UL (ref 150–400)
PMV BLD AUTO: 9.3 FL (ref 8.9–12.9)
POTASSIUM SERPL-SCNC: 4.4 MMOL/L (ref 3.5–5.1)
RBC # BLD AUTO: 3.22 M/UL (ref 3.8–5.2)
SODIUM SERPL-SCNC: 134 MMOL/L (ref 136–145)
WBC # BLD AUTO: 7.5 K/UL (ref 3.6–11)

## 2022-01-30 PROCEDURE — 74011250637 HC RX REV CODE- 250/637: Performed by: STUDENT IN AN ORGANIZED HEALTH CARE EDUCATION/TRAINING PROGRAM

## 2022-01-30 PROCEDURE — 85027 COMPLETE CBC AUTOMATED: CPT

## 2022-01-30 PROCEDURE — 74011250637 HC RX REV CODE- 250/637: Performed by: FAMILY MEDICINE

## 2022-01-30 PROCEDURE — 74011000250 HC RX REV CODE- 250: Performed by: SPECIALIST

## 2022-01-30 PROCEDURE — 97530 THERAPEUTIC ACTIVITIES: CPT

## 2022-01-30 PROCEDURE — 74011250637 HC RX REV CODE- 250/637: Performed by: SPECIALIST

## 2022-01-30 PROCEDURE — 74011250636 HC RX REV CODE- 250/636: Performed by: NEUROLOGICAL SURGERY

## 2022-01-30 PROCEDURE — 36415 COLL VENOUS BLD VENIPUNCTURE: CPT

## 2022-01-30 PROCEDURE — 74011250637 HC RX REV CODE- 250/637: Performed by: PHYSICIAN ASSISTANT

## 2022-01-30 PROCEDURE — 80048 BASIC METABOLIC PNL TOTAL CA: CPT

## 2022-01-30 PROCEDURE — 65660000000 HC RM CCU STEPDOWN

## 2022-01-30 PROCEDURE — 74011250637 HC RX REV CODE- 250/637: Performed by: HOSPITALIST

## 2022-01-30 PROCEDURE — 74011000250 HC RX REV CODE- 250: Performed by: STUDENT IN AN ORGANIZED HEALTH CARE EDUCATION/TRAINING PROGRAM

## 2022-01-30 PROCEDURE — 97116 GAIT TRAINING THERAPY: CPT

## 2022-01-30 RX ADMIN — SODIUM CHLORIDE, PRESERVATIVE FREE 10 ML: 5 INJECTION INTRAVENOUS at 05:40

## 2022-01-30 RX ADMIN — HYDROMORPHONE HYDROCHLORIDE 4 MG: 2 TABLET ORAL at 15:24

## 2022-01-30 RX ADMIN — POLYETHYLENE GLYCOL 3350 17 G: 17 POWDER, FOR SOLUTION ORAL at 09:32

## 2022-01-30 RX ADMIN — METOPROLOL TARTRATE 50 MG: 25 TABLET, FILM COATED ORAL at 09:32

## 2022-01-30 RX ADMIN — DIPHENHYDRAMINE HYDROCHLORIDE 25 MG: 25 CAPSULE ORAL at 21:43

## 2022-01-30 RX ADMIN — ACETAMINOPHEN 650 MG: 325 TABLET ORAL at 05:38

## 2022-01-30 RX ADMIN — ENOXAPARIN SODIUM 40 MG: 100 INJECTION SUBCUTANEOUS at 11:19

## 2022-01-30 RX ADMIN — ACETAMINOPHEN 650 MG: 325 TABLET ORAL at 00:00

## 2022-01-30 RX ADMIN — SODIUM CHLORIDE, PRESERVATIVE FREE 10 ML: 5 INJECTION INTRAVENOUS at 21:44

## 2022-01-30 RX ADMIN — HYDROMORPHONE HYDROCHLORIDE 4 MG: 2 TABLET ORAL at 09:32

## 2022-01-30 RX ADMIN — GABAPENTIN 400 MG: 400 CAPSULE ORAL at 21:44

## 2022-01-30 RX ADMIN — ACETAMINOPHEN 650 MG: 325 TABLET ORAL at 00:08

## 2022-01-30 RX ADMIN — HYDROMORPHONE HYDROCHLORIDE 4 MG: 2 TABLET ORAL at 05:38

## 2022-01-30 RX ADMIN — METOPROLOL TARTRATE 50 MG: 25 TABLET, FILM COATED ORAL at 21:41

## 2022-01-30 RX ADMIN — SODIUM CHLORIDE, PRESERVATIVE FREE 10 ML: 5 INJECTION INTRAVENOUS at 15:25

## 2022-01-30 RX ADMIN — HYDROMORPHONE HYDROCHLORIDE 4 MG: 2 TABLET ORAL at 18:26

## 2022-01-30 RX ADMIN — ACETAMINOPHEN 650 MG: 325 TABLET ORAL at 18:04

## 2022-01-30 RX ADMIN — HYDROMORPHONE HYDROCHLORIDE 4 MG: 2 TABLET ORAL at 21:41

## 2022-01-30 RX ADMIN — GABAPENTIN 400 MG: 400 CAPSULE ORAL at 09:32

## 2022-01-30 RX ADMIN — GABAPENTIN 400 MG: 400 CAPSULE ORAL at 15:24

## 2022-01-30 RX ADMIN — SODIUM CHLORIDE, PRESERVATIVE FREE 10 ML: 5 INJECTION INTRAVENOUS at 05:39

## 2022-01-30 RX ADMIN — HYDROMORPHONE HYDROCHLORIDE 4 MG: 2 TABLET ORAL at 03:16

## 2022-01-30 RX ADMIN — ACETAMINOPHEN 650 MG: 325 TABLET ORAL at 11:19

## 2022-01-30 RX ADMIN — HYDROMORPHONE HYDROCHLORIDE 4 MG: 2 TABLET ORAL at 12:50

## 2022-01-30 RX ADMIN — SODIUM CHLORIDE, PRESERVATIVE FREE 20 ML: 5 INJECTION INTRAVENOUS at 15:24

## 2022-01-30 RX ADMIN — HYDROMORPHONE HYDROCHLORIDE 4 MG: 2 TABLET ORAL at 00:08

## 2022-01-30 RX ADMIN — SODIUM CHLORIDE, PRESERVATIVE FREE 10 ML: 5 INJECTION INTRAVENOUS at 06:00

## 2022-01-30 NOTE — PROGRESS NOTES
Neurosurgery  Sitting up in bed.     Did some walking yesterday with assist !    Awaiting rehab, hopefully Monday

## 2022-01-30 NOTE — PROGRESS NOTES
Bedside shift change report given to Cy (oncoming nurse) by Vasiliy (offgoing nurse). Report included the following information SBAR, Kardex, Procedure Summary, Intake/Output, MAR, Recent Results, Med Rec Status, Procedure Verification, Quality Measures and Dual Neuro Assessment.

## 2022-01-30 NOTE — PROGRESS NOTES
Problem: Falls - Risk of  Goal: *Absence of Falls  Description: Document [place] Fall Risk and appropriate interventions in the flowsheet.   Outcome: Progressing Towards Goal  Note: Fall Risk Interventions:  Mobility Interventions: Communicate number of staff needed for ambulation/transfer         Medication Interventions: Patient to call before getting OOB,Teach patient to arise slowly    Elimination Interventions: Call light in reach,Patient to call for help with toileting needs              Problem: Pain  Goal: *Control of Pain  Outcome: Progressing Towards Goal     Problem: Patient Education: Go to Patient Education Activity  Goal: Patient/Family Education  Outcome: Progressing Towards Goal     Problem: Patient Education: Go to Patient Education Activity  Goal: Patient/Family Education  Outcome: Progressing Towards Goal

## 2022-01-30 NOTE — PROGRESS NOTES
Problem: Mobility Impaired (Adult and Pediatric)  Goal: *Acute Goals and Plan of Care (Insert Text)  Description: FUNCTIONAL STATUS PRIOR TO ADMISSION: Patient was independent and active without use of DME. She has history of breast cancer with mets to the spine. HOME SUPPORT PRIOR TO ADMISSION: Pt lives with her 9year old son. Her mother is also involved in her care. Physical Therapy Goals    Reassessed on 1/24/2022  1. Patient will move from supine to sit and sit to supine , scoot up and down, and roll side to side in bed with moderate assistance  within 7 days. 2. Patient will perform sit to stand with maximal assistance within 7 days. 3. Patient will ambulate with maximal assistance for 5 feet with the least restrictive device within 7 days. 4. Patient will verbalize and demonstrate understanding of spinal precautions (No bending, lifting greater than 5 lbs, or twisting; log-roll technique; frequent repositioning as instructed) within 7 days. Initiated 1/20/2022  1. Patient will move from supine to sit and sit to supine , scoot up and down, and roll side to side in bed with moderate assistance  within 4 days. 2. Patient will perform sit to stand with maximal assistance within 4 days. 3. Patient will ambulate with maximal assistance for 5 feet with the least restrictive device within 4 days. 4. Patient will verbalize and demonstrate understanding of spinal precautions (No bending, lifting greater than 5 lbs, or twisting; log-roll technique; frequent repositioning as instructed) within 4 days.         Outcome: Progressing Towards Goal     PHYSICAL THERAPY TREATMENT  Patient: Ar Camarena (59 y.o. female)  Date: 1/30/2022  Diagnosis: Cord compression syndrome ([facility]) [G95.20] <principal problem not specified>  Procedure(s) (LRB):  T11 - L2 LAMINECTOMY AND FUSION WITH O-ARM (N/A) 11 Days Post-Op  Precautions: Fall,Back No bending, no lifting greater than 5 lbs, no twisting, log-roll technique, repositioning every 20-30 min except when sleeping, brace when OOB (if ordered)  Chart, physical therapy assessment, plan of care and goals were reviewed. ASSESSMENT  Patient continues with skilled PT services and is progressing towards goals. Patient continues to be very motivated to make gains. Overall mod A for rolling and to come to sitting using bedrail for assistance. Patient sitting balance intact with B UE support. Required mod A for sit to stand. Improved ambulation to 25 feet with RW and min A, required chair follow for safety + fatigue. Patient remained OOB in chair at end of session. VSS throughout session. Recommend IPR at discharge. Current Level of Function Impacting Discharge (mobility/balance): mod A for transfers    Other factors to consider for discharge:          PLAN :  Patient continues to benefit from skilled intervention to address the above impairments. Continue treatment per established plan of care. to address goals. Recommendation for discharge: (in order for the patient to meet his/her long term goals)  Therapy 3 hours per day 5-7 days per week    This discharge recommendation:  Has been made in collaboration with the attending provider and/or case management    IF patient discharges home will need the following DME: to be determined (TBD)       SUBJECTIVE:   Patient stated I hope I get to go to rehab soon.     OBJECTIVE DATA SUMMARY:   Critical Behavior:  Neurologic State: Alert  Orientation Level: Oriented X4  Cognition: Appropriate decision making,Appropriate for age attention/concentration,Appropriate safety awareness  Safety/Judgement: Awareness of environment,Fall prevention    Spinal diagnosis intervention:  The patient stated 3/3 back precautions when prompted. Reviewed all 3 back precautions, log roll technique, and sitting for 30 minutes at a time. The patient required no cues to maintain back precautions during functional activity.   Reviewed back brace application and wear schedule. Brace donned with supervision/set-up      Functional Mobility Training:    Bed Mobility:  Log Rolling: Minimum assistance; Moderate assistance;Assist x1;Additional time; Adaptive equipment (going L)  Supine to Sit: Moderate assistance;Assist x1;Additional time     Scooting: Stand-by assistance (to EOB)        Transfers:  Sit to Stand: Moderate assistance; Adaptive equipment; Additional time  Stand to Sit: Minimum assistance;Assist x1;Additional time                             Balance:  Sitting: Intact; With support  Sitting - Static: Supported sitting (good B UE support)  Sitting - Dynamic: Good (unsupported); Fair (occasional)  Standing: Impaired; With support  Standing - Static: Fair;Constant support  Standing - Dynamic : Fair;Constant support  Ambulation/Gait Training:  Distance (ft): 25 Feet (ft)  Assistive Device: Gait belt;Brace/Splint; Walker, rolling  Ambulation - Level of Assistance: Minimal assistance;Assist x1 (chair follow for safety)        Gait Abnormalities: Decreased step clearance (heavy reliance on UE's)        Base of Support: Narrowed     Speed/Deedee: Pace decreased (<100 feet/min)  Step Length: Right shortened;Left shortened  Swing Pattern: Right asymmetrical                 Stairs: Therapeutic Exercises:     Pain Rating:      Activity Tolerance:   Good    After treatment patient left in no apparent distress:   Sitting in chair and Call bell within reach    COMMUNICATION/COLLABORATION:   The patients plan of care was discussed with: Registered nurse.      [place] PT   Time Calculation: 25 mins

## 2022-01-31 LAB
ANION GAP SERPL CALC-SCNC: 6 MMOL/L (ref 5–15)
BUN SERPL-MCNC: 12 MG/DL (ref 6–20)
BUN/CREAT SERPL: 21 (ref 12–20)
CALCIUM SERPL-MCNC: 9.8 MG/DL (ref 8.5–10.1)
CHLORIDE SERPL-SCNC: 101 MMOL/L (ref 97–108)
CO2 SERPL-SCNC: 25 MMOL/L (ref 21–32)
CREAT SERPL-MCNC: 0.56 MG/DL (ref 0.55–1.02)
ERYTHROCYTE [DISTWIDTH] IN BLOOD BY AUTOMATED COUNT: 14.5 % (ref 11.5–14.5)
GLUCOSE SERPL-MCNC: 109 MG/DL (ref 65–100)
HCT VFR BLD AUTO: 28.1 % (ref 35–47)
HGB BLD-MCNC: 8.6 G/DL (ref 11.5–16)
MCH RBC QN AUTO: 25.7 PG (ref 26–34)
MCHC RBC AUTO-ENTMCNC: 30.6 G/DL (ref 30–36.5)
MCV RBC AUTO: 84.1 FL (ref 80–99)
NRBC # BLD: 0.03 K/UL (ref 0–0.01)
NRBC BLD-RTO: 0.5 PER 100 WBC
PLATELET # BLD AUTO: 319 K/UL (ref 150–400)
PMV BLD AUTO: 9.4 FL (ref 8.9–12.9)
POTASSIUM SERPL-SCNC: 4.2 MMOL/L (ref 3.5–5.1)
RBC # BLD AUTO: 3.34 M/UL (ref 3.8–5.2)
SARS-COV-2, COV2: NORMAL
SODIUM SERPL-SCNC: 132 MMOL/L (ref 136–145)
WBC # BLD AUTO: 6.5 K/UL (ref 3.6–11)

## 2022-01-31 PROCEDURE — U0005 INFEC AGEN DETEC AMPLI PROBE: HCPCS

## 2022-01-31 PROCEDURE — 74011000250 HC RX REV CODE- 250: Performed by: SPECIALIST

## 2022-01-31 PROCEDURE — 74011250636 HC RX REV CODE- 250/636: Performed by: NEUROLOGICAL SURGERY

## 2022-01-31 PROCEDURE — 36415 COLL VENOUS BLD VENIPUNCTURE: CPT

## 2022-01-31 PROCEDURE — 97535 SELF CARE MNGMENT TRAINING: CPT

## 2022-01-31 PROCEDURE — 85027 COMPLETE CBC AUTOMATED: CPT

## 2022-01-31 PROCEDURE — 80048 BASIC METABOLIC PNL TOTAL CA: CPT

## 2022-01-31 PROCEDURE — 97530 THERAPEUTIC ACTIVITIES: CPT

## 2022-01-31 PROCEDURE — 94760 N-INVAS EAR/PLS OXIMETRY 1: CPT

## 2022-01-31 PROCEDURE — 74011250637 HC RX REV CODE- 250/637: Performed by: HOSPITALIST

## 2022-01-31 PROCEDURE — 74011250637 HC RX REV CODE- 250/637: Performed by: PHYSICIAN ASSISTANT

## 2022-01-31 PROCEDURE — 74011250637 HC RX REV CODE- 250/637: Performed by: FAMILY MEDICINE

## 2022-01-31 PROCEDURE — 97116 GAIT TRAINING THERAPY: CPT

## 2022-01-31 PROCEDURE — 74011000250 HC RX REV CODE- 250: Performed by: STUDENT IN AN ORGANIZED HEALTH CARE EDUCATION/TRAINING PROGRAM

## 2022-01-31 PROCEDURE — 65660000000 HC RM CCU STEPDOWN

## 2022-01-31 PROCEDURE — 74011250637 HC RX REV CODE- 250/637: Performed by: SPECIALIST

## 2022-01-31 RX ADMIN — HYDROMORPHONE HYDROCHLORIDE 4 MG: 2 TABLET ORAL at 23:56

## 2022-01-31 RX ADMIN — SODIUM CHLORIDE, PRESERVATIVE FREE 10 ML: 5 INJECTION INTRAVENOUS at 22:42

## 2022-01-31 RX ADMIN — GABAPENTIN 400 MG: 400 CAPSULE ORAL at 17:01

## 2022-01-31 RX ADMIN — LACTULOSE 30 ML: 20 SOLUTION ORAL at 09:49

## 2022-01-31 RX ADMIN — HYDROMORPHONE HYDROCHLORIDE 4 MG: 2 TABLET ORAL at 06:23

## 2022-01-31 RX ADMIN — HYDROMORPHONE HYDROCHLORIDE 4 MG: 2 TABLET ORAL at 20:43

## 2022-01-31 RX ADMIN — SODIUM CHLORIDE, PRESERVATIVE FREE 10 ML: 5 INJECTION INTRAVENOUS at 22:41

## 2022-01-31 RX ADMIN — BACLOFEN 10 MG: 10 TABLET ORAL at 20:42

## 2022-01-31 RX ADMIN — HYDROMORPHONE HYDROCHLORIDE 4 MG: 2 TABLET ORAL at 17:04

## 2022-01-31 RX ADMIN — GABAPENTIN 400 MG: 400 CAPSULE ORAL at 22:41

## 2022-01-31 RX ADMIN — ACETAMINOPHEN 650 MG: 325 TABLET ORAL at 00:48

## 2022-01-31 RX ADMIN — SENNOSIDES AND DOCUSATE SODIUM 2 TABLET: 8.6; 5 TABLET ORAL at 09:48

## 2022-01-31 RX ADMIN — ACETAMINOPHEN 650 MG: 325 TABLET ORAL at 06:23

## 2022-01-31 RX ADMIN — SODIUM CHLORIDE, PRESERVATIVE FREE 10 ML: 5 INJECTION INTRAVENOUS at 14:46

## 2022-01-31 RX ADMIN — ACETAMINOPHEN 650 MG: 325 TABLET ORAL at 12:00

## 2022-01-31 RX ADMIN — HYDROMORPHONE HYDROCHLORIDE 4 MG: 2 TABLET ORAL at 00:47

## 2022-01-31 RX ADMIN — SENNOSIDES AND DOCUSATE SODIUM 2 TABLET: 8.6; 5 TABLET ORAL at 17:04

## 2022-01-31 RX ADMIN — ACETAMINOPHEN 650 MG: 325 TABLET ORAL at 17:03

## 2022-01-31 RX ADMIN — SODIUM CHLORIDE, PRESERVATIVE FREE 10 ML: 5 INJECTION INTRAVENOUS at 06:22

## 2022-01-31 RX ADMIN — GABAPENTIN 400 MG: 400 CAPSULE ORAL at 09:49

## 2022-01-31 RX ADMIN — POLYETHYLENE GLYCOL 3350 17 G: 17 POWDER, FOR SOLUTION ORAL at 09:50

## 2022-01-31 RX ADMIN — BACLOFEN 10 MG: 10 TABLET ORAL at 09:48

## 2022-01-31 RX ADMIN — HYDROMORPHONE HYDROCHLORIDE 4 MG: 2 TABLET ORAL at 09:48

## 2022-01-31 RX ADMIN — ENOXAPARIN SODIUM 40 MG: 100 INJECTION SUBCUTANEOUS at 12:31

## 2022-01-31 RX ADMIN — DIPHENHYDRAMINE HYDROCHLORIDE 25 MG: 25 CAPSULE ORAL at 20:43

## 2022-01-31 RX ADMIN — ACETAMINOPHEN 650 MG: 325 TABLET ORAL at 09:48

## 2022-01-31 RX ADMIN — METOPROLOL TARTRATE 50 MG: 25 TABLET, FILM COATED ORAL at 20:43

## 2022-01-31 RX ADMIN — HYDROMORPHONE HYDROCHLORIDE 4 MG: 2 TABLET ORAL at 03:39

## 2022-01-31 RX ADMIN — ACETAMINOPHEN 650 MG: 325 TABLET ORAL at 23:56

## 2022-01-31 RX ADMIN — HYDROMORPHONE HYDROCHLORIDE 4 MG: 2 TABLET ORAL at 12:30

## 2022-01-31 RX ADMIN — METOPROLOL TARTRATE 50 MG: 25 TABLET, FILM COATED ORAL at 09:49

## 2022-01-31 NOTE — PROGRESS NOTES
Bedside and Verbal shift change report given to Uday RN (oncoming nurse) by Mercedes Anderson RN (offgoing nurse). Report included the following information SBAR, Kardex, Intake/Output, MAR, Recent Results, Cardiac Rhythm NSR and Dual Neuro Assessment. I have read and agree with Sharon Flores RN's documentation as her preceptor.

## 2022-01-31 NOTE — PROGRESS NOTES
Problem: Mobility Impaired (Adult and Pediatric)  Goal: *Acute Goals and Plan of Care (Insert Text)  Description: FUNCTIONAL STATUS PRIOR TO ADMISSION: Patient was independent and active without use of DME. She has history of breast cancer with mets to the spine. HOME SUPPORT PRIOR TO ADMISSION: Pt lives with her 9year old son. Her mother is also involved in her care. Physical Therapy Goals  Re-Assessed and upgraded 1/31/2022  1. Patient will move from supine to sit and sit to supine , scoot up and down, and roll side to side in bed with minimal assistance/contact guard assist within 7 day(s). 2.  Patient will transfer from bed to chair and chair to bed with minimal assistance/contact guard assist using the least restrictive device within 7 day(s). 3.  Patient will perform sit to stand with minimal assistance/contact guard assist within 7 day(s). 4.  Patient will ambulate with minimal assistance/contact guard assist for 50 feet with the least restrictive device within 7 day(s). 5. Patient will verbalize and demonstrate understanding of spinal precautions (No bending, lifting greater than 5 lbs, or twisting; log-roll technique; frequent repositioning as instructed) within 7 days. Reassessed on 1/24/2022  1. Patient will move from supine to sit and sit to supine , scoot up and down, and roll side to side in bed with moderate assistance  within 7 days. 2. Patient will perform sit to stand with maximal assistance within 7 days. 3. Patient will ambulate with maximal assistance for 5 feet with the least restrictive device within 7 days. 4. Patient will verbalize and demonstrate understanding of spinal precautions (No bending, lifting greater than 5 lbs, or twisting; log-roll technique; frequent repositioning as instructed) within 7 days. Initiated 1/20/2022  1.  Patient will move from supine to sit and sit to supine , scoot up and down, and roll side to side in bed with moderate assistance within 4 days. 2. Patient will perform sit to stand with maximal assistance within 4 days. 3. Patient will ambulate with maximal assistance for 5 feet with the least restrictive device within 4 days. 4. Patient will verbalize and demonstrate understanding of spinal precautions (No bending, lifting greater than 5 lbs, or twisting; log-roll technique; frequent repositioning as instructed) within 4 days. Outcome: Progressing Towards Goal   PHYSICAL THERAPY TREATMENT: WEEKLY REASSESSMENT  Patient: Leroy March (57 y.o. female)  Date: 1/31/2022  Primary Diagnosis: Cord compression syndrome ([facility] [G95.20]  Procedure(s) (LRB):  T11 - L2 LAMINECTOMY AND FUSION WITH O-ARM (N/A) 12 Days Post-Op   Precautions:   Fall,Back      ASSESSMENT  Patient continues with skilled PT services and is progressing towards goals. Pt continues to make steady improvement in strength, balance and activity tolerance. Remains limited by weakness in bilateral LEs, impaired standing balance, altered gait mechanics, tachycardia with activity and LE pain. Pt requires Min A to come to sitting EOB via log roll, Min A to stand and initially Min A to ambulate into bathroom. Pt fatigued after toileting and need sup to Mod A for commode stands and seated rest break upon walking back into the room. Dons brace with increased time and supervision. Pt motivated to regain independence and strength continue to recommend IPR. Patient's progression toward goals since last assessment: steady progress, pt is now ambulating with A x 1    Current Level of Function Impacting Discharge (mobility/balance): Mod A to stand when fatigued, short gait distances only    Functional Outcome Measure: The patient scored 40/100 on the Barthel outcome measure. Other factors to consider for discharge: pain control, weakness, high fall risk         PLAN :  Goals have been updated based on progression since last assessment.   Patient continues to benefit from skilled intervention to address the above impairments. Recommendations and Planned Interventions: bed mobility training, transfer training, gait training, therapeutic exercises, neuromuscular re-education, patient and family training/education, and therapeutic activities      Frequency/Duration: Patient will be followed by physical therapy:  daily to address goals. Recommendation for discharge: (in order for the patient to meet his/her long term goals)  Therapy 3 hours per day 5-7 days per week    This discharge recommendation:  Has been made in collaboration with the attending provider and/or case management    IF patient discharges home will need the following DME: to be determined (TBD)         SUBJECTIVE:   Patient stated I am just tired now.     OBJECTIVE DATA SUMMARY:   HISTORY:    History reviewed. No pertinent past medical history. No past surgical history on file.     Personal factors and/or comorbidities impacting plan of care: breast cancer    Home Situation  Home Environment: Private residence  # Steps to Enter: 3  Rails to Enter: Yes  Hand Rails : Left  One/Two Story Residence: Two story  # of Interior Steps: 13  Interior Rails: Left  Living Alone: No  Support Systems: Parent(s)  Patient Expects to be Discharged to[de-identified] Other: (IPR vs HH)  Current DME Used/Available at Home: Cane, straight,Walker, rolling  Tub or Shower Type: Shower    EXAMINATION/PRESENTATION/DECISION MAKING:   Critical Behavior:  Neurologic State: Alert  Orientation Level: Oriented X4  Cognition: Appropriate decision making,Appropriate for age attention/concentration,Appropriate safety awareness,Follows commands  Safety/Judgement: Awareness of environment,Fall prevention  Hearing:     Skin:    Edema:   Range Of Motion:  AROM: Generally decreased, functional                       Strength:    Strength: Generally decreased, functional                    Tone & Sensation:   Tone: Normal Coordination:  Coordination: Generally decreased, functional  Vision:      Functional Mobility:  Bed Mobility:  Rolling: Stand-by assistance  Supine to Sit: Minimum assistance     Scooting: Minimum assistance  Transfers:  Sit to Stand: Minimum assistance; Moderate assistance (depending on height of surface)  Stand to Sit: Minimum assistance                       Balance:   Sitting: Intact  Standing: Impaired; With support  Standing - Static: Fair  Standing - Dynamic : Fair;Constant support  Ambulation/Gait Training:  Distance (ft): 14 Feet (ft) (x 2 with break sitting on commode)  Assistive Device: Gait belt;Walker, rolling;Brace/Splint  Ambulation - Level of Assistance: Minimal assistance; Moderate assistance;Assist x1        Gait Abnormalities: Decreased step clearance;Shuffling gait        Base of Support: Widened     Speed/Deedee: Pace decreased (<100 feet/min); Slow;Delayed  Step Length: Right shortened;Left shortened  Swing Pattern: Right asymmetrical                  Stairs: Therapeutic Exercises:       Functional Measure:  Barthel Index:    Bathin  Bladder: 0  Bowels: 5  Groomin  Dressin  Feeding: 10  Mobility: 0  Stairs: 0  Toilet Use: 5  Transfer (Bed to Chair and Back): 10  Total: 40/100       The Barthel ADL Index: Guidelines  1. The index should be used as a record of what a patient does, not as a record of what a patient could do. 2. The main aim is to establish degree of independence from any help, physical or verbal, however minor and for whatever reason. 3. The need for supervision renders the patient not independent. 4. A patient's performance should be established using the best available evidence. Asking the patient, friends/relatives and nurses are the usual sources, but direct observation and common sense are also important. However direct testing is not needed.   5. Usually the patient's performance over the preceding 24-48 hours is important, but occasionally longer periods will be relevant. 6. Middle categories imply that the patient supplies over 50 per cent of the effort. 7. Use of aids to be independent is allowed. Score Interpretation (from [address])    Independent   60-79 Minimally independent   40-59 Partially dependent   20-39 Very dependent   <20 Totally dependent     -Travis Caruso., Barthel, D.W. (1965). Functional evaluation: the Barthel Index. [address] ([address] (1997). The Barthel activities of daily living index: self-reporting versus actual performance in the old (> or = 75 years). Journal of [address] [place] [address] 45(7), [facility], MARYANN, Ricardo Perry., Bob Beasley. (1999). Measuring the change in disability after inpatient rehabilitation; comparison of the responsiveness of the Barthel Index and Functional Donley Measure. Journal of Neurology, Neurosurgery, and Psychiatry, 66(4), 828-082. Garrett Armando, N.J.A, ELIAN Reynoso, & Abhi Bautista MAlbaniaA. (2004) Assessment of post-stroke quality of life in cost-effectiveness studies: The usefulness of the Barthel Index and the EuroQoL-5D. Quality of Life Research, 15, 819-10       Activity Tolerance:   Fair    After treatment patient left in no apparent distress:   Sitting in chair, Call bell within reach, Bed / chair alarm activated, and Caregiver / family present    COMMUNICATION/EDUCATION:   The patients plan of care was discussed with: Occupational therapist and Registered nurse. Fall prevention education was provided and the patient/caregiver indicated understanding., Patient/family have participated as able in goal setting and plan of care. , and Patient/family agree to work toward stated goals and plan of care.     Thank you for this referral.  Colin Burger, PT   Time Calculation: 36 mins

## 2022-01-31 NOTE — PROGRESS NOTES
Hospitalist Progress Note      Hospital summary: 33 y.o lady w/ metastatic breast cancer who presented as a transfer from [facility] for management of cord compression from spine mets. Assessment/Plan:  Cord compression in the setting of metastatic breast cancer:  -s/p T11-L3 laminectomy on 1/19  -dexamethasone - completed course  -PT/OT as able with back brace  -neurosurgery and oncology consulted  -f/u pathology    Bilateral leg pain: some of this seems to be neuropathic  -continue fentanyl patch - increased dose to 75mcg/hr, dilaudid dose increased  -continue gabapentin - increased dose to 400 mg TID    Sinus tachycardia: improved  Likely due to pain and cancer-  asymptomatic; monitor. History of SVT on metoprolol  Metoprolol dose increased    Constipation due to narcotics- resolved after added stool softeners increased lactulose dose. Urinary retention- likely due to pain med and spinal issues  Ochoa placed and patient to keep for 2 weeks to decompress bladder prior to removal     Psych- screened for depression- offered meds and or psych consult- patient declined  Insomnia- improved on benadryl    Code status: full  DVT prophylaxis: sq Lovenox - continue until 2/2/22 per oncology  Disposition: rehab when medically appropriate  ----------------------------------------------    CC: bilateral leg pain    S: gets significant b/l leg pain w/ activity, otherwise it's controlled. No n/v/d, dyspnea. Better pain control, still constipation    Review of Systems:  Pertinent items are noted in HPI.     O:  Visit Vitals  /81 (BP 1 Location: Right arm, BP Patient Position: At rest)   Pulse (!) 119   Temp 97.7 °F (36.5 °C)   Resp 17   Ht 5' 2\" (1.575 m)   Wt 98 kg (216 lb 0.8 oz)   SpO2 96%   BMI 39.52 kg/m²     Patient Vitals for the past 24 hrs:   Temp Pulse Resp BP SpO2   01/30/22 1826 97.7 °F (36.5 °C) (!) 119 17 122/81 96 %   01/30/22 1800 -- (!) 111 -- -- --   01/30/22 1405 -- (!) 105 -- -- --   01/30/22 1400 98.5 °F (36.9 °C) (!) 107 19 135/78 --   01/30/22 1200 -- (!) 112 -- -- --   01/30/22 1000 -- (!) 110 -- -- --   01/30/22 0935 98.9 °F (37.2 °C) 100 24 127/80 97 %   01/30/22 0932 -- (!) 104 -- 127/80 --   01/30/22 0542 98 °F (36.7 °C) (!) 108 18 120/85 97 %   01/29/22 2200 98.8 °F (37.1 °C) (!) 123 24 125/85 98 %       PHYSICAL EXAM:  Gen: NAD, awake and alert, pleasant on exam  HEENT: anicteric sclerae, normal conjunctiva  Neck: supple, trachea midline, no adenopathy  Heart: tachycardia, no Murmurs  Lungs: CTA b/l,  Abd: soft, NT, ND, BS+  Extr: warm  Skin: dry, no rash  Neuro: CN II-XII grossly intact, normal speech, minimal b/l LE movement 2/5 (unchanged)  Psych: normal mood, appropriate affect      Intake/Output Summary (Last 24 hours) at 1/30/2022 2031  Last data filed at 1/30/2022 1200  Gross per 24 hour   Intake --   Output 1175 ml   Net -1175 ml        Recent labs & imaging reviewed:  Recent Results (from the past 24 hour(s))   CBC W/O DIFF    Collection Time: 01/30/22  3:18 AM   Result Value Ref Range    WBC 7.5 3.6 - 11.0 K/uL    RBC 3.22 (L) 3.80 - 5.20 M/uL    HGB 8.4 (L) 11.5 - 16.0 g/dL    HCT 26.3 (L) 35.0 - 47.0 %    MCV 81.7 80.0 - 99.0 FL    MCH 26.1 26.0 - 34.0 PG    MCHC 31.9 30.0 - 36.5 g/dL    RDW 14.3 11.5 - 14.5 %    PLATELET 283 403 - 645 K/uL    MPV 9.3 8.9 - 12.9 FL    NRBC 0.7 (H) 0  WBC    ABSOLUTE NRBC 0.05 (H) 0.00 - 5.01 K/uL   METABOLIC PANEL, BASIC    Collection Time: 01/30/22  3:18 AM   Result Value Ref Range    Sodium 134 (L) 136 - 145 mmol/L    Potassium 4.4 3.5 - 5.1 mmol/L    Chloride 101 97 - 108 mmol/L    CO2 28 21 - 32 mmol/L    Anion gap 5 5 - 15 mmol/L    Glucose 108 (H) 65 - 100 mg/dL    BUN 12 6 - 20 MG/DL    Creatinine 0.47 (L) 0.55 - 1.02 MG/DL    BUN/Creatinine ratio 26 (H) 12 - 20      GFR est AA >60 >60 ml/min/1.73m2    GFR est non-AA >60 >60 ml/min/1.73m2    Calcium 9.8 8.5 - 10.1 MG/DL     Recent Labs     01/30/22  2022 01/28/22  0519   WBC 7.5 7.4   HGB 8.4* 8.7*   HCT 26.3* 27.8*    328     Recent Labs     01/30/22  0318 01/28/22 0519   * 132*   K 4.4 4.3    100   CO2 28 27   BUN 12 13   CREA 0.47* 0.44*   * 94   CA 9.8 10.5*   MG  --  2.0     Recent Labs     01/29/22  0410   ALT 51   *   TBILI 0.2   TP 6.8   ALB 2.6*   GLOB 4.2*     No results for input(s): INR, PTP, APTT, INREXT, INREXT in the last 72 hours. No results for input(s): FE, TIBC, PSAT, FERR in the last 72 hours. No results found for: FOL, RBCF   No results for input(s): PH, PCO2, PO2 in the last 72 hours. No results for input(s): CPK, CKNDX, TROIQ in the last 72 hours.     No lab exists for component: CPKMB  No results found for: CHOL, CHOLX, CHLST, CHOLV, HDL, HDLP, LDL, LDLC, DLDLP, TGLX, TRIGL, TRIGP, CHHD, CHHDX  No results found for: GLUCPOC  No results found for: COLOR, APPRN, SPGRU, REFSG, ROMERO, PROTU, GLUCU, KETU, BILU, UROU, SANAZ, LEUKU, GLUKE, EPSU, BACTU, WBCU, RBCU, CASTS, UCRY    Med list reviewed  Current Facility-Administered Medications   Medication Dose Route Frequency    baclofen (LIORESAL) tablet 10 mg  10 mg Oral QID PRN    HYDROmorphone (DILAUDID) tablet 4 mg  4 mg Oral Q3H PRN    lactulose (CHRONULAC) 10 gram/15 mL solution 30 mL  30 mL Oral BID    diphenhydrAMINE (BENADRYL) capsule 25 mg  25 mg Oral QHS    diphenhydrAMINE (BENADRYL) capsule 25 mg  25 mg Oral QHS PRN    metoprolol tartrate (LOPRESSOR) tablet 50 mg  50 mg Oral Q12H    senna-docusate (PERICOLACE) 8.6-50 mg per tablet 2 Tablet  2 Tablet Oral BID    fentaNYL (DURAGESIC) 75 mcg/hr patch 1 Patch  1 Patch TransDERmal Q72H    gabapentin (NEURONTIN) capsule 400 mg  400 mg Oral TID    enoxaparin (LOVENOX) injection 40 mg  40 mg SubCUTAneous Q24H    bisacodyL (DULCOLAX) suppository 10 mg  10 mg Rectal DAILY PRN    polyethylene glycol (MIRALAX) packet 17 g  17 g Oral DAILY    sodium chloride (NS) flush 5-40 mL  5-40 mL IntraVENous Q8H    sodium chloride (NS) flush 5-40 mL  5-40 mL IntraVENous PRN    acetaminophen (TYLENOL) tablet 650 mg  650 mg Oral Q6H PRN    Or    acetaminophen (TYLENOL) suppository 650 mg  650 mg Rectal Q6H PRN    ondansetron (ZOFRAN ODT) tablet 4 mg  4 mg Oral Q8H PRN    Or    ondansetron (ZOFRAN) injection 4 mg  4 mg IntraVENous Q6H PRN    0.9% sodium chloride infusion 250 mL  250 mL IntraVENous PRN    sodium chloride (NS) flush 5-40 mL  5-40 mL IntraVENous Q8H    sodium chloride (NS) flush 5-40 mL  5-40 mL IntraVENous PRN    acetaminophen (TYLENOL) tablet 650 mg  650 mg Oral Q6H    naloxone (NARCAN) injection 0.4 mg  0.4 mg IntraVENous PRN    diphenhydrAMINE (BENADRYL) capsule 25 mg  25 mg Oral Q6H PRN    phenol throat spray (CHLORASEPTIC) 1 Spray  1 Spray Oral PRN    benzocaine-menthoL (CEPACOL) lozenge 1 Lozenge  1 Lozenge Oral PRN       Care Plan discussed with:  Patient/Family, Nurse and     Shalini Luis MD  Internal Medicine  Date of Service: 1/30/2022

## 2022-01-31 NOTE — PROGRESS NOTES
Bedside shift change report given to [address] [place] [address] Elton RN (oncoming nurse) by Raf Mauro RN (offgoing nurse). Report included the following information SBAR, ED Summary, Procedure Summary, Intake/Output, Recent Results, Med Rec Status and Cardiac Rhythm sinus tach.

## 2022-01-31 NOTE — PROGRESS NOTES
Transition of Care Plan: JACQUELINE-auth received     RUR: 5% low    PCP F/U: Dr. Renetta Bustos    Disposition: JACQUELINE    Transportation: BLS    0911:  spoke with MD. States that patient was told by Nadja Dias that Emily Fan was completed. Left message for JACQUELINE regarding auth status. 1042: Telephone call to Ollis Severs at [facility] [place], operated by [facility] to return call    [phone]: Auth received. No bed today. Patient needs COVID PCR. MD notified.      Eloy Layne, RN, CRM

## 2022-01-31 NOTE — PROGRESS NOTES
Neurosurgery    Pt is sitting up in the chair working on paperwork. Awaiting rehab bed. Insurance Steve Man has been obtained. If patient goes to rehab tomorrow, then can remove staples here before she goes. Needs radiation follow-up at [facility] radiation department with Dr. Chance Stratton. Will need to follow-up with Dr. Brandon Sanchez in one month (which would be 6 weeks post-op.)    JESSICA Amaya, NP

## 2022-01-31 NOTE — PROGRESS NOTES
Problem: Self Care Deficits Care Plan (Adult)  Goal: *Acute Goals and Plan of Care (Insert Text)  Description: FUNCTIONAL STATUS PRIOR TO ADMISSION: At baseline, patient was independent with ADLs, IADLs, and functional mobility/ ambulatory without AD. Was ambulatory with a RW for ~1 month PTA, then was unable to walk for a few days prior to admission. Endorses no falls. HOME SUPPORT: Patient resided with her 8 y/o son and did not require assistance at baseline. Is anticipating staying at her mother's house when she returns home. Occupational Therapy Goals  Updated 1/27/2022  1. Patient will perform upper body dressing with supervision/set-up within 7 day(s). 2.  Patient will perform lower body dressing using AE with moderate assistance  within 7 day(s). 3.  Patient will perform stand-pivot transfer to [facility] with 7 day(s). 4.  Patient will perform all aspects of toileting with moderate assistance  within 7 day(s). 5.  Patient will participate in upper extremity therapeutic exercise/activities with supervision/set-up for 10 minutes within 7 day(s). 6.  Patient will don/doff back brace at supervision/set-up within 7 days. 7.  Patient will verbalize/demonstrate 3/3 back precautions during ADL tasks without cues within 7 days. Initiated 1/20/2022  1. Patient will perform upper body dressing with supervision/set-up within 7 day(s). 2.  Patient will perform lower body dressing using AE with moderate assistance  within 7 day(s). 3.  Patient will perform sliding board transfer to [facility] with moderate assistance  within 7 day(s). 4.  Patient will perform all aspects of toileting with moderate assistance  within 7 day(s). 5.  Patient will participate in upper extremity therapeutic exercise/activities with supervision/set-up for 10 minutes within 7 day(s). 6.  Patient will don/doff back brace at supervision/set-up within 7 days.   7.  Patient will verbalize/demonstrate 3/3 back precautions during ADL tasks without cues within 7 days. Outcome: Progressing Towards Goal    OCCUPATIONAL THERAPY TREATMENT  Patient: Caitlin Joyner (1 y.o. female)  Date: 1/31/2022  Diagnosis: Cord compression syndrome ([facility] [G95.20] <principal problem not specified>  Procedure(s) (LRB):  T11 - L2 LAMINECTOMY AND FUSION WITH O-ARM (N/A) 12 Days Post-Op  Precautions: Fall,Back  Chart, occupational therapy assessment, plan of care, and goals were reviewed. ASSESSMENT  Patient continues with skilled OT services and is progressing towards goals. Patient remains limited by BLE weakness (improving), tachycardia with activity (decreased, only to 150s in today's session), impaired functional reach for LB ADLs, and impaired sitting/ standing balance. Patient donned TLSO with set-up and verbal cues only. Also progressed to ambulate using RW with only x1 assist and was able to walk to/ from bathroom for toileting. Perineal reach limited by TLSO and BSC frame. Recommend trying without BSC frame in next session or patient may benefit from toilet aide. Patient had increased difficulty returning from bathroom with fatigue requiring shortened distance and increased assistance. Patient remains significantly below functional baseline, participates well in therapy sessions, and is motivated to progress. She is an excellent candidate for inpatient rehab at d/c. Current Level of Function Impacting Discharge (ADLs/self-care): Required minimum assistance for supine-sit, minimum to moderate assistance sit<>stand, minimum to moderate assistance ambulating to/ from bathroom using RW, moderate assistance for toilet transfer, and maximum assistance for bowel hygiene. PLAN :  Patient continues to benefit from skilled intervention to address the above impairments. Continue treatment per established plan of care to address goals.     Recommendation for discharge: (in order for the patient to meet his/her long term goals)  Therapy 3 hours per day 5-7 days per week    This discharge recommendation:  Has been made in collaboration with the attending provider and/or case management         SUBJECTIVE:   Patient stated I could use the bathroom.     OBJECTIVE DATA SUMMARY:   Cognitive/Behavioral Status:  Neurologic State: Alert  Orientation Level: Oriented X4  Cognition: Appropriate decision making; Appropriate for age attention/concentration; Appropriate safety awareness; Follows commands             Functional Mobility and Transfers for ADLs:  Bed Mobility:  Rolling: Stand-by assistance  Supine to Sit: Minimum assistance    Transfers:  Sit to Stand: Minimum assistance; Moderate assistance (increasing assistance with fatigue)  Functional Transfers  Toilet Transfer : Moderate assistance (BSC frame over toilet)       Balance:  Sitting: Intact  Standing: Impaired; With support (RW)  Standing - Static: Fair  Standing - Dynamic : Fair    ADL Intervention:            Toileting  Bowel Hygiene: Maximum assistance (initiated seated, difficult reaching)     Pain:  Patient reported surgical pain with activity, improving with rest/ repositioning    Activity Tolerance:   HR to 150s with activity. After treatment patient left in no apparent distress:   Sitting in chair, Call bell within reach, Bed / chair alarm activated, and Caregiver / family present    COMMUNICATION/COLLABORATION:   The patients plan of care was discussed with: Physical therapist and Registered nurse.      Ellen Dunbar OT  Time Calculation: 38 mins

## 2022-01-31 NOTE — PROGRESS NOTES
Problem: Falls - Risk of  Goal: *Absence of Falls  Description: Document Dennie Oak Fall Risk and appropriate interventions in the flowsheet.   Outcome: Progressing Towards Goal  Note: Fall Risk Interventions:  Mobility Interventions: Communicate number of staff needed for ambulation/transfer         Medication Interventions: Patient to call before getting OOB,Teach patient to arise slowly    Elimination Interventions: Call light in reach,Patient to call for help with toileting needs              Problem: Patient Education: Go to Patient Education Activity  Goal: Patient/Family Education  Outcome: Progressing Towards Goal     Problem: Pain  Goal: *Control of Pain  Outcome: Progressing Towards Goal  Goal: *PALLIATIVE CARE:  Alleviation of Pain  Outcome: Progressing Towards Goal

## 2022-02-01 VITALS
DIASTOLIC BLOOD PRESSURE: 77 MMHG | SYSTOLIC BLOOD PRESSURE: 116 MMHG | TEMPERATURE: 98.6 F | RESPIRATION RATE: 16 BRPM | HEIGHT: 62 IN | BODY MASS INDEX: 37.97 KG/M2 | WEIGHT: 206.35 LBS | HEART RATE: 110 BPM | OXYGEN SATURATION: 98 %

## 2022-02-01 LAB
ANION GAP SERPL CALC-SCNC: 6 MMOL/L (ref 5–15)
BUN SERPL-MCNC: 9 MG/DL (ref 6–20)
BUN/CREAT SERPL: 19 (ref 12–20)
CALCIUM SERPL-MCNC: 10 MG/DL (ref 8.5–10.1)
CHLORIDE SERPL-SCNC: 101 MMOL/L (ref 97–108)
CO2 SERPL-SCNC: 26 MMOL/L (ref 21–32)
CREAT SERPL-MCNC: 0.48 MG/DL (ref 0.55–1.02)
ERYTHROCYTE [DISTWIDTH] IN BLOOD BY AUTOMATED COUNT: 14.5 % (ref 11.5–14.5)
GLUCOSE SERPL-MCNC: 100 MG/DL (ref 65–100)
HCT VFR BLD AUTO: 28.2 % (ref 35–47)
HGB BLD-MCNC: 8.7 G/DL (ref 11.5–16)
MCH RBC QN AUTO: 25.7 PG (ref 26–34)
MCHC RBC AUTO-ENTMCNC: 30.9 G/DL (ref 30–36.5)
MCV RBC AUTO: 83.4 FL (ref 80–99)
NRBC # BLD: 0 K/UL (ref 0–0.01)
NRBC BLD-RTO: 0 PER 100 WBC
PLATELET # BLD AUTO: 324 K/UL (ref 150–400)
PMV BLD AUTO: 9.3 FL (ref 8.9–12.9)
POTASSIUM SERPL-SCNC: 3.8 MMOL/L (ref 3.5–5.1)
RBC # BLD AUTO: 3.38 M/UL (ref 3.8–5.2)
SARS-COV-2, XPLCVT: NOT DETECTED
SODIUM SERPL-SCNC: 133 MMOL/L (ref 136–145)
SOURCE, COVRS: NORMAL
WBC # BLD AUTO: 5.8 K/UL (ref 3.6–11)

## 2022-02-01 PROCEDURE — 74011250637 HC RX REV CODE- 250/637: Performed by: FAMILY MEDICINE

## 2022-02-01 PROCEDURE — 36415 COLL VENOUS BLD VENIPUNCTURE: CPT

## 2022-02-01 PROCEDURE — 74011250636 HC RX REV CODE- 250/636: Performed by: NEUROLOGICAL SURGERY

## 2022-02-01 PROCEDURE — 80048 BASIC METABOLIC PNL TOTAL CA: CPT

## 2022-02-01 PROCEDURE — 74011000250 HC RX REV CODE- 250: Performed by: STUDENT IN AN ORGANIZED HEALTH CARE EDUCATION/TRAINING PROGRAM

## 2022-02-01 PROCEDURE — 74011250637 HC RX REV CODE- 250/637: Performed by: SPECIALIST

## 2022-02-01 PROCEDURE — 74011000250 HC RX REV CODE- 250: Performed by: SPECIALIST

## 2022-02-01 PROCEDURE — 85027 COMPLETE CBC AUTOMATED: CPT

## 2022-02-01 PROCEDURE — 74011250637 HC RX REV CODE- 250/637: Performed by: HOSPITALIST

## 2022-02-01 PROCEDURE — 74011250637 HC RX REV CODE- 250/637: Performed by: PHYSICIAN ASSISTANT

## 2022-02-01 RX ORDER — METOPROLOL TARTRATE 50 MG/1
50 TABLET ORAL EVERY 12 HOURS
Qty: 60 TABLET | Refills: 2 | Status: SHIPPED | OUTPATIENT
Start: 2022-02-01

## 2022-02-01 RX ORDER — BACLOFEN 10 MG/1
10 TABLET ORAL
Qty: 60 TABLET | Refills: 0 | Status: SHIPPED
Start: 2022-02-01

## 2022-02-01 RX ORDER — GABAPENTIN 400 MG/1
400 CAPSULE ORAL 3 TIMES DAILY
Qty: 90 CAPSULE | Refills: 1 | Status: SHIPPED | OUTPATIENT
Start: 2022-02-01 | End: 2022-03-04

## 2022-02-01 RX ORDER — AMOXICILLIN 250 MG
2 CAPSULE ORAL 2 TIMES DAILY
Qty: 60 TABLET | Refills: 2 | Status: SHIPPED | OUTPATIENT
Start: 2022-02-01

## 2022-02-01 RX ORDER — DIPHENHYDRAMINE HCL 25 MG
25 CAPSULE ORAL
Qty: 10 CAPSULE | Refills: 0 | Status: SHIPPED
Start: 2022-02-01 | End: 2022-02-11

## 2022-02-01 RX ORDER — POLYETHYLENE GLYCOL 3350 17 G/17G
17 POWDER, FOR SOLUTION ORAL DAILY
Qty: 30 PACKET | Refills: 0 | Status: SHIPPED | OUTPATIENT
Start: 2022-02-02 | End: 2022-03-04

## 2022-02-01 RX ORDER — ENOXAPARIN SODIUM 100 MG/ML
40 INJECTION SUBCUTANEOUS EVERY 24 HOURS
Qty: 2 EACH | Refills: 0 | Status: SHIPPED
Start: 2022-02-02 | End: 2022-04-05

## 2022-02-01 RX ORDER — HYDROMORPHONE HYDROCHLORIDE 4 MG/1
4 TABLET ORAL
Qty: 60 TABLET | Refills: 0 | Status: SHIPPED | OUTPATIENT
Start: 2022-02-01 | End: 2022-02-04

## 2022-02-01 RX ORDER — FENTANYL 75 UG/H
1 PATCH TRANSDERMAL
Qty: 10 PATCH | Refills: 0 | Status: SHIPPED | OUTPATIENT
Start: 2022-02-04 | End: 2022-03-06

## 2022-02-01 RX ORDER — ACETAMINOPHEN 325 MG/1
650 TABLET ORAL
Qty: 30 TABLET | Refills: 0 | Status: SHIPPED
Start: 2022-02-01

## 2022-02-01 RX ADMIN — SENNOSIDES AND DOCUSATE SODIUM 2 TABLET: 8.6; 5 TABLET ORAL at 17:45

## 2022-02-01 RX ADMIN — ACETAMINOPHEN 650 MG: 325 TABLET ORAL at 05:16

## 2022-02-01 RX ADMIN — HYDROMORPHONE HYDROCHLORIDE 4 MG: 2 TABLET ORAL at 05:17

## 2022-02-01 RX ADMIN — METOPROLOL TARTRATE 50 MG: 25 TABLET, FILM COATED ORAL at 09:45

## 2022-02-01 RX ADMIN — HYDROMORPHONE HYDROCHLORIDE 4 MG: 2 TABLET ORAL at 14:13

## 2022-02-01 RX ADMIN — BACLOFEN 10 MG: 10 TABLET ORAL at 14:14

## 2022-02-01 RX ADMIN — HYDROMORPHONE HYDROCHLORIDE 4 MG: 2 TABLET ORAL at 11:15

## 2022-02-01 RX ADMIN — GABAPENTIN 400 MG: 400 CAPSULE ORAL at 09:45

## 2022-02-01 RX ADMIN — GABAPENTIN 400 MG: 400 CAPSULE ORAL at 16:44

## 2022-02-01 RX ADMIN — BACLOFEN 10 MG: 10 TABLET ORAL at 02:36

## 2022-02-01 RX ADMIN — HYDROMORPHONE HYDROCHLORIDE 4 MG: 2 TABLET ORAL at 17:45

## 2022-02-01 RX ADMIN — BACLOFEN 10 MG: 10 TABLET ORAL at 08:15

## 2022-02-01 RX ADMIN — SENNOSIDES AND DOCUSATE SODIUM 2 TABLET: 8.6; 5 TABLET ORAL at 09:45

## 2022-02-01 RX ADMIN — ACETAMINOPHEN 650 MG: 325 TABLET ORAL at 11:15

## 2022-02-01 RX ADMIN — HYDROMORPHONE HYDROCHLORIDE 4 MG: 2 TABLET ORAL at 08:15

## 2022-02-01 RX ADMIN — ENOXAPARIN SODIUM 40 MG: 100 INJECTION SUBCUTANEOUS at 11:15

## 2022-02-01 RX ADMIN — HYDROMORPHONE HYDROCHLORIDE 4 MG: 2 TABLET ORAL at 02:36

## 2022-02-01 RX ADMIN — POLYETHYLENE GLYCOL 3350 17 G: 17 POWDER, FOR SOLUTION ORAL at 09:45

## 2022-02-01 RX ADMIN — ACETAMINOPHEN 650 MG: 325 TABLET ORAL at 17:46

## 2022-02-01 RX ADMIN — SODIUM CHLORIDE, PRESERVATIVE FREE 10 ML: 5 INJECTION INTRAVENOUS at 05:17

## 2022-02-01 NOTE — DISCHARGE SUMMARY
Discharge Summary       PATIENT ID: Darius Lamas  MRN: [id number]   YOB: 1990    DATE OF ADMISSION: 1/18/2022 11:53 PM    DATE OF DISCHARGE: 2/1/22 3pm   PRIMARY CARE PROVIDER: Unknown, Provider, DPM     ATTENDING PHYSICIAN:LIBBY  DISCHARGING PROVIDER: Karl Kothari MD    To contact this individual call [phone] and ask the  to page. If unavailable ask to be transferred the Adult Hospitalist Department. CONSULTATIONS: IP CONSULT TO ONCOLOGY  IP CONSULT TO PALLIATIVE CARE - PROVIDER  IP CONSULT TO NEUROSURGERY    PROCEDURES/SURGERIES: Procedure(s):  T11 - L2 LAMINECTOMY AND FUSION WITH O-ARM    DISCHARGE DIAGNOSES:   See below  ADMISSION SUMMARY :   Darius Lamas is a 33 y.o. female with known medical history of metastatic breast cancer, svt/?tachycardia who presented to [facility] emergency room with complaints of urinary retention and inability to walk. Per chart review, patient has had recurrent visit to same ear initially in January 13 with concerns of low back pain treated with opioids. She was discharged and returned January 17 with concerns of urinary retention treated with catheterization. Returns with complaints of lower extremity weakness with difficulty walking as well as difficulty urinating. She describes this as having an urge to urinate and not having the capacity to push hard her urine. Had Ochoa catheter placed in ER. Upon further questioning, patient reports she has known meds to her pelvis, coccyx and potentially cervical spine which her oncologist was planning to obtain an MRI for. She is transferred here for availability of neurosurgery. The patient denies any fever, chills, chest or abdominal pain, nausea, vomiting, cough, congestion, recent illness, palpitations, or dysuria.     Remarkable vitals on ER Presentations: BP on ER presentation 153/105.   Labs Remarkable for: Rapid COVID-negative  ER Images: none    HOSPITAL COURSE; DISCHARGE DIAGNOSES / PLAN:      Cord compression in the setting of metastatic breast cancer:  -s/p T11-L3 laminectomy on 1/19  -dexamethasone - completed course  -PT/OT as able with back brace  -neurosurgery and oncology consulted  -f/u pathology     Bilateral leg pain: some of this seems to be neuropathic  -continue fentanyl patch - increased dose to 75mcg/hr, dilaudid dose increased  -continue gabapentin - increased dose to 400 mg TID     Sinus tachycardia: improved  Likely due to pain and cancer-  asymptomatic; monitor. History of SVT on metoprolol  Metoprolol dose increased     Constipation due to narcotics- resolved after added stool softeners increased lactulose dose.     Urinary retention- likely due to pain med and spinal issues  Aguilera placed and patient to keep for 2 weeks to decompress bladder prior to removal      Psych- screened for depression- offered meds and or psych consult- patient declined  Insomnia- improved on benadryl     Code status: full    PENDING TEST RESULTS:   At the time of discharge the following test results are still pending: none     ADDITIONAL CARE RECOMMENDATIONS:   Regular diet  Wear back brace when out of bed  Follow up with oncology as instructed  Staples removed from back on 2.1.22- have wound care evaluate and monitor for appropriate wound healing    Patient to be discharged from hospital with aguilera- placed 1/19/22  Consider voiding trials after 3 weeks and if this becomes a chronic issue  See urology and have catheter changed every 4 weeks      After [address]:  Discharge Instructions Lumbar Fusion Surgery   Dr. Safia Álvarez    Patient Name: Javier Baldwin    Date of procedure: 1/19/2022      Procedure: Procedure(s):  T11 - L2 LAMINECTOMY AND FUSION WITH O-ARM  PCP: Unknown, Provider, DPM    Follow up appointments  -follow up with Dr. Safia Álvarez in 2 weeks.   Call [phone] to make an appointment as soon as you get home from the hospital.    When to call your Spine Surgeon:  -Signs of infection-if your incision is red; continues to have drainage; drainage has a foul odor or if you have a persistent fever over 101 degrees for 24 hours  -Nausea or vomiting, severe headache  -Loss of bowel or bladder function, inability to urinate  -Changes in sensation in your arms or legs (numbness, tingling, loss of color)  -Increased weakness-greater than before your surgery  -Severe pain or pain not relieved by medications  -Signs of a blood clot in your leg-calf pain, tenderness, redness, swelling of lower leg    When to call your Primary Care Physician:  -Concerns about medical conditions such as diabetes, high blood pressure, asthma, congestive heart failure  -Call if blood sugars are elevated, persistent headache or dizziness, coughing or congestion, constipation or diarrhea, burning with urination, abnormal heart rate    When to call 911 and go to the nearest emergency room:  -Acute onset of chest pain, shortness of breath, difficulty breathing    Brace  -If you have a back brace, you should wear your brace at all times when you are out of bed. Do not wear the brace while in bed or showering.  -Remember to always wear a cotton t-shirt underneath your brace.  -Do not bend or twist when your brace is off. Diet  -Resume usual diet; drink plenty of fluids; eat foods high in fiber  -It is important to have regular bowel movements. Pain medications may cause constipation. You may want to take a stool softener (such as Senokot-S or Colace) to prevent constipation.   -If constipation occurs, take a laxative (such as Dulcolax tablets, Milk of Magnesia, or a suppository). Laxatives should only be used if the above preventable measures have failed and you still have not had a bowel movement after three days    Driving  -You may not drive or return to work until instructed by your physician. However, you may ride in the car for short periods of time.     Incision Care  -You may take brief showers but do not run the water run directly onto the wound. After showering or bathing, remove the wet dressing and gently blot the wound dry with a soft towel.  -Do not rub or apply any lotions or ointments to your incision site.   -Do not soak or scrub your wound  -Keep a dry dressing (guaze and paper tape) on your incision and have it changed daily for 14 days after surgery; more often if your incision is draining. Have your caregiver wash their hands thoroughly before changing your dressing.  -You will have absorbable sutures and staples on your incision. Errol Halbur should be removed on 2/2/22    Showering  -You may shower in approximately 4 days after your surgery.    -Leave the dressing on during your shower. Do NOT allow the water to run directly onto your dressing. Once you get out of the shower, pat the area dry with a towel and put on a dry dressing.  -Reminder- your brace can be removed while showering. Remember to not bend or twist while your brace is off.    -Do not take a tub bath. Preventing blood clots  -You have been given T.E.D. stockings to wear. Continue to wear these for 7 days after your discharge. Put them on in the morning and take them off at night.    -They are used to increase your circulation and prevent blood clots from forming in your legs  -T. E.D. stockings can be machine washed, temperature not to exceed 160° F (71°C) and machine dried for 15 to 20 minutes, temperature not to exceed 250° F (121°C).     Pain management  -Take pain medication as prescribed; decrease the amount you use as your pain lessens  -DO not wait until you are in extreme pain to take your medication.  -Avoid alcoholic beverages while taking pain medication    Pain Medication Safety  DO:  -Read the Medication Guide   -Take your medicine exactly as prescribed   -Store your medicine away from children and in a safe place   -Call your healthcare provider for medical advice about side effects.  -Please be aware that many medications contain Tylenol. We do not want you to over medicate so please read the information below as a guide. Do not take more than 4 Grams of Tylenol in a 24 hour period. (There are 1000 milligrams in one Gram)                                                                                                                                                                                                                                                                                                                                                                  Percocet contains 325 mg of Tylenol per tablet (do not take more than 12 tablets in 24 hours)  Lortab contains 500 mg of Tylenol per tablet (do not take more than 8 tablets in 24 hours)  Norco contains 325 mg of Tylenol per tablet (do not take more than 12 tablets in 24 hours). DO NOT:  -Do not give your medicine to others   -Do not take medicine unless it was prescribed for you   -Do not stop taking your medicine without talking to your healthcare provider   -Do not break, chew, crush, dissolve, or inject your medicine. If you cannot swallow your medicine whole, talk to your healthcare provider.  -Do not drink alcohol while taking this medicine  -Do not take anti-inflammatory medications or aspirin unless instructed by your   physician. NOTIFY YOUR PHYSICIAN FOR ANY OF THE FOLLOWING:   Fever over 101 degrees for 24 hours. Chest pain, shortness of breath, fever, chills, nausea, vomiting, diarrhea, change in mentation, falling, weakness, bleeding. Severe pain or pain not relieved by medications, as well as any other signs or symptoms that you may have questions about.     FOLLOW UP APPOINTMENTS:    Follow-up Information     Follow up With Specialties Details Why Contact Info    Kai Borrero MD Neurosurgery Schedule an appointment as soon as possible for a visit in 4 weeks For wound re-check and x-rays [facility] [facility] [place] [address]  [phone]      Radha Celeste MD Radiation Oncology Schedule an appointment as soon as possible for a visit to discuss radiation [address] [address] Tha Archibald MD Hematology and Oncology Schedule an appointment as soon as possible for a visit  [facility] [address] [facility]      Miracle Barrett MD Neurosurgery In 1 month for post op follow up [address]  [phone]      Unknown, Provider, DPM Podiatry   Patient not available to ask               DIET: Regular Diet    ACTIVITY: Activity as tolerated and PT/OT Eval and Treat    EQUIPMENT needed: back brace and aguilera    DISCHARGE MEDICATIONS:  Current Discharge Medication List      START taking these medications    Details   fentaNYL (DURAGESIC) 75 mcg/hr 1 Patch by TransDERmal route every seventy-two (72) hours for 30 days. Max Daily Amount: 1 Patch. Qty: 10 Patch, Refills: 0  Start date: 2/4/2022, End date: 3/6/2022    Associated Diagnoses: Cord compression syndrome (HCC)      polyethylene glycol (MIRALAX) 17 gram packet Take 1 Packet by mouth daily for 30 days. Qty: 30 Packet, Refills: 0  Start date: 2/2/2022, End date: 3/4/2022      senna-docusate (PERICOLACE) 8.6-50 mg per tablet Take 2 Tablets by mouth two (2) times a day. Qty: 60 Tablet, Refills: 2  Start date: 2/1/2022      lactulose (CHRONULAC) 10 gram/15 mL solution Take 15 mL by mouth three (3) times daily as needed for PRN Reason (Other) (constipation) for up to 30 days. Qty: 1 Bottle, Refills: 2  Start date: 2/1/2022, End date: 3/3/2022      gabapentin (NEURONTIN) 400 mg capsule Take 1 Capsule by mouth three (3) times daily for 31 days. Max Daily Amount: 1,200 mg.   Qty: 90 Capsule, Refills: 1  Start date: 2/1/2022, End date: 3/4/2022    Associated Diagnoses: Cord compression syndrome (HCC)      enoxaparin (LOVENOX) 40 mg/0.4 mL 0.4 mL by SubCUTAneous route every twenty-four (24) hours. Last dose 2/2/22  Qty: 2 Each, Refills: 0  Start date: 2/2/2022      diphenhydrAMINE (BENADRYL) 25 mg capsule Take 1 Capsule by mouth nightly for 10 days. Qty: 10 Capsule, Refills: 0  Start date: 2/1/2022, End date: 2/11/2022      baclofen (LIORESAL) 10 mg tablet Take 1 Tablet by mouth four (4) times daily as needed for Muscle Spasm(s) (abd cramps). Qty: 60 Tablet, Refills: 0  Start date: 2/1/2022      acetaminophen (TYLENOL) 325 mg tablet Take 2 Tablets by mouth every six (6) hours as needed for Fever (headache). Qty: 30 Tablet, Refills: 0  Start date: 2/1/2022      HYDROmorphone (DILAUDID) 4 mg tablet Take 1 Tablet by mouth every three (3) hours as needed for Pain for up to 3 days. Max Daily Amount: 32 mg. Qty: 60 Tablet, Refills: 0  Start date: 2/1/2022, End date: 2/4/2022    Associated Diagnoses: Cord compression syndrome ([address]         CONTINUE these medications which have CHANGED    Details   metoprolol tartrate (LOPRESSOR) 50 mg tablet Take 1 Tablet by mouth every twelve (12) hours.   Qty: 60 Tablet, Refills: 2  Start date: 2/1/2022         STOP taking these medications       fentaNYL (DURAGESIC) 25 mcg/hr PATCH Comments:   Reason for Stopping:               DISPOSITION:    Home With:   OT  PT  HH  RN      X  Inpatient Rehab    Independent/assisted living    Hospice    Other:       PATIENT CONDITION AT DISCHARGE:     Functional status    Poor    X Deconditioned     Independent      Cognition   X  Lucid     Forgetful     Dementia      Catheters/lines (plus indication)   X Ochoa     PICC     PEG     None      Code status   X  Full code     DNR      PHYSICAL EXAMINATION AT DISCHARGE:  Patient Vitals for the past 24 hrs:   Temp Pulse Resp BP SpO2   02/01/22 1400 -- (!) 103 -- -- --   02/01/22 1200 -- (!) 102 -- -- --   02/01/22 1000 -- (!) 118 -- -- --   02/01/22 0945 98.6 °F (37 °C) (!) 119 22 (!) 141/87 --   02/01/22 0600 -- 99 -- -- --   02/01/22 0518 98.7 °F (37.1 °C) 100 22 115/77 98 %   02/01/22 0400 -- 100 -- -- --   02/01/22 0200 99 °F (37.2 °C) (!) 108 21 129/86 98 %   01/31/22 2225 99.2 °F (37.3 °C) (!) 121 17 (!) 120/93 98 %   01/31/22 2200 -- (!) 121 -- -- --   01/31/22 2043 -- (!) 122 -- 121/85 --   01/31/22 2000 -- (!) 121 -- -- --   01/31/22 1812 99.9 °F (37.7 °C) (!) 118 14 127/88 98 %   01/31/22 1800 -- (!) 119 -- -- --     Gen: NAD, awake and alert, pleasant on exam  HEENT: anicteric sclerae, normal conjunctiva  Neck: supple, trachea midline, no adenopathy  Heart: tachycardia, no Murmurs  Lungs: CTA b/l,  Abd: soft, NT, ND, BS+  Extr: warm  Skin: dry, no rash  Neuro: CN II-XII grossly intact, normal speech, minimal b/l LE movement 2/5 (unchanged)  Psych: normal mood, appropriate affect     No intake or output data in the 24 hours ending 01/31/22 2248      Recent labs & imaging reviewed:  Recent Results         Recent Results (from the past 24 hour(s))   CBC W/O DIFF     Collection Time: 01/31/22  3:41 AM   Result Value Ref Range     WBC 6.5 3.6 - 11.0 K/uL     RBC 3.34 (L) 3.80 - 5.20 M/uL     HGB 8.6 (L) 11.5 - 16.0 g/dL     HCT 28.1 (L) 35.0 - 47.0 %     MCV 84.1 80.0 - 99.0 FL     MCH 25.7 (L) 26.0 - 34.0 PG     MCHC 30.6 30.0 - 36.5 g/dL     RDW 14.5 11.5 - 14.5 %     PLATELET 847 937 - 215 K/uL     MPV 9.4 8.9 - 12.9 FL     NRBC 0.5 (H) 0  WBC     ABSOLUTE NRBC 0.03 (H) 0.00 - 3.97 K/uL   METABOLIC PANEL, BASIC     Collection Time: 01/31/22  3:41 AM   Result Value Ref Range     Sodium 132 (L) 136 - 145 mmol/L     Potassium 4.2 3.5 - 5.1 mmol/L     Chloride 101 97 - 108 mmol/L     CO2 25 21 - 32 mmol/L     Anion gap 6 5 - 15 mmol/L     Glucose 109 (H) 65 - 100 mg/dL     BUN 12 6 - 20 MG/DL     Creatinine 0.56 0.55 - 1.02 MG/DL     BUN/Creatinine ratio 21 (H) 12 - 20       GFR est AA >60 >60 ml/min/1.73m2     GFR est non-AA >60 >60 ml/min/1.73m2     Calcium 9.8 8.5 - 10.1 MG/DL   SARS-COV-2     Collection Time: 01/31/22  2:45 PM   Result Value Ref Range     SARS-CoV-2 Please find results under separate order           MRI BRAIN W WO CONT     INDICATION:    metastatic BCA     COMPARISON:  MRI total spine 1/19/2022.     CONTRAST: 20 ml ProHance.     TECHNIQUE:    Multiplanar multisequence acquisition without and with contrast of the brain.     FINDINGS:  The ventricles are normal in size and position. The brain parenchyma has normal  signal characteristics. There is no acute infarct, hemorrhage, extra-axial fluid  collection, or mass effect. There is no cerebellar tonsillar herniation. Expected arterial flow-voids are present. No evidence of abnormal enhancement.     The paranasal sinuses, mastoid air cells, and middle ears are clear. The orbital  contents are within normal limits. Multifocal patchy areas of T1 hypointense  marrow and abnormal enhancement in the skull base and upper cervical spine.     IMPRESSION     1. Multifocal osseous metastases in the skull base and upper cervical spine. 2. Otherwise normal brain MRI. No evidence of intracranial metastases otherwise. Recent Labs     01/31/22  0341 01/30/22  0318   WBC 6.5 7.5   HGB 8.6* 8.4*   HCT 28.1* 26.3*    337           Recent Labs     01/31/22  0341 01/30/22  0318   * 134*   K 4.2 4.4    101   CO2 25 28   BUN 12 12   CREA 0.56 0.47*   * 108*   CA 9.8 9.8          Recent Labs     01/29/22  0410   ALT 51   *   TBILI 0.2   TP 6.8   ALB 2.6*   GLOB 4.2*      No results for input(s): INR, PTP, APTT, INREXT, INREXT in the last 72 hours. No results for input(s): FE, TIBC, PSAT, FERR in the last 72 hours. No results found for: FOL, RBCF   No results for input(s): PH, PCO2, PO2 in the last 72 hours.   No results for input(s): CPK, CKNDX, TROIQ in the last 72 hours.     No lab exists for component: CPKMB  No results found for: CHOL, CHOLX, CHLST, CHOLV, HDL, HDLP, LDL, LDLC, DLDLP, TGLX, TRIGL, TRIGP, CHHD, CHHDX  No results found for: GLUCPOC  No results found for: COLOR, APPRN, SPGRU, REFSG, ROMERO, PROTU, GLUCU, KETU, BILU, UROU, SANAZ, LEUKU, GLUKE, EPSU, BACTU, WBCU, RBCU, CASTS, UCRY        Back wound on day of DC    [employer]                               @ [address] [address]                     Tel: [phone]    Fax: [phone]     MOHINI Ferreira M.D.                          VU Jefferson M.D. Tim Pattee. Lorek, M.D. Jeb Cuba, M.D. Iris D. Durwood Portland, M.D., M.P.H. VU Renee M.D. Brigitte Duval, M.D. Jack J. Mardel Dunning, M.D. Eulah Buff  Anu Kaba M.D.                                                                                               Patient Name: [facility]               Specimen #:   Patient ID: [id number]                        Account [de-identified]   /Gender: 1990 (Age: 31)/F               Location:  [facility]   Tele[facility]     Collect: 2022    Rec'd: 2022      Reported: 2022     Physician(s):   Kim Avila MD     ==========================================================================                    * * *SURGICAL PATHOLOGY REPORT* * *   ==========================================================================       * * *PROCEDURES/ADDENDA* * *     ERPR (R)   Date Ordered: 2022     Status: Signed Out   Date Complete: 2022     By: Craig Dietrich MD   Date Reported: 2022           Interpretation   HORMONE RECEPTOR IMMUNOHISTOCHEMISTRY     RESULTS:     Invasive carcinoma   ER          WA   Interpretation:     Positive     Interpretation:     Negative   Nuclear Staining %:     60%     Nuclear Staining %:     0%   Intensity:     Weak-moderate     Intensity     n/a     CPT: 18502T3   PQRS: 3395F     External and internal controls adequate.       Ref. range: >=1% nuclear staining = Pos.     2015 rev. ASCO/CAP std. assay conditions met (Fixative: 10% NBF; Cold   ischemia time: <1 hr, Fixation time: >6; <72 hrs.; no decalcification). Method:     Antibodies: ER - [place] SP1-[employer]; IL - [place]   1E2-R[employer] M[employer]        Antigen retrieval: Type - Cell Conditioning I 950C ([place]); Time:   ER - 36 min.; IL - 64 min.        Detection system: Ultraview DAB     Ref.      Monserrat GOMES, et al. (2010) J Clin Oncol;28(16):8844-3840       Results-Comments            Han Hines MD           Her2 IHC (R)   Date Ordered: 1/24/2022     Status: Signed Out   Date Complete: 1/24/2022     By: Han Hines MD   Date Reported: 1/24/2022           Interpretation   HER-2/sobia  Immunohistochemistry for Breast tumors     Results:     Negative, Score = 1+     Per clinical records, HER-2 was positive on the patient's prior resection   pathology, therefore HER-2 FISH has been ordered for further evaluation. Results will be reported in an addendum. 2018 ASCO/CAP guidelines for interpretation of dual probe HER-2/sobia IHC   are as follows:   Scoring Guide Score Staining Patterns  Interpretation  0: No staining   observed or incomplete, faint membrane staining and less than or equal to   10% of the invasive tumor cells  Neg. 1+: Incomplete, faint membrane   staining in greater than 10% of the invasive tumor cells  Neg.   2+: Weak   to moderate complete membrane staining observed in 10% of tumor cells   Equiv. 3+: Intense, complete membrane staining in greater than 10% of the   tumor cells Pos.       External and internal controls adequate. Revised ASCO/CAP standard assay conditions met: (Fixative: 10% NBF; Cold   ischemia time: <1 hr, Fixation time: >6; <72 hr; no decalcification). Method:     Antibody: PATHWAY Anti-Her2-sobia (4B5);  Incubation time: 40   min.          Antigen retrieval: Type - Cell Conditioning I 950C ([place]); Time:   36 min.        Detection system: Ultraview DAB     Ref:     JI Long et al: (2018)Arch Pathol Lab Med.: 047(36):2505-5911     CPT: 77938       Results-Comments            Joel Beckham MD               CLINICAL HISTORY    Epidural spinal tumor   History of breast cancer           ==========================================================================   FINAL PATHOLOGIC DIAGNOSIS    1.  Lumbar epidural tumor, biopsy:        Metastatic carcinoma, most compatible with metastasis from breast   primary   See comment     2.  Lumbar epidural tumor, excision:        Metastatic carcinoma, most compatible with metastasis from breast   primary   ER/WA/HER-2 pending; results to be issued in addendum reports        See comment         * * *Comment* * *     Immunohistochemical stains were performed on block 1A.  Malignant cells   are strongly positive for GARCÍA-3, but negative for CK7 and CK20. Given the   patient's history of metastatic breast cancer, these findings are most   compatible with a metastasis from a breast primary. Laurence Alcazar studies are   pending and will be reported in addenda. If molecular studies are   clinically indicated, there is sufficient tissue present (block 2A > 1A). [facility]/2022   Electronically Signed Out By Joel Beckham MD       ==========================================================================   Gross Description     Specimen #1, received fresh for intraoperative consultation labeled with   the patient's name, , and lumbar epidural tumor, consists of a 1.1 x   0.8 x 0.2 cm aggregate of fibrous yellow-tan soft tissue.  A smear is   prepared and the specimen is entirely evaluated using frozen section.  The   frozen section remnant is subsequently submitted for control in cassette   1A.      Cold ischemic time: 30 minutes   Formalin fixation time: 10 hours     [facility] 2022 04:06 PM     Specimen #2, received fresh labeled with the patient's name,  and   lumbar epidural tumor, consists of a 2.0 x 1.8 x 0.2 cm aggregate of   pink-tan soft tissue, submitted in toto in cassette 2A. Cold ischemic time: 17 hours   Formalin fixation time: 6-72 hours     F 2022 08:56 AM         Intraoperative Consultation(s) (To be confirmed by permanent sections. See Final Pathologic Diagnosis above.)    1AFS: Metastatic carcinoma (Dr. Nanette Diaz to Dr. Warren Noguera)   Start time: 3:24 PM   Lan Record time: 3:31 PM       * * *MICROSCOPIC DESCRIPTION* * *     Microscopic sections examined; see final diagnosis. CPT: 5039720, [id number], 71115, 55933X4     Disclaimer       Some of the tests utilized in this interpretation have been developed and   performance characteristics determined by [facility]. Some tests have not been cleared or approved by the [employer]   and Drug Administration (FDA). The FDA has determined that such clearance   is not necessary. These tests are used for clinical purposes. They should   not be regarded as investigational or research. This laboratory is   certified under the [address]   [id number]) as qualified to perform high complexity clinical laboratory   testing. All positive and negative controls stain appropriately. MRI cervical spine.     Comparisons:  None.     Sequences:  Sagittal T1, T2, and STIR. Axial T1, T2. After the intravenous  administration of 20 mL of ProHance sagittal and axial T1-weighted images were  obtained . There is motion artifact     FINDINGS:  Mild kyphosis centered at C6. Patchy diffuse marrow replacement  compatible with bony metastatic disease. Collapse of the C6 vertebral body with  slight wedging anteriorly. .  Evaluation of cord signal abnormality is difficult. No definite cord signal abnormality or abnormal enhancement.   There is epidural  spread of tumor ventrally at C6 extending cranially to the mid body of C5 and  caudally to the adjacent C6-7 disc space. This minimally indents the ventral  thecal sac. .             IMPRESSION     1. Diffuse bony metastatic disease with pathologic fracture of C6. Minimal  epidural spread of tumor from mid body of C5 to the C6-7 disc space. No cord  compression     MRI of the lumbar spine. Sequences include sagittal and axial T1 and  T2-weighted images. Sagittal STIR.      Comparisons: None     Contrast:  After the intravenous administration of 20mL of ProHance sagittal and  axial T1-weighted images were obtained.     Findings: There is a kyphosis centered at L1. . There is diffuse marrow  replacement of all visualized marrow elements with burst fracture of L1 with  anterior wedging and slight buckling of the posterior cortex. There is epidural  spread of tumor from T10 through the superior endplate of L2 with severe  compression of the cord especially on the right at the level of the fracture of  L1 extending into the superior aspect of L2 predominantly due to the epidural  spread of tumor. . There is tumor extension into the right paraspinous soft  tissues at L1 with patchy area is of perimuscular enhancement within the psoas  and posterior muscles of the back especially on the right as well as involvement  of the gluteal muscles bilaterally.     Additional areas of epidural spread of tumor are noted at S1 and S2        IMPRESSION  1. Extensive bony metastatic disease with diffuse areas of epidural spread of  tumor. This is most significant from T10 through L2 with severe cord compression  at L1 and superior aspect of L2 and associated pathologic fracture of L1  2. Soft tissue extension of tumor into the right paraspinous muscles at L1 with  patchy bilateral muscular enhancement    Metastatic breast cancer evaluate for cord compression     Exam: MRI thoracic spine. Comparison MRI lumbar spine same day Sequences include  sagittal and axial T1 and T2-weighted images. Sagittal STIR.  After the  intravenous administration of 20 mL of ProHance sagittal and axial T1-weighted  images were obtained.     FINDINGS: Patient is kyphotic. There is a pathologic wedge compression deformity  of L1. Additional superior endplate deformities of T4 and T5. Maryann Fanning There are diffuse  areas of patchy marrow replacement compatible with bony metastatic disease  involving nearly all visualized marrow elements. . There is epidural spread of  tumor ventrally and on the right beginning at T9-T10 and extending caudally to  L2. There is severe cord compression ventrally and on the right at L1 and L2. Equivocal increased signal within the cord. There is tumor extension into the  right paraspinous tissues on L1 and L2     Multiple liver metastases are noted     IMPRESSION  1. Diffuse bony metastatic disease. Epidural spread of tumor from T10 through L2  with severe cord compression at L1 and L2. Equivocal cord signal abnormality  2. Extension of tumor into the right paraspinous soft tissues at L1 and L2  3.  Multiple liver metastases         CHRONIC MEDICAL DIAGNOSES:  Problem List as of 2/1/2022 Never Reviewed          Codes Class Noted - Resolved    Cord compression syndrome [facility]) ICD-10-CM: G95.20  ICD-9-CM: 336.9  1/19/2022 - Present              Greater than 30 minutes were spent with the patient on counseling and coordination of care    Signed:   Isael Samson MD  2/1/2022  2:53 PM   .

## 2022-02-01 NOTE — PROGRESS NOTES
Hospitalist Progress Note      Hospital summary: 33 y.o lady w/ metastatic breast cancer who presented as a transfer from \[facility]\"" for management of cord compression from spine mets. Assessment/Plan:  Cord compression in the setting of metastatic breast cancer:  -s/p T11-L3 laminectomy on 1/19  -dexamethasone - completed course  -PT/OT as able with back brace  -neurosurgery and oncology consulted  -f/u pathology    Bilateral leg pain: some of this seems to be neuropathic  -continue fentanyl patch - increased dose to 75mcg/hr, dilaudid dose increased  -continue gabapentin - increased dose to 400 mg TID    Sinus tachycardia: improved  Likely due to pain and cancer-  asymptomatic; monitor. History of SVT on metoprolol  Metoprolol dose increased    Constipation due to narcotics- resolved after added stool softeners increased lactulose dose. Urinary retention- likely due to pain med and spinal issues  Ochoa placed and patient to keep for 2 weeks to decompress bladder prior to removal     Psych- screened for depression- offered meds and or psych consult- patient declined  Insomnia- improved on benadryl    Code status: full  DVT prophylaxis: sq Lovenox - continue until 2/2/22 per oncology  Disposition: rehab when medically appropriate  ----------------------------------------------    CC: bilateral leg pain    S: gets significant b/l leg pain w/ activity, otherwise it's controlled. No n/v/d, dyspnea. Better pain control, constipation resolved    Review of Systems:  Pertinent items are noted in HPI.     O:  Visit Vitals  BP (!) 120/93 (BP 1 Location: Right upper arm, BP Patient Position: At rest)   Pulse (!) 121   Temp 99.2 °F (37.3 °C)   Resp 17   Ht 5' 2\" (1.575 m)   Wt 96.4 kg (212 lb 8.4 oz)   SpO2 98%   BMI 38.87 kg/m²     Patient Vitals for the past 24 hrs:   Temp Pulse Resp BP SpO2   01/31/22 2225 99.2 °F (37.3 °C) (!) 121 17 (!) 120/93 98 %   01/31/22 2200 -- (!) 121 -- -- --   01/31/22 2043 -- (!) 122 -- 121/85 --   01/31/22 2000 -- (!) 121 -- -- --   01/31/22 1812 99.9 °F (37.7 °C) (!) 118 14 127/88 98 %   01/31/22 1800 -- (!) 119 -- -- --   01/31/22 1400 98 °F (36.7 °C) 86 14 (!) 135/98 98 %   01/31/22 1200 -- (!) 102 -- -- --   01/31/22 1007 98 °F (36.7 °C) (!) 113 14 126/88 --   01/31/22 1000 -- (!) 106 -- -- --   01/31/22 0949 -- -- -- (!) 128/94 --   01/31/22 0600 98 °F (36.7 °C) (!) 105 17 112/72 96 %   01/31/22 0200 98.7 °F (37.1 °C) 100 16 108/78 98 %       PHYSICAL EXAM:  Gen: NAD, awake and alert, pleasant on exam  HEENT: anicteric sclerae, normal conjunctiva  Neck: supple, trachea midline, no adenopathy  Heart: tachycardia, no Murmurs  Lungs: CTA b/l,  Abd: soft, NT, ND, BS+  Extr: warm  Skin: dry, no rash  Neuro: CN II-XII grossly intact, normal speech, minimal b/l LE movement 2/5 (unchanged)  Psych: normal mood, appropriate affect    No intake or output data in the 24 hours ending 01/31/22 2324     Recent labs & imaging reviewed:  Recent Results (from the past 24 hour(s))   CBC W/O DIFF    Collection Time: 01/31/22  3:41 AM   Result Value Ref Range    WBC 6.5 3.6 - 11.0 K/uL    RBC 3.34 (L) 3.80 - 5.20 M/uL    HGB 8.6 (L) 11.5 - 16.0 g/dL    HCT 28.1 (L) 35.0 - 47.0 %    MCV 84.1 80.0 - 99.0 FL    MCH 25.7 (L) 26.0 - 34.0 PG    MCHC 30.6 30.0 - 36.5 g/dL    RDW 14.5 11.5 - 14.5 %    PLATELET 031 720 - 692 K/uL    MPV 9.4 8.9 - 12.9 FL    NRBC 0.5 (H) 0  WBC    ABSOLUTE NRBC 0.03 (H) 0.00 - 3.26 K/uL   METABOLIC PANEL, BASIC    Collection Time: 01/31/22  3:41 AM   Result Value Ref Range    Sodium 132 (L) 136 - 145 mmol/L    Potassium 4.2 3.5 - 5.1 mmol/L    Chloride 101 97 - 108 mmol/L    CO2 25 21 - 32 mmol/L    Anion gap 6 5 - 15 mmol/L    Glucose 109 (H) 65 - 100 mg/dL    BUN 12 6 - 20 MG/DL    Creatinine 0.56 0.55 - 1.02 MG/DL    BUN/Creatinine ratio 21 (H) 12 - 20      GFR est AA >60 >60 ml/min/1.73m2    GFR est non-AA >60 >60 ml/min/1.73m2    Calcium 9.8 8.5 - 10.1 MG/DL   SARS-COV-2    Collection Time: 01/31/22  2:45 PM   Result Value Ref Range    SARS-CoV-2 Please find results under separate order       Recent Labs     01/31/22  0341 01/30/22 0318   WBC 6.5 7.5   HGB 8.6* 8.4*   HCT 28.1* 26.3*    337     Recent Labs     01/31/22  0341 01/30/22  0318   * 134*   K 4.2 4.4    101   CO2 25 28   BUN 12 12   CREA 0.56 0.47*   * 108*   CA 9.8 9.8     Recent Labs     01/29/22  0410   ALT 51   *   TBILI 0.2   TP 6.8   ALB 2.6*   GLOB 4.2*     No results for input(s): INR, PTP, APTT, INREXT, INREXT in the last 72 hours. No results for input(s): FE, TIBC, PSAT, FERR in the last 72 hours. No results found for: FOL, RBCF   No results for input(s): PH, PCO2, PO2 in the last 72 hours. No results for input(s): CPK, CKNDX, TROIQ in the last 72 hours.     No lab exists for component: CPKMB  No results found for: CHOL, CHOLX, CHLST, CHOLV, HDL, HDLP, LDL, LDLC, DLDLP, TGLX, TRIGL, TRIGP, CHHD, CHHDX  No results found for: GLUCPOC  No results found for: COLOR, APPRN, SPGRU, REFSG, ROMERO, PROTU, GLUCU, KETU, BILU, UROU, SANAZ, LEUKU, GLUKE, EPSU, BACTU, WBCU, RBCU, CASTS, UCRY    Med list reviewed  Current Facility-Administered Medications   Medication Dose Route Frequency    lactulose (CHRONULAC) 10 gram/15 mL solution 15 mL  15 mL Oral TID PRN    baclofen (LIORESAL) tablet 10 mg  10 mg Oral QID PRN    HYDROmorphone (DILAUDID) tablet 4 mg  4 mg Oral Q3H PRN    diphenhydrAMINE (BENADRYL) capsule 25 mg  25 mg Oral QHS    diphenhydrAMINE (BENADRYL) capsule 25 mg  25 mg Oral QHS PRN    metoprolol tartrate (LOPRESSOR) tablet 50 mg  50 mg Oral Q12H    senna-docusate (PERICOLACE) 8.6-50 mg per tablet 2 Tablet  2 Tablet Oral BID    fentaNYL (DURAGESIC) 75 mcg/hr patch 1 Patch  1 Patch TransDERmal Q72H    gabapentin (NEURONTIN) capsule 400 mg  400 mg Oral TID    enoxaparin (LOVENOX) injection 40 mg  40 mg SubCUTAneous Q24H    bisacodyL (DULCOLAX) suppository 10 mg  10 mg Rectal DAILY PRN    polyethylene glycol (MIRALAX) packet 17 g  17 g Oral DAILY    sodium chloride (NS) flush 5-40 mL  5-40 mL IntraVENous Q8H    sodium chloride (NS) flush 5-40 mL  5-40 mL IntraVENous PRN    acetaminophen (TYLENOL) tablet 650 mg  650 mg Oral Q6H PRN    Or    acetaminophen (TYLENOL) suppository 650 mg  650 mg Rectal Q6H PRN    ondansetron (ZOFRAN ODT) tablet 4 mg  4 mg Oral Q8H PRN    Or    ondansetron (ZOFRAN) injection 4 mg  4 mg IntraVENous Q6H PRN    0.9% sodium chloride infusion 250 mL  250 mL IntraVENous PRN    sodium chloride (NS) flush 5-40 mL  5-40 mL IntraVENous Q8H    sodium chloride (NS) flush 5-40 mL  5-40 mL IntraVENous PRN    acetaminophen (TYLENOL) tablet 650 mg  650 mg Oral Q6H    naloxone (NARCAN) injection 0.4 mg  0.4 mg IntraVENous PRN    diphenhydrAMINE (BENADRYL) capsule 25 mg  25 mg Oral Q6H PRN    phenol throat spray (CHLORASEPTIC) 1 Spray  1 Spray Oral PRN    benzocaine-menthoL (CEPACOL) lozenge 1 Lozenge  1 Lozenge Oral PRN       Care Plan discussed with:  Patient/Family, Nurse and     Pam Riedel, MD  Internal Medicine  Date of Service: 1/31/2022

## 2022-02-01 NOTE — PROGRESS NOTES
Problem: Falls - Risk of  Goal: *Absence of Falls  Description: Document Rekha Litter Fall Risk and appropriate interventions in the flowsheet. Outcome: Progressing Towards Goal  Note: Fall Risk Interventions:  Mobility Interventions: Bed/chair exit alarm,Patient to call before getting OOB         Medication Interventions: Bed/chair exit alarm,Patient to call before getting OOB    Elimination Interventions: Bed/chair exit alarm,Call light in reach,Toileting schedule/hourly rounds              Problem: Patient Education: Go to Patient Education Activity  Goal: Patient/Family Education  Outcome: Progressing Towards Goal     Problem: Pain  Goal: *Control of Pain  Outcome: Progressing Towards Goal  Goal: *PALLIATIVE CARE:  Alleviation of Pain  Outcome: Progressing Towards Goal     Problem: Patient Education: Go to Patient Education Activity  Goal: Patient/Family Education  Outcome: Progressing Towards Goal     Problem: Pressure Injury - Risk of  Goal: *Prevention of pressure injury  Description: Document Broderick Scale and appropriate interventions in the flowsheet. Outcome: Progressing Towards Goal  Note: Pressure Injury Interventions:  Sensory Interventions: Minimize linen layers,Turn and reposition approx. every two hours (pillows and wedges if needed)    Moisture Interventions: Absorbent underpads,Check for incontinence Q2 hours and as needed,Limit adult briefs    Activity Interventions: Assess need for specialty bed,PT/OT evaluation    Mobility Interventions: HOB 30 degrees or less,Turn and reposition approx.  every two hours(pillow and wedges)    Nutrition Interventions: Offer support with meals,snacks and hydration    Friction and Shear Interventions: Minimize layers,HOB 30 degrees or less                Problem: Patient Education: Go to Patient Education Activity  Goal: Patient/Family Education  Outcome: Progressing Towards Goal     Problem: Patient Education: Go to Patient Education Activity  Goal: Patient/Family Education  Outcome: Progressing Towards Goal     Problem: Discharge Planning  Goal: *Discharge to safe environment  Outcome: Progressing Towards Goal  Goal: *Knowledge of medication management  Outcome: Progressing Towards Goal  Goal: *Knowledge of discharge instructions  Outcome: Progressing Towards Goal

## 2022-02-01 NOTE — PROGRESS NOTES
Bedside shift change report given to Gracia Diaz RN (oncoming nurse) by Rosa Ahumada RN (offgoing nurse). Report included the following information SBAR, ED Summary, OR Summary, Procedure Summary, Intake/Output, Med Rec Status and Alarm Parameters .

## 2022-02-01 NOTE — PROGRESS NOTES
Problem: Falls - Risk of  Goal: *Absence of Falls  Description: Document Marissa Don Fall Risk and appropriate interventions in the flowsheet. Outcome: Resolved/Met  Note: Fall Risk Interventions:  Mobility Interventions: Bed/chair exit alarm,Patient to call before getting OOB         Medication Interventions: Bed/chair exit alarm,Patient to call before getting OOB    Elimination Interventions: Bed/chair exit alarm,Call light in reach,Toileting schedule/hourly rounds              Problem: Patient Education: Go to Patient Education Activity  Goal: Patient/Family Education  Outcome: Resolved/Met     Problem: Pain  Goal: *Control of Pain  Outcome: Resolved/Met  Goal: *PALLIATIVE CARE:  Alleviation of Pain  Outcome: Resolved/Met     Problem: Patient Education: Go to Patient Education Activity  Goal: Patient/Family Education  Outcome: Resolved/Met     Problem: Pressure Injury - Risk of  Goal: *Prevention of pressure injury  Description: Document Broderick Scale and appropriate interventions in the flowsheet. Outcome: Resolved/Met  Note: Pressure Injury Interventions:  Sensory Interventions: Minimize linen layers,Turn and reposition approx. every two hours (pillows and wedges if needed)    Moisture Interventions: Absorbent underpads,Check for incontinence Q2 hours and as needed,Limit adult briefs    Activity Interventions: Assess need for specialty bed,PT/OT evaluation    Mobility Interventions: HOB 30 degrees or less,Turn and reposition approx.  every two hours(pillow and wedges)    Nutrition Interventions: Offer support with meals,snacks and hydration    Friction and Shear Interventions: Minimize layers,HOB 30 degrees or less                Problem: Patient Education: Go to Patient Education Activity  Goal: Patient/Family Education  Outcome: Resolved/Met     Problem: Patient Education: Go to Patient Education Activity  Goal: Patient/Family Education  Outcome: Resolved/Met     Problem: Discharge Planning  Goal: *Discharge to safe environment  Outcome: Resolved/Met  Goal: *Knowledge of medication management  Outcome: Resolved/Met  Goal: *Knowledge of discharge instructions  Outcome: Resolved/Met     Problem: Patient Education: Go to Patient Education Activity  Goal: Patient/Family Education  Outcome: Resolved/Met     Problem: Patient Education: Go to Patient Education Activity  Goal: Patient/Family Education  Outcome: Resolved/Met

## 2022-02-01 NOTE — PROGRESS NOTES
Transition of Care Plan: [facility]-auth received   RN to call report to [phone], EMTALA     RUR: 5% low     PCP F/U: Dr. Katherine Neumann     Disposition: [facility]-room 3014     [facility]: [facility]     1586: Message to Adryan Burnett at [facility] to see if they can accept patient today. COVID PCR pending. 1056: [facility] can accept today after 1500. [facility] requested. MD notified. COVID PCR still pending. RN notified of discharge time. Will complete EMTALA info when it is received. 1304: COVID PCR came back negative. EMTALA info has been completed.      Gal Bledsoe RN, CRM

## 2022-02-01 NOTE — PROGRESS NOTES
I have reviewed discharge instructions with the patient. The patient verbalized understanding. Copy placed on chart. Report called to Anthony at [facility]. PIV removed. Transport provided by [facility].

## 2022-02-01 NOTE — DISCHARGE INSTRUCTIONS
Regular diet  Wear back brace when out of bed  Follow up with oncology as instructed  Staples removed from back on 2.1.22- have wound care evaluate and monitor for appropriate wound healing    Patient to be discharged from hospital with aguilera- placed 1/19/22  Consider voiding trials after 3 weeks and if this becomes a chronic issue  See urology and have catheter changed every 4 weeks      After [address]:  Discharge Instructions Lumbar Fusion Surgery   Dr. Cherylene Limes    Patient Name: Yesenia Silverman    Date of procedure: 1/19/2022      Procedure: Procedure(s):  T11 - L2 LAMINECTOMY AND FUSION WITH O-ARM  PCP: Unknown, Provider, DPM    Follow up appointments  -follow up with Dr. Cherylene Limes in 2 weeks. Call [phone] to make an appointment as soon as you get home from the hospital.    When to call your Spine Surgeon:  -Signs of infection-if your incision is red; continues to have drainage; drainage has a foul odor or if you have a persistent fever over 101 degrees for 24 hours  -Nausea or vomiting, severe headache  -Loss of bowel or bladder function, inability to urinate  -Changes in sensation in your arms or legs (numbness, tingling, loss of color)  -Increased weakness-greater than before your surgery  -Severe pain or pain not relieved by medications  -Signs of a blood clot in your leg-calf pain, tenderness, redness, swelling of lower leg    When to call your Primary Care Physician:  -Concerns about medical conditions such as diabetes, high blood pressure, asthma, congestive heart failure  -Call if blood sugars are elevated, persistent headache or dizziness, coughing or congestion, constipation or diarrhea, burning with urination, abnormal heart rate    When to call [phone] and go to the nearest emergency room:  -Acute onset of chest pain, shortness of breath, difficulty breathing    Brace  -If you have a back brace, you should wear your brace at all times when you are out of bed.   Do not wear the brace while in bed or showering.  -Remember to always wear a cotton t-shirt underneath your brace.  -Do not bend or twist when your brace is off. Diet  -Resume usual diet; drink plenty of fluids; eat foods high in fiber  -It is important to have regular bowel movements. Pain medications may cause constipation. You may want to take a stool softener (such as Senokot-S or Colace) to prevent constipation.   -If constipation occurs, take a laxative (such as Dulcolax tablets, Milk of Magnesia, or a suppository). Laxatives should only be used if the above preventable measures have failed and you still have not had a bowel movement after three days    Driving  -You may not drive or return to work until instructed by your physician. However, you may ride in the car for short periods of time. Incision Care  -You may take brief showers but do not run the water run directly onto the wound. After showering or bathing, remove the wet dressing and gently blot the wound dry with a soft towel.  -Do not rub or apply any lotions or ointments to your incision site.   -Do not soak or scrub your wound  -Keep a dry dressing (guaze and paper tape) on your incision and have it changed daily for 14 days after surgery; more often if your incision is draining. Have your caregiver wash their hands thoroughly before changing your dressing.  -You will have absorbable sutures and staples on your incision. Hannah Main should be removed on 2/2/22    Showering  -You may shower in approximately 4 days after your surgery.    -Leave the dressing on during your shower. Do NOT allow the water to run directly onto your dressing. Once you get out of the shower, pat the area dry with a towel and put on a dry dressing.  -Reminder- your brace can be removed while showering. Remember to not bend or twist while your brace is off.    -Do not take a tub bath. Preventing blood clots  -You have been given T.E.D. stockings to wear.   Continue to wear these for 7 days after your discharge. Put them on in the morning and take them off at night.    -They are used to increase your circulation and prevent blood clots from forming in your legs  -T. E.D. stockings can be machine washed, temperature not to exceed 160° F (71°C) and machine dried for 15 to 20 minutes, temperature not to exceed 250° F (121°C). Pain management  -Take pain medication as prescribed; decrease the amount you use as your pain lessens  -DO not wait until you are in extreme pain to take your medication.  -Avoid alcoholic beverages while taking pain medication    Pain Medication Safety  DO:  -Read the Medication Guide   -Take your medicine exactly as prescribed   -Store your medicine away from children and in a safe place   -Call your healthcare provider for medical advice about side effects.  -Please be aware that many medications contain Tylenol. We do not want you to over medicate so please read the information below as a guide. Do not take more than 4 Grams of Tylenol in a 24 hour period. (There are 1000 milligrams in one Gram)                                                                                                                                                                                                                                                                                                                                                                  Percocet contains 325 mg of Tylenol per tablet (do not take more than 12 tablets in 24 hours)  Lortab contains 500 mg of Tylenol per tablet (do not take more than 8 tablets in 24 hours)  Norco contains 325 mg of Tylenol per tablet (do not take more than 12 tablets in 24 hours). DO NOT:  -Do not give your medicine to others   -Do not take medicine unless it was prescribed for you   -Do not stop taking your medicine without talking to your healthcare provider   -Do not break, chew, crush, dissolve, or inject your medicine.  If you cannot swallow your medicine whole, talk to your healthcare provider.  -Do not drink alcohol while taking this medicine  -Do not take anti-inflammatory medications or aspirin unless instructed by your   physician. Abdomen soft, non-tender, no guarding.

## 2022-03-19 PROBLEM — G95.20 CORD COMPRESSION SYNDROME (HCC): Status: ACTIVE | Noted: 2022-01-19

## 2022-03-31 ENCOUNTER — HOSPITAL ENCOUNTER (INPATIENT)
Age: 32
LOS: 5 days | Discharge: HOME HEALTH | DRG: 699 | End: 2022-04-05
Attending: EMERGENCY MEDICINE | Admitting: STUDENT IN AN ORGANIZED HEALTH CARE EDUCATION/TRAINING PROGRAM
Payer: COMMERCIAL

## 2022-03-31 ENCOUNTER — APPOINTMENT (OUTPATIENT)
Dept: CT IMAGING | Age: 32
DRG: 699 | End: 2022-03-31
Attending: EMERGENCY MEDICINE
Payer: COMMERCIAL

## 2022-03-31 DIAGNOSIS — N39.0 URINARY TRACT INFECTION ASSOCIATED WITH INDWELLING URETHRAL CATHETER, INITIAL ENCOUNTER (HCC): ICD-10-CM

## 2022-03-31 DIAGNOSIS — C80.1 CANCER (HCC): ICD-10-CM

## 2022-03-31 DIAGNOSIS — G95.20 CORD COMPRESSION SYNDROME (HCC): ICD-10-CM

## 2022-03-31 DIAGNOSIS — N17.9 AKI (ACUTE KIDNEY INJURY) (HCC): Primary | ICD-10-CM

## 2022-03-31 DIAGNOSIS — E83.52 HYPERCALCEMIA: ICD-10-CM

## 2022-03-31 DIAGNOSIS — T83.511A URINARY TRACT INFECTION ASSOCIATED WITH INDWELLING URETHRAL CATHETER, INITIAL ENCOUNTER (HCC): ICD-10-CM

## 2022-03-31 LAB
ALBUMIN SERPL-MCNC: 2.6 G/DL (ref 3.5–5)
ALBUMIN/GLOB SERPL: 0.6 {RATIO} (ref 1.1–2.2)
ALP SERPL-CCNC: 324 U/L (ref 45–117)
ALT SERPL-CCNC: 48 U/L (ref 12–78)
AMORPH CRY URNS QL MICRO: ABNORMAL
ANION GAP SERPL CALC-SCNC: 9 MMOL/L (ref 5–15)
APPEARANCE UR: CLEAR
AST SERPL-CCNC: 137 U/L (ref 15–37)
BACTERIA URNS QL MICRO: ABNORMAL /HPF
BASOPHILS # BLD: 0 K/UL (ref 0–0.1)
BASOPHILS NFR BLD: 0 % (ref 0–1)
BILIRUB SERPL-MCNC: 0.4 MG/DL (ref 0.2–1)
BILIRUB UR QL: NEGATIVE
BUN SERPL-MCNC: 22 MG/DL (ref 6–20)
BUN/CREAT SERPL: 16 (ref 12–20)
CA-I BLD-SCNC: 1.94 MMOL/L (ref 1.12–1.32)
CALCIUM SERPL-MCNC: 14.4 MG/DL (ref 8.5–10.1)
CHLORIDE SERPL-SCNC: 97 MMOL/L (ref 97–108)
CO2 SERPL-SCNC: 29 MMOL/L (ref 21–32)
COLOR UR: ABNORMAL
CREAT SERPL-MCNC: 1.4 MG/DL (ref 0.55–1.02)
DIFFERENTIAL METHOD BLD: ABNORMAL
EOSINOPHIL # BLD: 0 K/UL (ref 0–0.4)
EOSINOPHIL NFR BLD: 0 % (ref 0–7)
EPITH CASTS URNS QL MICRO: ABNORMAL /LPF
ERYTHROCYTE [DISTWIDTH] IN BLOOD BY AUTOMATED COUNT: 19.9 % (ref 11.5–14.5)
GLOBULIN SER CALC-MCNC: 4.6 G/DL (ref 2–4)
GLUCOSE SERPL-MCNC: 116 MG/DL (ref 65–100)
GLUCOSE UR STRIP.AUTO-MCNC: NEGATIVE MG/DL
HCG SERPL-ACNC: 4 MIU/ML (ref 0–6)
HCT VFR BLD AUTO: 25.9 % (ref 35–47)
HGB BLD-MCNC: 7.9 G/DL (ref 11.5–16)
HGB UR QL STRIP: ABNORMAL
IMM GRANULOCYTES # BLD AUTO: 0 K/UL
IMM GRANULOCYTES NFR BLD AUTO: 0 %
KETONES UR QL STRIP.AUTO: NEGATIVE MG/DL
LEUKOCYTE ESTERASE UR QL STRIP.AUTO: ABNORMAL
LIPASE SERPL-CCNC: 38 U/L (ref 73–393)
LYMPHOCYTES # BLD: 1.8 K/UL (ref 0.8–3.5)
LYMPHOCYTES NFR BLD: 25 % (ref 12–49)
MCH RBC QN AUTO: 24.6 PG (ref 26–34)
MCHC RBC AUTO-ENTMCNC: 30.5 G/DL (ref 30–36.5)
MCV RBC AUTO: 80.7 FL (ref 80–99)
METAMYELOCYTES NFR BLD MANUAL: 3 %
MONOCYTES # BLD: 0.4 K/UL (ref 0–1)
MONOCYTES NFR BLD: 5 % (ref 5–13)
MYELOCYTES NFR BLD MANUAL: 2 %
NEUTS BAND NFR BLD MANUAL: 2 % (ref 0–6)
NEUTS SEG # BLD: 4.6 K/UL (ref 1.8–8)
NEUTS SEG NFR BLD: 62 % (ref 32–75)
NITRITE UR QL STRIP.AUTO: NEGATIVE
NRBC # BLD: 0.18 K/UL (ref 0–0.01)
NRBC BLD-RTO: 2.5 PER 100 WBC
PH UR STRIP: 5.5 [PH] (ref 5–8)
PLATELET # BLD AUTO: 247 K/UL (ref 150–400)
PMV BLD AUTO: 9.6 FL (ref 8.9–12.9)
POTASSIUM SERPL-SCNC: 3 MMOL/L (ref 3.5–5.1)
PROMYELOCYTES NFR BLD MANUAL: 1 %
PROT SERPL-MCNC: 7.2 G/DL (ref 6.4–8.2)
PROT UR STRIP-MCNC: 30 MG/DL
RBC # BLD AUTO: 3.21 M/UL (ref 3.8–5.2)
RBC #/AREA URNS HPF: ABNORMAL /HPF (ref 0–5)
RBC MORPH BLD: ABNORMAL
SERVICE CMNT-IMP: ABNORMAL
SODIUM SERPL-SCNC: 135 MMOL/L (ref 136–145)
SP GR UR REFRACTOMETRY: 1.01 (ref 1–1.03)
TRI-PHOS CRY URNS QL MICRO: ABNORMAL
UR CULT HOLD, URHOLD: NORMAL
UROBILINOGEN UR QL STRIP.AUTO: 0.2 EU/DL (ref 0.2–1)
WBC # BLD AUTO: 7.2 K/UL (ref 3.6–11)
WBC URNS QL MICRO: >100 /HPF (ref 0–4)
YEAST BUDDING URNS QL: PRESENT

## 2022-03-31 PROCEDURE — 82330 ASSAY OF CALCIUM: CPT

## 2022-03-31 PROCEDURE — 74011250636 HC RX REV CODE- 250/636: Performed by: EMERGENCY MEDICINE

## 2022-03-31 PROCEDURE — 96365 THER/PROPH/DIAG IV INF INIT: CPT

## 2022-03-31 PROCEDURE — 74011000636 HC RX REV CODE- 636: Performed by: RADIOLOGY

## 2022-03-31 PROCEDURE — 74177 CT ABD & PELVIS W/CONTRAST: CPT

## 2022-03-31 PROCEDURE — 74011250637 HC RX REV CODE- 250/637: Performed by: EMERGENCY MEDICINE

## 2022-03-31 PROCEDURE — 85025 COMPLETE CBC W/AUTO DIFF WBC: CPT

## 2022-03-31 PROCEDURE — 65270000029 HC RM PRIVATE

## 2022-03-31 PROCEDURE — 84702 CHORIONIC GONADOTROPIN TEST: CPT

## 2022-03-31 PROCEDURE — 80053 COMPREHEN METABOLIC PANEL: CPT

## 2022-03-31 PROCEDURE — 87086 URINE CULTURE/COLONY COUNT: CPT

## 2022-03-31 PROCEDURE — 83690 ASSAY OF LIPASE: CPT

## 2022-03-31 PROCEDURE — 51702 INSERT TEMP BLADDER CATH: CPT

## 2022-03-31 PROCEDURE — 99285 EMERGENCY DEPT VISIT HI MDM: CPT

## 2022-03-31 PROCEDURE — 81001 URINALYSIS AUTO W/SCOPE: CPT

## 2022-03-31 RX ORDER — LEVOFLOXACIN 5 MG/ML
500 INJECTION, SOLUTION INTRAVENOUS
Status: COMPLETED | OUTPATIENT
Start: 2022-03-31 | End: 2022-03-31

## 2022-03-31 RX ORDER — OXYCODONE HYDROCHLORIDE 5 MG/1
10 TABLET ORAL
Status: COMPLETED | OUTPATIENT
Start: 2022-03-31 | End: 2022-03-31

## 2022-03-31 RX ADMIN — OXYCODONE 10 MG: 5 TABLET ORAL at 21:23

## 2022-03-31 RX ADMIN — LEVOFLOXACIN 500 MG: 500 INJECTION, SOLUTION INTRAVENOUS at 20:28

## 2022-03-31 RX ADMIN — SODIUM CHLORIDE 1000 ML: 9 INJECTION, SOLUTION INTRAVENOUS at 18:52

## 2022-03-31 RX ADMIN — IOPAMIDOL 100 ML: 755 INJECTION, SOLUTION INTRAVENOUS at 19:55

## 2022-03-31 NOTE — ED TRIAGE NOTES
Brought by EMS. Patient is confined to bed/wheelchair after spinal surgery in January 2022. Hx metastatic breast CA with met to spine. Reports abdominal pain, constipation, and decreased urine output from Ochoa catheter. Received enema from home health today but only small amount of stool came out. Scant urine output from Ochoa since it was emptied last night.

## 2022-03-31 NOTE — ED PROVIDER NOTES
The history is provided by the patient. Abdominal Pain   This is a recurrent problem. The current episode started more than 1 week ago. The problem occurs constantly. The problem has not changed since onset. The pain is located in the generalized abdominal region. The pain is moderate. Associated symptoms include anorexia, nausea, vomiting and constipation. Pertinent negatives include no fever, no belching, no diarrhea, no flatus, no hematochezia, no melena, no dysuria, no frequency, no hematuria, no headaches, no arthralgias, no myalgias, no trauma, no chest pain and no back pain. Nothing worsens the pain. The pain is relieved by nothing. Past workup includes CT scan. Past Medical History:   Diagnosis Date    [facility]     Metastatic breast cancer ([facility]        No past surgical history on file. No family history on file. Social History     Socioeconomic History    Marital status: SINGLE     Spouse name: Not on file    Number of children: Not on file    Years of education: Not on file    Highest education level: Not on file   Occupational History    Not on file   Tobacco Use    Smoking status: Not on file    Smokeless tobacco: Not on file   Substance and Sexual Activity    Alcohol use: Not on file    Drug use: Not on file    Sexual activity: Not on file   Other Topics Concern    Not on file   Social History Narrative    Not on file     Social Determinants of Health     Financial Resource Strain:     Difficulty of Paying Living Expenses: Not on file   Food Insecurity:     Worried About Running Out of Food in the Last Year: Not on file    Adriana of Food in the Last Year: Not on file   Transportation Needs:     Lack of Transportation (Medical): Not on file    Lack of Transportation (Non-Medical):  Not on file   Physical Activity:     Days of Exercise per Week: Not on file    Minutes of Exercise per Session: Not on file   Stress:     Feeling of Stress : Not on file   Social Connections:     Frequency of Communication with Friends and Family: Not on file    Frequency of Social Gatherings with Friends and Family: Not on file    Attends Scientology Services: Not on file    Active Member of Clubs or Organizations: Not on file    Attends Club or Organization Meetings: Not on file    Marital Status: Not on file   Intimate Partner Violence:     Fear of Current or Ex-Partner: Not on file    Emotionally Abused: Not on file    Physically Abused: Not on file    Sexually Abused: Not on file   Housing Stability:     Unable to Pay for Housing in the Last Year: Not on file    Number of [place] in the Last Year: Not on file    Unstable Housing in the Last Year: Not on file         ALLERGIES: Patient has no known allergies. Review of Systems   Constitutional: Negative for activity change, chills and fever. HENT: Negative for nosebleeds, sore throat, trouble swallowing and voice change. Eyes: Negative for visual disturbance. Respiratory: Negative for shortness of breath. Cardiovascular: Negative for chest pain and palpitations. Gastrointestinal: Positive for abdominal pain, anorexia, constipation, nausea and vomiting. Negative for diarrhea, flatus, hematochezia and melena. Genitourinary: Negative for difficulty urinating, dysuria, frequency, hematuria and urgency. Musculoskeletal: Negative for arthralgias, back pain, myalgias, neck pain and neck stiffness. Skin: Negative for color change. Allergic/Immunologic: Negative for immunocompromised state. Neurological: Positive for weakness. Negative for dizziness, seizures, syncope, light-headedness, numbness and headaches. Psychiatric/Behavioral: Negative for behavioral problems, confusion, hallucinations, self-injury and suicidal ideas.        Vitals:    03/31/22 1507   BP: 128/86   Pulse: (!) 112   Resp: 18   Temp: 97.4 °F (36.3 °C)   SpO2: 99%   Weight: 81.6 kg (180 lb)   Height: 5' 2\" (1.575 m)            Physical Exam  Vitals and nursing note reviewed. Constitutional:       General: She is not in acute distress. Appearance: She is well-developed. She is not diaphoretic. HENT:      Head: Normocephalic and atraumatic. Eyes:      Pupils: Pupils are equal, round, and reactive to light. Cardiovascular:      Rate and Rhythm: Normal rate and regular rhythm. Heart sounds: Normal heart sounds. No murmur heard. No friction rub. No gallop. Pulmonary:      Effort: Pulmonary effort is normal. No respiratory distress. Breath sounds: Normal breath sounds. No wheezing. Abdominal:      General: Bowel sounds are normal. There is no distension. Palpations: Abdomen is soft. Tenderness: There is abdominal tenderness. There is no guarding or rebound. Musculoskeletal:      Cervical back: Normal range of motion and neck supple. Skin:     General: Skin is warm. Findings: No rash. Neurological:      Mental Status: She is alert and oriented to person, place, and time. Motor: Weakness present. Psychiatric:         Behavior: Behavior normal.         Thought Content: Thought content normal.         Judgment: Judgment normal.          MDM     This is a 42-year-old female with past medical history, review of systems, physical exam as above, presenting with complaints of abdominal pain, constipation, urinary retention, in the setting of a history of known metastatic breast cancer, with spinal metastasis. Patient was evaluated here in January for urinary retention and lower extremity weakness, discovered to have cervical, thoracic and lumbar metastatic disease, underwent lumbar laminectomy by neurosurgical service. Patient with urinary retention thereafter, the did not resolve, requiring the addition of Ochoa catheter. Patient states approximately 7 to 10 days of constipation, with small bowel movements produced with the use of enemas.   She states urinary retention developed today despite the fact that there is a Ochoa catheter in place. She endorses generalized abdominal tenderness, mild nausea with one episode of emesis. She states she has been using MiraLAX and stool softeners, to offset the use of narcotic pain control which she has been taking since she was discharged from rehab. Patient endorses mildly decreased strength in the bilateral lower extremities. Physical exam is remarkable for a well-appearing young female, in no acute distress noted to be normotensive, afebrile, mildly tachycardic, satting well on room air. Bedside bladder scan with greater than 900 cc of urine, likely obstructed Ochoa catheter. Plan to replace Ochoa catheter, obtain CMP, CBC, UA, lipase, CT scan of the abdomen and pelvis. We will consider spinal imaging, reassess, and make a disposition. Procedures    Perfect Serve Consult for Admission  8:34 PM    ED Room Number: UX59/63  Patient Name and age:  Oswaldo Rojas 27 y.o.  female  Working Diagnosis:   1. NAHED (acute kidney injury) ([facility]    2. Hypercalcemia    3.  Urinary tract infection associated with indwelling urethral catheter, initial encounter ([facility]        COVID-19 Suspicion:  no  Sepsis present:  no  Reassessment needed: no  Code Status:  Full Code  Readmission: no  Isolation Requirements:  no  Recommended Level of Care:  telemetry  Department:[facility] Adult ED - 21   Other:  D/w Dr. Rachel Pires

## 2022-03-31 NOTE — ED NOTES
Bladder scanned pt and found >917mL retained urine. Attempted to irrigate aguilera without success. Aguilera discontinued and replaced.

## 2022-04-01 ENCOUNTER — APPOINTMENT (OUTPATIENT)
Dept: GENERAL RADIOLOGY | Age: 32
DRG: 699 | End: 2022-04-01
Attending: NURSE PRACTITIONER
Payer: COMMERCIAL

## 2022-04-01 ENCOUNTER — APPOINTMENT (OUTPATIENT)
Dept: ULTRASOUND IMAGING | Age: 32
DRG: 699 | End: 2022-04-01
Attending: STUDENT IN AN ORGANIZED HEALTH CARE EDUCATION/TRAINING PROGRAM
Payer: COMMERCIAL

## 2022-04-01 PROBLEM — E88.09 HYPOALBUMINEMIA: Status: ACTIVE | Noted: 2022-04-01

## 2022-04-01 PROBLEM — E87.6 HYPOKALEMIA: Status: ACTIVE | Noted: 2022-04-01

## 2022-04-01 LAB
ALBUMIN SERPL-MCNC: 2.2 G/DL (ref 3.5–5)
ALBUMIN/GLOB SERPL: 0.5 {RATIO} (ref 1.1–2.2)
ALP SERPL-CCNC: 290 U/L (ref 45–117)
ALT SERPL-CCNC: 38 U/L (ref 12–78)
ANION GAP SERPL CALC-SCNC: 5 MMOL/L (ref 5–15)
ANION GAP SERPL CALC-SCNC: 6 MMOL/L (ref 5–15)
AST SERPL-CCNC: 118 U/L (ref 15–37)
BACTERIA SPEC CULT: NORMAL
BILIRUB SERPL-MCNC: 0.4 MG/DL (ref 0.2–1)
BUN SERPL-MCNC: 12 MG/DL (ref 6–20)
BUN SERPL-MCNC: 18 MG/DL (ref 6–20)
BUN/CREAT SERPL: 14 (ref 12–20)
BUN/CREAT SERPL: 16 (ref 12–20)
CALCIUM SERPL-MCNC: 13.1 MG/DL (ref 8.5–10.1)
CALCIUM SERPL-MCNC: 13.3 MG/DL (ref 8.5–10.1)
CC UR VC: NORMAL
CHLORIDE SERPL-SCNC: 101 MMOL/L (ref 97–108)
CHLORIDE SERPL-SCNC: 108 MMOL/L (ref 97–108)
CO2 SERPL-SCNC: 27 MMOL/L (ref 21–32)
CO2 SERPL-SCNC: 28 MMOL/L (ref 21–32)
COMMENT, HOLDF: NORMAL
CREAT SERPL-MCNC: 0.88 MG/DL (ref 0.55–1.02)
CREAT SERPL-MCNC: 1.13 MG/DL (ref 0.55–1.02)
CREAT UR-MCNC: 53.4 MG/DL
ERYTHROCYTE [DISTWIDTH] IN BLOOD BY AUTOMATED COUNT: 20.1 % (ref 11.5–14.5)
GLOBULIN SER CALC-MCNC: 4.1 G/DL (ref 2–4)
GLUCOSE SERPL-MCNC: 89 MG/DL (ref 65–100)
GLUCOSE SERPL-MCNC: 95 MG/DL (ref 65–100)
HCT VFR BLD AUTO: 22.4 % (ref 35–47)
HGB BLD-MCNC: 7.1 G/DL (ref 11.5–16)
MAGNESIUM SERPL-MCNC: 1.6 MG/DL (ref 1.6–2.4)
MAGNESIUM SERPL-MCNC: 1.7 MG/DL (ref 1.6–2.4)
MCH RBC QN AUTO: 25 PG (ref 26–34)
MCHC RBC AUTO-ENTMCNC: 31.7 G/DL (ref 30–36.5)
MCV RBC AUTO: 78.9 FL (ref 80–99)
NRBC # BLD: 0.19 K/UL (ref 0–0.01)
NRBC BLD-RTO: 3.2 PER 100 WBC
PHOSPHATE SERPL-MCNC: 2.6 MG/DL (ref 2.6–4.7)
PHOSPHATE SERPL-MCNC: 3.6 MG/DL (ref 2.6–4.7)
PLATELET # BLD AUTO: 219 K/UL (ref 150–400)
PMV BLD AUTO: 9.6 FL (ref 8.9–12.9)
POTASSIUM SERPL-SCNC: 2.6 MMOL/L (ref 3.5–5.1)
POTASSIUM SERPL-SCNC: 3.7 MMOL/L (ref 3.5–5.1)
PROT SERPL-MCNC: 6.3 G/DL (ref 6.4–8.2)
RBC # BLD AUTO: 2.84 M/UL (ref 3.8–5.2)
SAMPLES BEING HELD,HOLD: NORMAL
SERVICE CMNT-IMP: NORMAL
SODIUM SERPL-SCNC: 135 MMOL/L (ref 136–145)
SODIUM SERPL-SCNC: 140 MMOL/L (ref 136–145)
SODIUM UR-SCNC: 49 MMOL/L
WBC # BLD AUTO: 5.9 K/UL (ref 3.6–11)

## 2022-04-01 PROCEDURE — 84100 ASSAY OF PHOSPHORUS: CPT

## 2022-04-01 PROCEDURE — 74011250637 HC RX REV CODE- 250/637: Performed by: NURSE PRACTITIONER

## 2022-04-01 PROCEDURE — 74011250637 HC RX REV CODE- 250/637: Performed by: STUDENT IN AN ORGANIZED HEALTH CARE EDUCATION/TRAINING PROGRAM

## 2022-04-01 PROCEDURE — 77030037877 HC DRSG MEPILEX >48IN BORD MOLN -A

## 2022-04-01 PROCEDURE — 36593 DECLOT VASCULAR DEVICE: CPT

## 2022-04-01 PROCEDURE — 77030003560 HC NDL HUBR BARD -A

## 2022-04-01 PROCEDURE — 83735 ASSAY OF MAGNESIUM: CPT

## 2022-04-01 PROCEDURE — 93005 ELECTROCARDIOGRAM TRACING: CPT

## 2022-04-01 PROCEDURE — 97161 PT EVAL LOW COMPLEX 20 MIN: CPT

## 2022-04-01 PROCEDURE — 80053 COMPREHEN METABOLIC PANEL: CPT

## 2022-04-01 PROCEDURE — 82397 CHEMILUMINESCENT ASSAY: CPT

## 2022-04-01 PROCEDURE — 85027 COMPLETE CBC AUTOMATED: CPT

## 2022-04-01 PROCEDURE — 36591 DRAW BLOOD OFF VENOUS DEVICE: CPT

## 2022-04-01 PROCEDURE — P9047 ALBUMIN (HUMAN), 25%, 50ML: HCPCS | Performed by: STUDENT IN AN ORGANIZED HEALTH CARE EDUCATION/TRAINING PROGRAM

## 2022-04-01 PROCEDURE — 74011000250 HC RX REV CODE- 250: Performed by: NURSE PRACTITIONER

## 2022-04-01 PROCEDURE — 83970 ASSAY OF PARATHORMONE: CPT

## 2022-04-01 PROCEDURE — 74011250636 HC RX REV CODE- 250/636: Performed by: FAMILY MEDICINE

## 2022-04-01 PROCEDURE — C9113 INJ PANTOPRAZOLE SODIUM, VIA: HCPCS | Performed by: NURSE PRACTITIONER

## 2022-04-01 PROCEDURE — 97165 OT EVAL LOW COMPLEX 30 MIN: CPT

## 2022-04-01 PROCEDURE — 97530 THERAPEUTIC ACTIVITIES: CPT

## 2022-04-01 PROCEDURE — 82652 VIT D 1 25-DIHYDROXY: CPT

## 2022-04-01 PROCEDURE — 74011000250 HC RX REV CODE- 250: Performed by: FAMILY MEDICINE

## 2022-04-01 PROCEDURE — 74011250636 HC RX REV CODE- 250/636: Performed by: STUDENT IN AN ORGANIZED HEALTH CARE EDUCATION/TRAINING PROGRAM

## 2022-04-01 PROCEDURE — 82570 ASSAY OF URINE CREATININE: CPT

## 2022-04-01 PROCEDURE — 97535 SELF CARE MNGMENT TRAINING: CPT

## 2022-04-01 PROCEDURE — C9113 INJ PANTOPRAZOLE SODIUM, VIA: HCPCS | Performed by: STUDENT IN AN ORGANIZED HEALTH CARE EDUCATION/TRAINING PROGRAM

## 2022-04-01 PROCEDURE — 82306 VITAMIN D 25 HYDROXY: CPT

## 2022-04-01 PROCEDURE — 65660000000 HC RM CCU STEPDOWN

## 2022-04-01 PROCEDURE — 84300 ASSAY OF URINE SODIUM: CPT

## 2022-04-01 PROCEDURE — 77030041071 HC DRSG COLLGN MDII -A

## 2022-04-01 PROCEDURE — 74011250636 HC RX REV CODE- 250/636: Performed by: NURSE PRACTITIONER

## 2022-04-01 PROCEDURE — 71045 X-RAY EXAM CHEST 1 VIEW: CPT

## 2022-04-01 PROCEDURE — 74011000250 HC RX REV CODE- 250: Performed by: STUDENT IN AN ORGANIZED HEALTH CARE EDUCATION/TRAINING PROGRAM

## 2022-04-01 RX ORDER — BACLOFEN 10 MG/1
10 TABLET ORAL
Status: DISCONTINUED | OUTPATIENT
Start: 2022-04-01 | End: 2022-04-05 | Stop reason: HOSPADM

## 2022-04-01 RX ORDER — FACIAL-BODY WIPES
10 EACH TOPICAL DAILY PRN
Status: DISCONTINUED | OUTPATIENT
Start: 2022-04-01 | End: 2022-04-05 | Stop reason: HOSPADM

## 2022-04-01 RX ORDER — OXYCODONE AND ACETAMINOPHEN 5; 325 MG/1; MG/1
1 TABLET ORAL
COMMUNITY
Start: 2022-01-07 | End: 2022-04-05

## 2022-04-01 RX ORDER — PANTOPRAZOLE SODIUM 40 MG/10ML
40 INJECTION, POWDER, LYOPHILIZED, FOR SOLUTION INTRAVENOUS DAILY
Status: DISCONTINUED | OUTPATIENT
Start: 2022-04-01 | End: 2022-04-01

## 2022-04-01 RX ORDER — SODIUM CHLORIDE 0.9 % (FLUSH) 0.9 %
10 SYRINGE (ML) INJECTION DAILY
Status: DISCONTINUED | OUTPATIENT
Start: 2022-04-01 | End: 2022-04-01

## 2022-04-01 RX ORDER — GABAPENTIN 100 MG/1
100 CAPSULE ORAL 3 TIMES DAILY
Status: DISCONTINUED | OUTPATIENT
Start: 2022-04-01 | End: 2022-04-05 | Stop reason: HOSPADM

## 2022-04-01 RX ORDER — PANTOPRAZOLE SODIUM 40 MG/10ML
40 INJECTION, POWDER, LYOPHILIZED, FOR SOLUTION INTRAVENOUS EVERY 12 HOURS
Status: DISCONTINUED | OUTPATIENT
Start: 2022-04-01 | End: 2022-04-05 | Stop reason: HOSPADM

## 2022-04-01 RX ORDER — OXYCODONE HYDROCHLORIDE 10 MG/1
TABLET ORAL
Status: ON HOLD | COMMUNITY
Start: 2022-03-01 | End: 2022-04-05 | Stop reason: SDUPTHER

## 2022-04-01 RX ORDER — OXYCODONE HYDROCHLORIDE 5 MG/1
10 TABLET ORAL
Status: DISCONTINUED | OUTPATIENT
Start: 2022-04-01 | End: 2022-04-05 | Stop reason: HOSPADM

## 2022-04-01 RX ORDER — GABAPENTIN 100 MG/1
CAPSULE ORAL
COMMUNITY
Start: 2022-03-29

## 2022-04-01 RX ORDER — POLYETHYLENE GLYCOL 3350 17 G/17G
17 POWDER, FOR SOLUTION ORAL 3 TIMES DAILY
Status: DISCONTINUED | OUTPATIENT
Start: 2022-04-01 | End: 2022-04-01

## 2022-04-01 RX ORDER — FENTANYL 75 UG/H
PATCH TRANSDERMAL
COMMUNITY
Start: 2022-03-06

## 2022-04-01 RX ORDER — SODIUM CHLORIDE 9 MG/ML
125 INJECTION, SOLUTION INTRAVENOUS CONTINUOUS
Status: DISCONTINUED | OUTPATIENT
Start: 2022-04-01 | End: 2022-04-01

## 2022-04-01 RX ORDER — FENTANYL 75 UG/H
1 PATCH TRANSDERMAL
Status: DISCONTINUED | OUTPATIENT
Start: 2022-04-01 | End: 2022-04-05 | Stop reason: HOSPADM

## 2022-04-01 RX ORDER — ACETAMINOPHEN 325 MG/1
650 TABLET ORAL
Status: DISCONTINUED | OUTPATIENT
Start: 2022-04-01 | End: 2022-04-05 | Stop reason: HOSPADM

## 2022-04-01 RX ORDER — BALSAM PERU/CASTOR OIL
OINTMENT (GRAM) TOPICAL 2 TIMES DAILY
Status: DISCONTINUED | OUTPATIENT
Start: 2022-04-01 | End: 2022-04-05 | Stop reason: HOSPADM

## 2022-04-01 RX ORDER — POLYETHYLENE GLYCOL 3350 17 G/17G
17 POWDER, FOR SOLUTION ORAL 2 TIMES DAILY
Status: DISCONTINUED | OUTPATIENT
Start: 2022-04-02 | End: 2022-04-04

## 2022-04-01 RX ORDER — DOCUSATE SODIUM 100 MG/1
100 CAPSULE, LIQUID FILLED ORAL 2 TIMES DAILY
Status: DISCONTINUED | OUTPATIENT
Start: 2022-04-01 | End: 2022-04-01

## 2022-04-01 RX ORDER — ADHESIVE BANDAGE
30 BANDAGE TOPICAL DAILY PRN
Status: DISCONTINUED | OUTPATIENT
Start: 2022-04-01 | End: 2022-04-05 | Stop reason: HOSPADM

## 2022-04-01 RX ORDER — METOPROLOL TARTRATE 50 MG/1
50 TABLET ORAL EVERY 12 HOURS
Status: DISCONTINUED | OUTPATIENT
Start: 2022-04-01 | End: 2022-04-05 | Stop reason: HOSPADM

## 2022-04-01 RX ORDER — DULOXETIN HYDROCHLORIDE 60 MG/1
60 CAPSULE, DELAYED RELEASE ORAL DAILY
Status: DISCONTINUED | OUTPATIENT
Start: 2022-04-01 | End: 2022-04-05 | Stop reason: HOSPADM

## 2022-04-01 RX ORDER — HYDROMORPHONE HYDROCHLORIDE 2 MG/1
TABLET ORAL
Status: ON HOLD | COMMUNITY
Start: 2022-01-17 | End: 2022-04-05 | Stop reason: SDUPTHER

## 2022-04-01 RX ORDER — AMOXICILLIN 250 MG
2 CAPSULE ORAL 2 TIMES DAILY
Status: DISCONTINUED | OUTPATIENT
Start: 2022-04-01 | End: 2022-04-01

## 2022-04-01 RX ORDER — SUCRALFATE 1 G/1
1 TABLET ORAL 4 TIMES DAILY
Status: DISCONTINUED | OUTPATIENT
Start: 2022-04-01 | End: 2022-04-05 | Stop reason: HOSPADM

## 2022-04-01 RX ORDER — ALBUMIN HUMAN 250 G/1000ML
25 SOLUTION INTRAVENOUS EVERY 6 HOURS
Status: DISPENSED | OUTPATIENT
Start: 2022-04-01 | End: 2022-04-02

## 2022-04-01 RX ORDER — SODIUM CHLORIDE 0.9 % (FLUSH) 0.9 %
10 SYRINGE (ML) INJECTION EVERY 12 HOURS
Status: DISCONTINUED | OUTPATIENT
Start: 2022-04-01 | End: 2022-04-05 | Stop reason: HOSPADM

## 2022-04-01 RX ORDER — HYDROMORPHONE HYDROCHLORIDE 1 MG/ML
0.5 INJECTION, SOLUTION INTRAMUSCULAR; INTRAVENOUS; SUBCUTANEOUS
Status: DISCONTINUED | OUTPATIENT
Start: 2022-04-01 | End: 2022-04-05

## 2022-04-01 RX ORDER — HEPARIN 100 UNIT/ML
500 SYRINGE INTRAVENOUS AS NEEDED
Status: DISCONTINUED | OUTPATIENT
Start: 2022-04-01 | End: 2022-04-05 | Stop reason: HOSPADM

## 2022-04-01 RX ORDER — NALOXONE HYDROCHLORIDE 0.4 MG/ML
0.4 INJECTION, SOLUTION INTRAMUSCULAR; INTRAVENOUS; SUBCUTANEOUS
Status: DISCONTINUED | OUTPATIENT
Start: 2022-04-01 | End: 2022-04-05 | Stop reason: HOSPADM

## 2022-04-01 RX ORDER — BACITRACIN 500 UNIT/G
1 PACKET (EA) TOPICAL AS NEEDED
Status: DISCONTINUED | OUTPATIENT
Start: 2022-04-01 | End: 2022-04-05 | Stop reason: HOSPADM

## 2022-04-01 RX ORDER — IBUPROFEN 600 MG/1
TABLET ORAL
COMMUNITY
Start: 2022-03-29 | End: 2022-04-05

## 2022-04-01 RX ORDER — DULOXETIN HYDROCHLORIDE 60 MG/1
CAPSULE, DELAYED RELEASE ORAL
COMMUNITY
Start: 2022-03-01

## 2022-04-01 RX ADMIN — POTASSIUM CHLORIDE: 149 INJECTION, SOLUTION, CONCENTRATE INTRAVENOUS at 12:21

## 2022-04-01 RX ADMIN — SODIUM CHLORIDE, PRESERVATIVE FREE 10 ML: 5 INJECTION INTRAVENOUS at 09:04

## 2022-04-01 RX ADMIN — METOPROLOL TARTRATE 50 MG: 50 TABLET, FILM COATED ORAL at 21:27

## 2022-04-01 RX ADMIN — POTASSIUM CHLORIDE: 149 INJECTION, SOLUTION, CONCENTRATE INTRAVENOUS at 01:52

## 2022-04-01 RX ADMIN — Medication: at 18:05

## 2022-04-01 RX ADMIN — NALOXEGOL OXALATE 25 MG: 25 TABLET, FILM COATED ORAL at 18:03

## 2022-04-01 RX ADMIN — ALBUMIN (HUMAN) 25 G: 0.25 INJECTION, SOLUTION INTRAVENOUS at 12:31

## 2022-04-01 RX ADMIN — ALBUMIN (HUMAN) 25 G: 0.25 INJECTION, SOLUTION INTRAVENOUS at 18:31

## 2022-04-01 RX ADMIN — GABAPENTIN 100 MG: 100 CAPSULE ORAL at 21:27

## 2022-04-01 RX ADMIN — SUCRALFATE 1 G: 1 TABLET ORAL at 09:04

## 2022-04-01 RX ADMIN — BISACODYL 10 MG: 10 SUPPOSITORY RECTAL at 15:22

## 2022-04-01 RX ADMIN — SUCRALFATE 1 G: 1 TABLET ORAL at 18:03

## 2022-04-01 RX ADMIN — DOCUSATE SODIUM 100 MG: 100 CAPSULE, LIQUID FILLED ORAL at 09:03

## 2022-04-01 RX ADMIN — METOPROLOL TARTRATE 50 MG: 50 TABLET, FILM COATED ORAL at 03:41

## 2022-04-01 RX ADMIN — ALTEPLASE 1 MG: 2.2 INJECTION, POWDER, LYOPHILIZED, FOR SOLUTION INTRAVENOUS at 18:01

## 2022-04-01 RX ADMIN — GABAPENTIN 100 MG: 100 CAPSULE ORAL at 09:03

## 2022-04-01 RX ADMIN — SUCRALFATE 1 G: 1 TABLET ORAL at 12:21

## 2022-04-01 RX ADMIN — DULOXETINE HYDROCHLORIDE 60 MG: 60 CAPSULE, DELAYED RELEASE ORAL at 09:03

## 2022-04-01 RX ADMIN — HYDROMORPHONE HYDROCHLORIDE 0.5 MG: 1 INJECTION, SOLUTION INTRAMUSCULAR; INTRAVENOUS; SUBCUTANEOUS at 01:34

## 2022-04-01 RX ADMIN — PANTOPRAZOLE SODIUM 40 MG: 40 INJECTION, POWDER, FOR SOLUTION INTRAVENOUS at 09:03

## 2022-04-01 RX ADMIN — POLYETHYLENE GLYCOL 3350 17 G: 17 POWDER, FOR SOLUTION ORAL at 09:04

## 2022-04-01 RX ADMIN — ALBUMIN (HUMAN) 25 G: 0.25 INJECTION, SOLUTION INTRAVENOUS at 06:30

## 2022-04-01 RX ADMIN — HYDROMORPHONE HYDROCHLORIDE 0.5 MG: 1 INJECTION, SOLUTION INTRAMUSCULAR; INTRAVENOUS; SUBCUTANEOUS at 21:26

## 2022-04-01 RX ADMIN — SUCRALFATE 1 G: 1 TABLET ORAL at 21:27

## 2022-04-01 RX ADMIN — HYDROMORPHONE HYDROCHLORIDE 0.5 MG: 1 INJECTION, SOLUTION INTRAMUSCULAR; INTRAVENOUS; SUBCUTANEOUS at 12:38

## 2022-04-01 RX ADMIN — METOPROLOL TARTRATE 50 MG: 50 TABLET, FILM COATED ORAL at 09:03

## 2022-04-01 RX ADMIN — SODIUM CHLORIDE, PRESERVATIVE FREE 10 ML: 5 INJECTION INTRAVENOUS at 21:27

## 2022-04-01 RX ADMIN — SENNOSIDES AND DOCUSATE SODIUM 2 TABLET: 50; 8.6 TABLET ORAL at 09:03

## 2022-04-01 RX ADMIN — GABAPENTIN 100 MG: 100 CAPSULE ORAL at 15:22

## 2022-04-01 RX ADMIN — POLYETHYLENE GLYCOL 3350 17 G: 17 POWDER, FOR SOLUTION ORAL at 15:23

## 2022-04-01 RX ADMIN — PANTOPRAZOLE SODIUM 40 MG: 40 INJECTION, POWDER, FOR SOLUTION INTRAVENOUS at 21:26

## 2022-04-01 NOTE — PROGRESS NOTES
Called and spoke with Marci regarding MEWS of 3 and discussed with her the hard stops that would make this patient inappropriate for our floor according to the new ER process. Called nursing supervisor who was going to call me back after she looked into it and during that time the patient was transferred up to the floor anyway. Per [employer] we were told to take care of the patient.

## 2022-04-01 NOTE — PROGRESS NOTES
PHYSICAL THERAPY EVALUATION/DISCHARGE  Patient: Zoila Rowland (37 y.o. female)  Date: 4/1/2022  Primary Diagnosis: NAHED (acute kidney injury) ([facility] [N17.9]       Precautions:   Fall,Skin      ASSESSMENT  Based on the objective data described below, the patient presents with baseline mobility requiring significant assist for bed mobility and sliding board transfers to [facility]. She is having significant pain in back as well as RLE; no active movement of BLE. She has the support of her parents at home and feel appropriate for them to assist as needed here during her admission if wants to get to [facility], otherwise would recommend use of faith lift if out of bed. Patient has had decline overall since in rehab with extension of her cancer and states to received radiation . At this time feel not appropriate for acute skilled PT. Recommend resumption of home health when deemed appropriate by physicians. (patient states home health therapy recently signed off while she is to get radiation). Functional Outcome Measure: The patient scored 25/100 on the Barthel outcome measure which is indicative of dependent on others for mobility and most  ADLS. Other factors to consider for discharge:      Further skilled acute physical therapy is not indicated at this time. PLAN :  Recommendation for discharge: (in order for the patient to meet his/her long term goals)  To be determined: pending radiation    This discharge recommendation:  Has not yet been discussed the attending provider and/or case management    IF patient discharges home will need the following DME: patient owns DME required for discharge       SUBJECTIVE:   Patient stated My dad basically lifts me over.     OBJECTIVE DATA SUMMARY:   HISTORY:    Past Medical History:   Diagnosis Date    Breast cancer ([facility]     Metastatic breast cancer ([facility]    No past surgical history on file.     Prior level of function: bed/wheelchair bount  Personal factors and/or comorbidities impacting plan of care:     Home Situation  Home Environment: Private residence  Wheelchair Ramp: Yes  One/Two Story Residence: Two story  Living Alone: No  Support Systems: Parent(s),Child(ninoska) (9year old son)  Patient Expects to be Discharged to[de-identified] Home  Current DME Used/Available at Home: Commode, bedside,Hospital bed,Tub transfer bench,Wheelchair  Tub or Shower Type: Other (comment) (bed bathing at this time)    EXAMINATION/PRESENTATION/DECISION MAKING:   Critical Behavior:  Neurologic State: Alert  Orientation Level: Oriented X4  Cognition: Appropriate for age attention/concentration,Follows commands  Safety/Judgement: Awareness of environment,Fall prevention,Insight into deficits  Hearing: Auditory  Auditory Impairment: None    Range Of Motion:  AROM:  (no active LE's)            Strength:    Strength:  (no active LE's)                    Tone & Sensation:   Tone: Normal              Sensation: Intact               Coordination:  Coordination: Within functional limits  Vision:   Tracking: Able to track stimulus in all quadrants w/o difficulty  Diplopia: No  Acuity: Within Defined Limits  Functional Mobility:  Bed Mobility:  Rolling: Minimum assistance  Supine to Sit: Moderate assistance  Sit to Supine: Moderate assistance;Assist x2  Scooting: Maximum assistance  Transfers:         Sliding Board : Assist x2;Maximum assistance     Balance:   Sitting: Impaired  Sitting - Static: Good (unsupported)  Sitting - Dynamic: Good (unsupported); Fair (occasional)            Functional Measure:  Barthel Index:    Bathin  Bladder: 0  Bowels: 0  Groomin  Dressin  Feeding: 10  Mobility: 0  Stairs: 0  Toilet Use: 0  Transfer (Bed to Chair and Back): 5  Total: 25/100       The Barthel ADL Index: Guidelines  1. The index should be used as a record of what a patient does, not as a record of what a patient could do.   2. The main aim is to establish degree of independence from any help, physical or verbal, however minor and for whatever reason. 3. The need for supervision renders the patient not independent. 4. A patient's performance should be established using the best available evidence. Asking the patient, friends/relatives and nurses are the usual sources, but direct observation and common sense are also important. However direct testing is not needed. 5. Usually the patient's performance over the preceding 24-48 hours is important, but occasionally longer periods will be relevant. 6. Middle categories imply that the patient supplies over 50 per cent of the effort. 7. Use of aids to be independent is allowed. Score Interpretation (from [facility])    Independent   60-79 Minimally independent   40-59 Partially dependent   20-39 Very dependent   <20 Totally dependent     -Travis Caruso., Barthel, DAlbaniaW. (1965). Functional evaluation: the Barthel Index. [facility] ([address] (1997). The Barthel activities of daily living index: self-reporting versus actual performance in the old (> or = 75 years). Journal [address] 45(7), [facility], J.J.M.F, Ora Snowden., [place] (1999). Measuring the change in disability after inpatient rehabilitation; comparison of the responsiveness of the Barthel Index and Functional [place] Measure. Journal of Neurology, Neurosurgery, and Psychiatry, 66(4), 687-898. Jaime Farah NAlbaniaJMIAN, ELIAN Reynoso, & Mar Evans, MAlbaniaA. (2004) Assessment of post-stroke quality of life in cost-effectiveness studies: The usefulness of the Barthel Index and the EuroQoL-5D.  Quality of Life Research, 15, 455-05            Physical Therapy Evaluation Charge Determination   History Examination Presentation Decision-Making   MEDIUM  Complexity : 1-2 comorbidities / personal factors will impact the outcome/ POC  LOW Complexity : 1-2 Standardized tests and measures addressing body structure, function, activity limitation and / or participation in recreation  LOW Complexity : Stable, uncomplicated        Based on the above components, the patient evaluation is determined to be of the following complexity level: LOW   Activity Tolerance:   Fair      After treatment patient left in no apparent distress:   Supine in bed, Heels elevated for pressure relief, Call bell within reach and Side rails x 3    COMMUNICATION/EDUCATION:   The patients plan of care was discussed with: Occupational therapist and Registered nurse.          Thank you for this referral.  Edu Luque, PT   Time Calculation: 57 mins

## 2022-04-01 NOTE — PROGRESS NOTES
[address] [facility] Adult  Hospitalist Group                                                                                          Hospitalist Progress Note  Bernadette Amezquita [place]  Answering service: [phone] OR [phone] from in house phone        Date of Service:  2022  NAME:  Celine Thompson  :  1990  MRN:  [id number]      Admission Summary:   From ED H&P \"27year-old female with past medical history of metastatic breast cancer with cord compression. , presenting with complaints of abdominal pain, constipation, urinary retention, in the setting of a history of known metastatic breast cancer, with spinal metastasis. Patient was evaluated here in January for urinary retention and lower extremity weakness, discovered to have cervical, thoracic and lumbar metastatic disease, underwent lumbar laminectomy by neurosurgical service. Patient with urinary retention thereafter, the did not resolve, requiring the addition of Ochoa catheter. Patient states approximately 7 to 10 days of constipation, with small bowel movements produced with the use of enemas. She states urinary retention developed today despite the fact that there is a Ochoa catheter in place. She endorses generalized abdominal tenderness, mild nausea with one episode of emesis. She states she has been using MiraLAX and stool softeners, to offset the use of narcotic pain control which she has been taking since she was discharged from rehab. Patient endorses mildly decreased strength in the bilateral lower extremities. Physical exam is remarkable for a well-appearing young female, in no acute distress noted to be normotensive, afebrile, mildly tachycardic, satting well on room air. Bedside bladder scan with greater than 900 cc of urine, likely obstructed Ochoa catheter. Plan to replace Ochoa catheter, obtain CMP, CBC, UA, lipase, CT scan of the abdomen and pelvis.   We will consider spinal imaging, reassess, and make a disposition. \"    Interval history / Subjective:   Reviewed, patient examined at bedside. No obvious acute distress on examination. Vital signs stable at time of assessment. Patient reports abdominal discomfort improved since Ochoa cath was replaced. Patient continues to endorse constipation. Notified by bedside RN patient had the stool earlier in the day. Assessment & Plan:     Abdominal pain  Constipation  -GI consulted, Movantik started, increase MiraLAX to twice daily  -Seems to be chronic issue likely related to opioid pain regimen. NAHED  Hypercalcemia  Hypokalemia  -Continue IVF  -Trend BMP, mag, Phos  Melena   -Occult stool pending  -GI consulted  -Protonix BID  -Trend CBC, transfuse for hgb less than 7  History of metastatic breast cancer with cord compression  -Continue morphine, continue oxycodone prn  -Continue supportive care    Code status: Full  DVT prophylaxis: SCDs    Care Plan discussed with: Patient/Family  Anticipated Disposition: Home w/Family  Anticipated Discharge: 24 hours to 48 hours     Hospital Problems  Date Reviewed: 4/1/2022          Codes Class Noted POA    Hypoalbuminemia ICD-10-CM: E88.09  ICD-9-CM: 273.8  4/1/2022 Unknown        Hypokalemia ICD-10-CM: E87.6  ICD-9-CM: 276.8  4/1/2022 Unknown        NAHED (acute kidney injury) ([facility] ICD-10-CM: N17.9  ICD-9-CM: 584.9  3/31/2022 Unknown        Cord compression syndrome ([facility] ICD-10-CM: G95.20  ICD-9-CM: 336.9  1/19/2022 Yes                Review of Systems:   Pertinent items are noted in HPI. Vital Signs:    Last 24hrs VS reviewed since prior progress note.  Most recent are:  Visit Vitals  /82 (BP 1 Location: Left upper arm, BP Patient Position: Lying)   Pulse 80   Temp 97.6 °F (36.4 °C)   Resp 18   Ht 5' 2\" (1.575 m)   Wt 81.6 kg (180 lb)   SpO2 100%   BMI 32.92 kg/m²         Intake/Output Summary (Last 24 hours) at 4/1/2022 1720  Last data filed at 4/1/2022 1335  Gross per 24 hour   Intake 2370 ml   Output 1100 ml Net 1270 ml        Physical Examination:     I had a face to face encounter with this patient and independently examined them on 4/1/2022 as outlined below:          Constitutional:  No acute distress, cooperative, pleasant    ENT:  Oral mucosa moist, oropharynx benign. Resp:  CTA bilaterally. No wheezing/rhonchi/rales. No accessory muscle use   CV:  Regular rhythm, normal rate, no murmurs    GI:  Soft, distended, non tender. normoactive bowel sounds,     Musculoskeletal:  No edema, warm, 2+ pulses throughout    Neurologic:  Moves all extremities. AAOx3            Data Review:    Review and/or order of clinical lab test  Review and/or order of tests in the radiology section of CPT  Review and/or order of tests in the medicine section of CPT      Labs:     Recent Labs     04/01/22 0122 03/31/22  1655   WBC 5.9 7.2   HGB 7.1* 7.9*   HCT 22.4* 25.9*    247     Recent Labs     04/01/22  0132 04/01/22 0122 03/31/22  1655   NA  --  135* 135*   K  --  2.6* 3.0*   CL  --  101 97   CO2  --  28 29   BUN  --  18 22*   CREA  --  1.13* 1.40*   GLU  --  89 116*   CA  --  13.3* 14.4*   MG 1.6  --   --      Recent Labs     04/01/22  0122 03/31/22  1655   ALT 38 48   * 324*   TBILI 0.4 0.4   TP 6.3* 7.2   ALB 2.2* 2.6*   GLOB 4.1* 4.6*   LPSE  --  38*     No results for input(s): INR, PTP, APTT, INREXT in the last 72 hours. No results for input(s): FE, TIBC, PSAT, FERR in the last 72 hours. No results found for: FOL, RBCF   No results for input(s): PH, PCO2, PO2 in the last 72 hours. No results for input(s): CPK, CKNDX, TROIQ in the last 72 hours.     No lab exists for component: CPKMB  No results found for: CHOL, CHOLX, CHLST, CHOLV, HDL, HDLP, LDL, LDLC, DLDLP, TGLX, TRIGL, TRIGP, CHHD, CHHDX  No results found for: [facility]  Lab Results   Component Value Date/Time    Color STRAW 03/31/2022 04:55 PM    Appearance CLEAR 03/31/2022 04:55 PM    Specific gravity 1.015 03/31/2022 04:55 PM    pH (UA) 5.5 03/31/2022 04:55 PM    Protein 30 (A) 03/31/2022 04:55 PM    Glucose Negative 03/31/2022 04:55 PM    Ketone Negative 03/31/2022 04:55 PM    Bilirubin Negative 03/31/2022 04:55 PM    Urobilinogen 0.2 03/31/2022 04:55 PM    Nitrites Negative 03/31/2022 04:55 PM    Leukocyte Esterase LARGE (A) 03/31/2022 04:55 PM    Epithelial cells FEW 03/31/2022 04:55 PM    Bacteria 3+ (A) 03/31/2022 04:55 PM    WBC >100 (H) 03/31/2022 04:55 PM    RBC 5-10 03/31/2022 04:55 PM         Medications Reviewed:     Current Facility-Administered Medications   Medication Dose Route Frequency    sucralfate (CARAFATE) tablet 1 g  1 g Oral QID    HYDROmorphone (DILAUDID) injection 0.5 mg  0.5 mg IntraVENous Q6H PRN    magnesium hydroxide (MILK OF MAGNESIA) 400 mg/5 mL oral suspension 30 mL  30 mL Oral DAILY PRN    0.9% sodium chloride 1,000 mL with potassium chloride 60 mEq infusion   IntraVENous CONTINUOUS    albumin human 25% (BUMINATE) solution 25 g  25 g IntraVENous Q6H    metoprolol tartrate (LOPRESSOR) tablet 50 mg  50 mg Oral Q12H    baclofen (LIORESAL) tablet 10 mg  10 mg Oral QID PRN    acetaminophen (TYLENOL) tablet 650 mg  650 mg Oral Q6H PRN    DULoxetine (CYMBALTA) capsule 60 mg  60 mg Oral DAILY    fentaNYL (DURAGESIC) 75 mcg/hr patch 1 Patch  1 Patch TransDERmal Q72H    gabapentin (NEURONTIN) capsule 100 mg  100 mg Oral TID    oxyCODONE IR (ROXICODONE) tablet 10 mg  10 mg Oral Q6H PRN    naloxone (NARCAN) injection 0.4 mg  0.4 mg IntraVENous EVERY 2 MINUTES AS NEEDED    balsam peru-castor oiL (VENELEX) ointment   Topical BID    pantoprazole (PROTONIX) injection 40 mg  40 mg IntraVENous Q12H    And    sodium chloride (NS) flush 10 mL  10 mL IntraVENous Q12H    bisacodyL (DULCOLAX) suppository 10 mg  10 mg Rectal DAILY PRN    naloxegoL (MOVANTIK) tablet 25 mg  25 mg Oral DAILY    [START ON 4/2/2022] polyethylene glycol (MIRALAX) packet 17 g  17 g Oral BID ______________________________________________________________________  EXPECTED LENGTH OF STAY: 3d 2h  ACTUAL LENGTH OF STAY:          [address] [facility], [facility]

## 2022-04-01 NOTE — PROGRESS NOTES
Orders received, chart reviewed and patient evaluated by occupational therapy. Patient currently functioning at her baseline with regards to ADLs, no further acute OT needs at this time. Resume occupational therapy at least 2 days/week in the home      Recommend with nursing ADLs with supervision/setup, OOB to chair 3x/day via faith lift, toileting on bedpan with nursing staff or BSC with assist from parents. Thank you for completing as able in order to maintain patient strength, endurance and independence. Full evaluation to follow.

## 2022-04-01 NOTE — PROGRESS NOTES
OCCUPATIONAL THERAPY EVALUATION/DISCHARGE  Patient: Jany Ye (46 y.o. female)  Date: 4/1/2022  Primary Diagnosis: NAHED (acute kidney injury) ([facility] [address] [N17.9]       Precautions:  Fall,Skin    ASSESSMENT  Based on the objective data described below, the patient presents with near baseline ADL performance s/p admission for NAHED. Patient with hx of spinal sx in Jan 2022 and hx of breast cancer with mets. Patient ADLs limited by incomplete paraplegia, decreased functional activity tolerance and pain. Patient BUE ROm, strength, coordination and sensation intact. Patient lives with her parents who are her primary caregivers and has a 9year old son. Currently functioning at her baseline requiring min A-setup for upper body ADLs and max-total A for lower body ADLs and transfers using sliding board to W/C. Of note, during toileting on [facility], patient with maroon bloody stools - RN made aware and will discuss with MD. Patient has declined overall since in rehab with further metastasis of her cancer and states to receive radiation . At this time feel not appropriate for acute skilled OT. Recommend resumption of home health when deemed appropriate by physicians. (patient states home health therapy recently signed off while she is to get radiation). Current Level of Function (ADLs/self-care): IND-total for ADLs    Functional Outcome Measure: The patient scored 25/100 on the Barthel Index outcome measure which is indicative of 75% impairment in self care tasks - appears to be close to her baseline. Other factors to consider for discharge: radiation? PLAN :  Recommend with staff: Recommend with nursing, ADLs with supervision/setup, OOB to chair 3x/day via mechanical lift and toileting via beside commode with assist from parents using sliding board or bedpan with nursing staff. Thank you for completing as able in order to maintain patient strength, endurance and independence.        Recommendation for discharge: (in order for the patient to meet his/her long term goals)  Resumption of occupational therapy at least 2 days/week in the home     This discharge recommendation:  Has been made in collaboration with the attending provider and/or case management    IF patient discharges home will need the following DME: patient owns DME required for discharge       SUBJECTIVE:   Patient stated my dad just lifts me up and transfers me.     OBJECTIVE DATA SUMMARY:   HISTORY:   Past Medical History:   Diagnosis Date    Breast cancer ([facility] [address]     Metastatic breast cancer ([facility] [address]    No past surgical history on file. Prior Level of Function/Environment/Context: lives with parents and 9year old son - parents are primary caregivers; patient has all needed DME. Expanded or extensive additional review of patient history:   Home Situation  Home Environment: Private residence  Wheelchair Ramp: Yes  One/Two Story Residence: Two story  Living Alone: No  Support Systems: Parent(s)  Patient Expects to be Discharged to[de-identified] Home with home health  Current DME Used/Available at Home: Commode, bedside,Hospital bed,Tub transfer bench,Wheelchair  Tub or Shower Type: Other (comment) (bed bathing at this time)    Hand dominance: Right    EXAMINATION OF PERFORMANCE DEFICITS:  Cognitive/Behavioral Status:  Neurologic State: Alert  Orientation Level: Oriented X4  Cognition: Appropriate for age attention/concentration; Follows commands  Perception: Appears intact  Perseveration: No perseveration noted  Safety/Judgement: Awareness of environment; Fall prevention; Insight into deficits    Skin: appears intact (RN reporting pressure sore on L heel and sacrum)    Edema: none noted in BUEs    Hearing: Auditory  Auditory Impairment: None    Vision/Perceptual:    Tracking: Able to track stimulus in all quadrants w/o difficulty    Diplopia: No    Acuity: Within Defined Limits       Range of Motion:  In BUEs - WFL  AROM:  (no active LE's)     Strength:   In [address] - WFL  Strength:  (no active LE's)    Coordination:  Coordination: Within functional limits  Fine Motor Skills-Upper: Left Intact; Right Intact    Gross Motor Skills-Upper: Left Intact; Right Intact    Tone & Sensation:  In BUEs  Tone: Normal  Sensation: Intact - diminished in BLE with more pronounced decreased sensation from knees down    Balance:  Sitting: Impaired  Sitting - Static: Good (unsupported)  Sitting - Dynamic: Good (unsupported); Fair (occasional)    Functional Mobility and Transfers for ADLs:  Bed Mobility:  Rolling: Minimum assistance  Supine to Sit: Moderate assistance  Sit to Supine: Moderate assistance;Assist x2  Scooting: Maximum assistance    Transfers: Toilet Transfer : Maximum assistance;Assist x2 (to/from [facility])  Assistive Device : Gait Belt    ADL Assessment:  Feeding: Setup    Oral Facial Hygiene/Grooming: Setup    Bathing: Moderate assistance    Upper Body Dressing: Minimum assistance    Lower Body Dressing: Maximum assistance    Toileting: Maximum assistance    ADL Intervention and task modifications:  Feeding  Drink to Mouth: Independent     Toileting  Bladder Hygiene: Total assistance (dependent) (aguilera)  Bowel Hygiene: Total assistance (dependent)  Clothing Management: Total assistance (dependent)  Cues: Physical assistance for pants down;Physical assistance for pants up;Verbal cues provided    Cognitive Retraining  Safety/Judgement: Awareness of environment; Fall prevention; Insight into deficits    Functional Measure:    Barthel Index:  Bathin  Bladder: 0  Bowels: 0  Groomin  Dressin  Feeding: 10  Mobility: 0  Stairs: 0  Toilet Use: 0  Transfer (Bed to Chair and Back): 5  Total: 25/100      The Barthel ADL Index: Guidelines  1. The index should be used as a record of what a patient does, not as a record of what a patient could do. 2. The main aim is to establish degree of independence from any help, physical or verbal, however minor and for whatever reason.   3. The need for supervision renders the patient not independent. 4. A patient's performance should be established using the best available evidence. Asking the patient, friends/relatives and nurses are the usual sources, but direct observation and common sense are also important. However direct testing is not needed. 5. Usually the patient's performance over the preceding 24-48 hours is important, but occasionally longer periods will be relevant. 6. Middle categories imply that the patient supplies over 50 per cent of the effort. 7. Use of aids to be independent is allowed. Score Interpretation (from [address] [address])    Independent   60-79 Minimally independent   40-59 Partially dependent   20-39 Very dependent   <20 Totally dependent     -Travis Caruso., Barthel, DAlbaniaW. (1965). Functional evaluation: the Barthel Index. [facility] ([address] (1997). The Barthel activities of daily living index: self-reporting versus actual performance in the old (> or = 75 years). Journal of [address] [place] [address] 457), [facility], MARYANN, Alfreda Steinberg., Romeo Frankel. (1999). Measuring the change in disability after inpatient rehabilitation; comparison of the responsiveness of the Barthel Index and Functional Downs Measure. Journal of Neurology, Neurosurgery, and Psychiatry, 66(4), 898-083. Jose Vázquez, [place].PATRICK, ELIAN Reynoso, & Enrique Sampson MAlbaniaA. (2004) Assessment of post-stroke quality of life in cost-effectiveness studies: The usefulness of the Barthel Index and the EuroQoL-5D.  Quality of Life Research, 15, 029-81       Occupational Therapy Evaluation Charge Determination   History Examination Decision-Making   LOW Complexity : Brief history review  MEDIUM Complexity : 3-5 performance deficits relating to physical, cognitive , or psychosocial skils that result in activity limitations and / or participation restrictions HIGH Complexity : Patient presents with comorbidities that affect occupational performance. Signifigant modification of tasks or assistance (eg, physical or verbal) with assessment (s) is necessary to enable patient to complete evaluation       Based on the above components, the patient evaluation is determined to be of the following complexity level: MEDIUM  Pain Rating:  Reporting 4/10 pain in back and RLE at rest    Activity Tolerance:   Fair    After treatment patient left in no apparent distress:    Supine in bed, Call bell within reach, Side rails x 3 and RN aware    COMMUNICATION/EDUCATION:   The patients plan of care was discussed with: Physical therapist and Registered nurse.      Thank you for this referral.  Beatriz Engle, OT  Time Calculation: 60 mins

## 2022-04-01 NOTE — ED NOTES
Spoke with MD about pt MEWS score/HR d/t pt -120 and going to 6E remote tele. MD aware of patient sx and order set ordered by MD covers patient sx. MD states patient able to transfer to [address] remote tele. none

## 2022-04-01 NOTE — CONSULTS
[facility].  217 [facility] 140 [facility], [address]  [phone]                     GI CONSULTATION NOTE      NAME:  Mady Marte   :   1990   MRN:   [id number]     Consult Date: 2022     Chief Complaint: bloody stool, constipation     History of Present Illness:  Patient is a 27 y. o. who is seen in consultation at the request of Dr. Alexandrea Treviño for the above mentioned problems. Ms. Sonam Ortega has a past medical history significant for metastatic breast cancer with cord compression. She presented to [facility] ED with generalized abdominal pain, constipation, nausea and decreased po intake that began 1 weeks ago. Today she  states lower abdominal pain decreased once her aguilera catheter was changed. However, she continues with constipation. Bowel movements typically once every few days are typically strained, never feels fully evacuated. Last BM was yesterday and was very small and hard stool. PMH:  Past Medical History:   Diagnosis Date    Breast cancer ([facility] [address]     Metastatic breast cancer ([facility] [address]        PSH:  No past surgical history on file. Allergies:  No Known Allergies    Home Medications:  Prior to Admission Medications   Prescriptions Last Dose Informant Patient Reported? Taking? DULoxetine (CYMBALTA) 60 mg capsule   Yes No   Sig: TAKE 1 CAPSULE BY MOUTH EVERY DAY DO NOT CRUSH OR CHEW   HYDROmorphone (DILAUDID) 2 mg tablet   Yes No   Sig: TAKE 1 TO 2 TABLETS BY MOUTH EVERY 6 HOURS AS NEEDED FOR PAIN   acetaminophen (TYLENOL) 325 mg tablet   No No   Sig: Take 2 Tablets by mouth every six (6) hours as needed for Fever (headache). baclofen (LIORESAL) 10 mg tablet   No No   Sig: Take 1 Tablet by mouth four (4) times daily as needed for Muscle Spasm(s) (abd cramps). enoxaparin (LOVENOX) 40 mg/0.4 mL   No No   Si.4 mL by SubCUTAneous route every twenty-four (24) hours.  Last dose 22   fentaNYL (DURAGESIC) 75 mcg/hr   Yes No   Sig: APPLY 1 PATCH EVERY 72 HOURS gabapentin (NEURONTIN) 100 mg capsule   Yes No   ibuprofen (MOTRIN) 600 mg tablet   Yes No   metoprolol tartrate (LOPRESSOR) 50 mg tablet   No No   Sig: Take 1 Tablet by mouth every twelve (12) hours. oxyCODONE IR (ROXICODONE) 10 mg tab immediate release tablet   Yes No   Sig: TAKE 1 TABLET BY MOUTH EVERY 4 HOURS   oxyCODONE-acetaminophen (PERCOCET) 5-325 mg per tablet   Yes No   Sig: Take 1 Tablet by mouth every six (6) hours as needed. senna-docusate (PERICOLACE) 8.6-50 mg per tablet   No No   Sig: Take 2 Tablets by mouth two (2) times a day.       Facility-Administered Medications: None       Hospital Medications:  Current Facility-Administered Medications   Medication Dose Route Frequency    polyethylene glycol (MIRALAX) packet 17 g  17 g Oral TID    docusate sodium (COLACE) capsule 100 mg  100 mg Oral BID    sucralfate (CARAFATE) tablet 1 g  1 g Oral QID    HYDROmorphone (DILAUDID) injection 0.5 mg  0.5 mg IntraVENous Q6H PRN    magnesium hydroxide (MILK OF MAGNESIA) 400 mg/5 mL oral suspension 30 mL  30 mL Oral DAILY PRN    0.9% sodium chloride 1,000 mL with potassium chloride 60 mEq infusion   IntraVENous CONTINUOUS    albumin human 25% (BUMINATE) solution 25 g  25 g IntraVENous Q6H    metoprolol tartrate (LOPRESSOR) tablet 50 mg  50 mg Oral Q12H    senna-docusate (PERICOLACE) 8.6-50 mg per tablet 2 Tablet  2 Tablet Oral BID    baclofen (LIORESAL) tablet 10 mg  10 mg Oral QID PRN    acetaminophen (TYLENOL) tablet 650 mg  650 mg Oral Q6H PRN    DULoxetine (CYMBALTA) capsule 60 mg  60 mg Oral DAILY    fentaNYL (DURAGESIC) 75 mcg/hr patch 1 Patch  1 Patch TransDERmal Q72H    gabapentin (NEURONTIN) capsule 100 mg  100 mg Oral TID    oxyCODONE IR (ROXICODONE) tablet 10 mg  10 mg Oral Q6H PRN    naloxone (NARCAN) injection 0.4 mg  0.4 mg IntraVENous EVERY 2 MINUTES AS NEEDED    balsam peru-castor oiL (VENELEX) ointment   Topical BID    pantoprazole (PROTONIX) injection 40 mg  40 mg IntraVENous Q12H    And    sodium chloride (NS) flush 10 mL  10 mL IntraVENous Q12H    bisacodyL (DULCOLAX) suppository 10 mg  10 mg Rectal DAILY PRN       Social History:  Social History     Tobacco Use    Smoking status: Not on file    Smokeless tobacco: Not on file   Substance Use Topics    Alcohol use: Not on file       Family History:  No family history on file. Review of Systems:    Constitutional: negative fever, negative chills, negative weight loss  Eyes:   negative visual changes  ENT:   negative sore throat, tongue or lip swelling  Respiratory:  negative cough, negative dyspnea  Cards:  negative for chest pain, palpitations, lower extremity edema  GI:   See HPI  :  negative for frequency, dysuria  Integument:  negative for rash and pruritus  Heme:  negative for easy bruising and gum/nose bleeding  Musculoskel: negative for myalgias,  +back pain and muscle weakness  Neuro: negative for headaches, dizziness, vertigo  Psych:  negative for feelings of anxiety, depression      Objective:     Patient Vitals for the past 8 hrs:   BP Temp Pulse Resp SpO2   04/01/22 1508 122/82 97.6 °F (36.4 °C) 80 18 100 %   04/01/22 1425   96     04/01/22 1221   81     04/01/22 1011   84     04/01/22 0844 (!) 129/90 99 °F (37.2 °C) 93 18 100 %     04/01 0701 - 04/01 1900  In: 1270 [P.O.:240; I.V.:1030]  Out: 350 [Urine:350]  03/30 1901 - 04/01 0700  In: 1100 [I.V.:1100]  Out: 750 [Urine:750]      PHYSICAL EXAM:  General appearance:  Cooperative AAF, NAD  Skin: Extremities and face reveal no rashes. HEENT: Sclerae anicteric. Extra-occular muscles are intact. Cardiovascular: Regular rate and rhythm. No murmurs, gallops, or rubs. Respiratory:  Clear breath sounds with no wheezes, rales, or rhonchi. GI: Abdomen nondistended, soft, and mild lower abdominal tenderness. Normal active bowel sounds. Rectal: Deferred   Musculoskeletal: No pitting edema of the lower legs.     Neurological:  Patient is alert and oriented. Psychiatric: No anxiety or agitation. Flat affect      Data Review     Recent Labs     04/01/22  0122 03/31/22  1655   WBC 5.9 7.2   HGB 7.1* 7.9*   HCT 22.4* 25.9*    247     Recent Labs     04/01/22  0122 03/31/22  1655   * 135*   K 2.6* 3.0*    97   CO2 28 29   BUN 18 22*   CREA 1.13* 1.40*   GLU 89 116*   CA 13.3* 14.4*     Recent Labs     04/01/22  0122 03/31/22  1655   * 324*   TP 6.3* 7.2   ALB 2.2* 2.6*   GLOB 4.1* 4.6*   LPSE  --  38*     No results for input(s): INR, PTP, APTT, INREXT in the last 72 hours. Imaging studies reviewed    Assessment / Plan :     Abdominal pain   Constipation  Hypokalemia  Hypercalcemia    26 yo female with h/o metastatic breast cancer. Presents with abdominal pain and constipation. Her constipation is multifactorial, with opioids playing a key role as she is on multiple narcotics for pain control.   She also has electrolyte imbalances which can largely contribute altered GI motility; K+ 2.6 an Ca 13.3.     - Start Movantik 25mg daily   - D/C colace and Senna  - Decrease Miralax to BID and deescalate dose as bowel movements soften    - replete K+ - per hospitalist, maintain K+>4  - check Mag and Phos         Patient Active Hospital Problem List:   Active Problems:    Cord compression syndrome ([facility] [address] (1/19/2022)      NAHED (acute kidney injury) ([facility] [address] (3/31/2022)      Hypoalbuminemia (4/1/2022)      Hypokalemia (4/1/2022)

## 2022-04-01 NOTE — WOUND CARE
WOCN Note:     New consult placed for assessment of lower lumbar wound and left lateral heel. Chart reviewed. Assessed in room 421. Photography consent received from patient. Admitted DX:  NAHED   Past Medical History:   Diagnosis Date    Breast cancer ([facility] [address]     Metastatic breast cancer ([facility] [address]      Assessment:   Patient is A&O x 4, communicative and requires assist of 1 with repositioning. Bed: [place] with air module  Patient wearing briefs for incontinence. Patient reports no pain. Heels offloaded with pillows. Sacrum and buttocks intact without erythema. 1. POA Lower lumbar, healing surgical wound: 1.8 x 1.2 x 0.7 cm  80% red 20% adherent yellow; no exudate; no odor. Periwound  without erythema. 2. POA Left lateral heel, Deep Tissue Pressure Injury:  100% non blanching purple. 2.2 x 2 x 0 cm. Wound, Pressure Prevention & Skin Care Recommendations:    1. Minimize layers of linen/pads under patient to optimize support surface. 2.  Turn/reposition approximately every 2 hours and offload heels. 3.  Manage moisture/ Keep skin folds clean and dry/minimize brief usage. 4.  Specialty bed: [place] with air module  5. Lumbar back:  Every other day cleanse with saline; apply Puracol and foam dressing. 6.  Left heel:  Venelex BID    Discussed above plan with patient and Kaitlin JORDAN.     Transition of Care:   Plan to follow as needed while admitted to hospital.    LUZ ELENA PeñalozaN RN [facility] [place] [facility]  Available on [employer]  Office [phone]

## 2022-04-01 NOTE — ADT AUTH CERT NOTES
Comments  Comment        Facility Name: [address]                                 Patient Demographics    Patient Name   Isaias Ozuna Legal Sex   Female    1990 Address   [address] [address] Phone   [phone] [facility])     Hospital Account    Name Acct ID Class Status Primary Coverage   Isaias Ozuna [id number] [facility]            Guarantor Account (for Hospital Account [de-identified])    Name Relation to Pt Service Area Active? Acct Type   Isaias Ozuna Self [facility] Yes Personal/Family   Address Phone     [address] [phone](M)              Coverage Information (for Hospital Account [de-identified])    F/O Payor/Plan Subscriber  Subscriber Sex Precert #   BLUE CROSS/VA BLUE CROSS OUT OF STATE 86 F    Subscriber Subscriber #   Isaias Ozuna [id number]   Grp # Group Name   [id number]   Address Phone   [address]    Policy Number Status Effective Date Benefits Phone   [id number] -  -   Auth/Cert               Diagnosis     Codes Comments   NAHED (acute kidney injury) ([facility]  ICD-10-CM: N17.9   ICD-9-CM: 118. 9             Admission Information    Arrival Date/Time: 2022 1503 Admit Date/Time: 2022 1506 IP Adm.  Date/Time: 2022 2041   Admission Type: Emergency Point of Origin: Non-health Care Facility/self Admit Category: Home   Means of Arrival: Ambulance Primary Service: Medicine Secondary Service: N/A   Transfer Source:  Service Area: Danita Solares Unit: [address] Provider: Eriberto Dickson DO Attending Provider: Krissy Downey MD Referring Provider:      Admission Information    Attending Provider Admission Dx Admitted on   Albertina Cobian MD NAHED (acute kidney injury) [facility]) 22   Service Isolation Code Status   Medicine -- Full Code   Allergies Advance Care Planning    No Known Allergies Jump to the Activity       Admission Information    Unit/Bed: [address] ONCOLOG[facility] Service: Medicine   Admitting provider: Christina Warren DO Phone: [phone]   Attending provider: Madhavi Cassidy MD Phone: [phone]   PCP: Josselyn Wellington MD Phone: [phone]   Admission dx:  Patient class: I   Admission type: ER       Patient Demographics    Patient Name   Brandin Ferrera   [id number] Legal Sex   Female    1990 Address   [address] [address] Phone   [phone] (Home)     H&P Notes       H&P by Christina Warren DO at 22 documented on ED to Hosp-Admission (Current) from 3/31/2022 in [facility]    Author: Christina Warren DO Author Type: Physician Filed: 22 0809   Note Status: Signed Cosign: Cosign Not Required Date of Service: 22   : Christina Warren DO (Physician)               Expand AllCollapse All    PLEASE NOTE: I HAVE GENERATED THIS NOTE WITH THE ASSISTANCE OF VOICE-RECOGNITION TECHNOLOGY. PLEASE EXCUSE ANY SPELLING, GRAMMATICAL, AND SYNTAX ERRORS YOU MAY FIND. IF YOU NEED CLARIFICATION ON ANYTHING, PLEASE FEEL FREE TO REACH OUT TO ME.  THANK YOU                   [facility] Adult  Hospitalist Group  History and Physical - Dr. Kaylee Velásquez     Primary Care Provider: Josselyn Wellington MD  Date of Service:  See Date Note Was Originated     Chief Complaint: Constipation     Subjective:      27 y.o. female presents with couple days duration of abrupt onset, gradually worsening, severe, constant, constipation there is also associated with decreased urine output. Patient denies exacerbating features  and denies remitting features. Patient's mother is at bedside supplementing history, stating that she has been constipated for couple days now, but will really scared her was when she noticed decreased urine output and the patient's Ochoa bag.   Patient has had a chronic indwelling catheter since her spinal cord syndrome in January.     Review of Systems:  12 point ROS obtained and otherwise negative, except as per HPI and above.          Past Medical History:   Diagnosis Date    [facility]      Metastatic breast cancer ([facility]        No past surgical history on file. Spinal surgery  Prior to Admission medications    Medication Sig Start Date End Date Taking? Authorizing Provider   DULoxetine (CYMBALTA) 60 mg capsule TAKE 1 CAPSULE BY MOUTH EVERY DAY DO NOT CRUSH OR CHEW 3/1/22     Provider, Historical   fentaNYL (DURAGESIC) 75 mcg/hr APPLY 1 PATCH EVERY 72 HOURS 3/6/22     Provider, Historical   HYDROmorphone (DILAUDID) 2 mg tablet TAKE 1 TO 2 TABLETS BY MOUTH EVERY 6 HOURS AS NEEDED FOR PAIN 1/17/22     Provider, Historical   gabapentin (NEURONTIN) 100 mg capsule   3/29/22     Provider, Historical   ibuprofen (MOTRIN) 600 mg tablet   3/29/22     Provider, Historical   oxyCODONE IR (ROXICODONE) 10 mg tab immediate release tablet TAKE 1 TABLET BY MOUTH EVERY 4 HOURS 3/1/22     Provider, Historical   oxyCODONE-acetaminophen (PERCOCET) 5-325 mg per tablet Take 1 Tablet by mouth every six (6) hours as needed. 1/7/22     Provider, Historical   metoprolol tartrate (LOPRESSOR) 50 mg tablet Take 1 Tablet by mouth every twelve (12) hours. 2/1/22     Izabela Pena MD   senna-docusate (PERICOLACE) 8.6-50 mg per tablet Take 2 Tablets by mouth two (2) times a day. 2/1/22     Izabela Pena MD   enoxaparin (LOVENOX) 40 mg/0.4 mL 0.4 mL by SubCUTAneous route every twenty-four (24) hours. Last dose 2/2/22 2/2/22     Izabela Pena MD   baclofen (LIORESAL) 10 mg tablet Take 1 Tablet by mouth four (4) times daily as needed for Muscle Spasm(s) (abd cramps). 2/1/22     Izabela Pena MD   acetaminophen (TYLENOL) 325 mg tablet Take 2 Tablets by mouth every six (6) hours as needed for Fever (headache). 2/1/22     Izabela Pena MD      No Known Allergies   No family history on file.   No family history of cancer  Social History              Socioeconomic History    Marital status: SINGLE      .     Objective:      Physical Exam:      VS as below     Const'l:                        Normal body habitus, a&o, no acute distress  Head/Neck:                 neck supple, no jvd, trachea midline, carotid midline, no cervical/head mass  Eyes:                           olamide, nonicteric sclera, eom intact  ENT:                            auditory acuity grossly intact, no nasal deformity  Cardio:                        Tachycardic rate regular no rhythm, no murmurs/rubs/gallops, no carotid bruit, normal s1, s2  Pulm:                           no accessory muscle use, equal normal breath sounds bilaterally, clear tab  Abd:                             S NT ND normal bowel sounds x 4 quadrants, no palpable masses  Derm:                          no rashes, no ulcers, no lesions  Extr:                             No edema, no cyanosis, no calf tenderness, no varicosities  Neuro:                         cn II-XII grossly intact, muscle strength intact, sensation intact  Psych:                         mood intact, judgement intact     Data Review: All diagnostic labs and studies have been reviewed.     .     Assessment:      Active Problems:    Cord compression syndrome ([facility] (1/19/2022)       NAHED (acute kidney injury) ([facility] (3/31/2022)       Hypoalbuminemia (4/1/2022)       Hypokalemia (4/1/2022)     Constipation     Plan:      -We will fluid resuscitate the patient, avoid NSAIDs, bladder scan,  -We will give the patient MiraLAX, Colace, sucralfate  -I imagine some of this constipation is likely secondary to her opioid use, with the patient needs his medications for her metastatic cancer and recent surgery.  -We will give the patient some albumin, which will also help keep her fluid intravascularly.        Code status was discussed with the patient.   Code status entered as per the orders  Signed By: Blake Velez DO               Patient Demographics    Patient Name   Manju Carbajal   [id number] Legal Sex   Female    1990 Address   [address] [address] Phone   [phone] (Home)   CSN:   [id number]     [address] [place] [address] Date: Admit Time Room Bed   Mar 31, 2022  3:06  [[id number]] 01 [[id number]]       Attending Providers    Provider Pager From To   Nilam Amor MD  22   Keith Rodriguez DO  22   Sandy Shone, MD  22      Emergency Contact(s)    Name Relation Home Work Isiah Mother [phone]   Oleg Sen Father [phone]     Utilization Reviews         Renal Failure, Acute - Care Day 2 (2022) by Jeannie Sheridan RN       Review Entered Review Status   2022 13:45 Completed      Criteria Review      Care Day: 2 Care Date: 2022 Level of Care:    Guideline Day 2    Level Of Care    (X) Floor    2022 13:45:28 EDT by Daniel Spain      remote telemetry  99,93-81,18,129/90,100% ra    Clinical Status    ( ) * Electrolyte abnormalities absent or improved    2022 13:45:28 EDT by Daniel Spain      na 135, k  2.6 decreased  bun 18 crt  1.13, ca  13.3, GFR  55//> 60, total prot  6.3, alb 2.2, glob  4.1, a-g ratio  0.5, , alk phos  290    (X) * Acid-base abnormalities absent or improved    2022 13:45:28 EDT by Daniel Spain      improved    (X) * Hypotension absent    2022 13:45:28 EDT by Daniel Spain      129/90    Activity    (X) Activity as tolerated    2022 13:45:28 EDT by Daniel Spain      OT consult,    Routes    (X) Parenteral or oral hydration    2022 13:45:28 EDT by Daniel Spain      NS with 60 meq KCl @ 150    (X) Parenteral or oral medications    2022 13:45:28 EDT by Daniel Spain      dilaudid 0.5mg IV q6h prn - x2, protonix 40 mg IV q12h    (X) Oral diet as tolerated    2022 13:45:28 EDT by Daniel Spain      clear liquid diet    Interventions (X) Monitor electrolytes, renal function tests, acid-base, and volume status    Medications    (X) Possible medical therapies    4/1/2022 13:45:28 EDT by Sylvia Mcdowell      buminate  25g IV q6h    4/1/2022 13:45:28 EDT by Sylvia Mcdowell    Subject: Additional Clinical Information    GI consult       * Milestone   Additional Notes   AWAITING MD NOTE          WBC: 5.9   NRBC: 3.2 (H)   RBC: 2.84 (L)   HGB: 7.1 (L)   HCT: 22.4 (L)   MCV: 78.9 (L)   MCH: 25.0 (L)   MCHC: 31.7   RDW: 20.1 (H)   PLATELET: 066           Renal Failure, Acute - Care Day 1 (3/31/2022) by Catherine Bryan RN       Review Entered Review Status   4/1/2022 09:19 Completed      Criteria Review      Care Day: 1 Care Date: 3/31/2022 Level of Care:    Guideline Day 1    Level Of Care    (X) ICU or floor    4/1/2022 09:19:12 EDT by Sylvia Mcdowell      remote telemetry    97.4 temporal,  682-363 ,18,128/86, 99% ra    (X) Social Determinants of Health Assessment    (X) Readmission Risk Assessment    (X) Extended Stay Risk Assessment    Clinical Status    (X) * Clinical Indications met    (X) Possible multiple metabolic abnormalities    Activity    (X) Activity as tolerated    Routes    (X) Parenteral or oral hydration    4/1/2022 09:19:12 EDT by Sylvia Mcdowell      NS 1000ml IV once,    (X) Parenteral or oral medications    4/1/2022 09:19:12 EDT by Sylvia Mcdowell      levaquin 500 mg IV now, oxycodone IR 10 mg po now    Interventions    (X) Urinalysis and other urine tests, CBC, renal function tests, hepatitis B test, other laboratory tests    4/1/2022 09:19:12 EDT by Sylvia Mcdowell      wbc  7.2, nrbc  2.5, rbc  3.21, hgb  7.9 hct  25.9, plt 247  UA- 30 prot, large blood & leuk esterace, WBC > 100, 3+ bacteria, few amorphous crystals, present budding yeast  na 135, k  3.0  bun 22 crt  1.40, ca  14.4, GFR  43/52, alb  2.6, glob  4.6    (X) Manage metabolic abnormalities    4/1/2022 09:19:12 EDT by Sylvia Mcdowell    Subject: Additional Clinical Information    hospitalist consult, aguilera insertion, bladder checks as ordered       * Milestone   Additional Notes   A-G Ratio: 0.6 (L)   ALT: 48   AST: 137 (H)   Alk. phosphatase: 324 (H)   Lipase: 38 (L)   Beta HCG, QT: 4   urine cult pending,    CT abd/pelvis- Pulmonary nodules consistent with bony metastatic disease. 2. Multiple hepatic masses consistent with hepatic metastatic disease. 3. Diffuse osseous metastatic disease with moderate to severe vertebral   compression fracture at L1, and posterior spinal fixation at T11-L3.   4. Nonspecific small retroperitoneal lymph nodes. 5. Air-fluid levels in sigmoid colon and rectum which be correlated clinically. ED note - Abdominal Pain    This is a recurrent problem. The current episode started more than 1 week ago. The problem occurs constantly. The problem has not changed since onset. The pain is located in the generalized abdominal region. The pain is moderate. Associated symptoms include anorexia, nausea, vomiting and constipation   Physical Exam   Vitals and nursing note reviewed. Constitutional:        General: She is not in acute distress.      Appearance: She is well-developed. She is not diaphoretic. HENT:       Head: Normocephalic and atraumatic. Eyes:       Pupils: Pupils are equal, round, and reactive to light. Cardiovascular:       Rate and Rhythm: Normal rate and regular rhythm.       Heart sounds: Normal heart sounds. No murmur heard. No friction rub. No gallop.     Pulmonary:       Effort: Pulmonary effort is normal. No respiratory distress.       Breath sounds: Normal breath sounds. No wheezing. Abdominal:       General: Bowel sounds are normal. There is no distension.       Palpations: Abdomen is soft.       Tenderness: There is abdominal tenderness. There is no guarding or rebound. Musculoskeletal:       Cervical back: Normal range of motion and neck supple.     Skin:      General: Skin is warm.    Jose Grimaldo Findings: No rash. Neurological:       Mental Status: She is alert and oriented to person, place, and time.       Motor: Weakness present. Psychiatric:          Behavior: Behavior normal.          Thought Content:  Thought content normal.          Judgment: Judgment normal.       H&P - 27 y.o. female presents with couple days duration of abrupt onset, gradually worsening, severe, constant, constipation there is also associated with decreased urine output.  Patient denies exacerbating features  and denies remitting features.  Patient's mother is at bedside supplementing history, stating that she has been constipated for couple days now, but will really scared her was when she noticed decreased urine output and the patient's Ochoa bag.  Patient has had a chronic indwelling catheter since her spinal cord syndrome in January   Assessment:       Active Problems:     Cord compression syndrome ([facility] (1/19/2022)         NAHED (acute kidney injury) ([facility] (3/31/2022)         Hypoalbuminemia (4/1/2022)         Hypokalemia (4/1/2022)       Constipation       Plan:       -We will fluid resuscitate the patient, avoid NSAIDs, bladder scan,   -We will give the patient MiraLAX, Colace, sucralfate   -I imagine some of this constipation is likely secondary to her opioid use, with the patient needs his medications for her metastatic cancer and recent surgery.   -We will give the patient some albumin, which will also help keep her fluid intravascularly.           Renal Failure, Acute - Clinical Indications for Admission to Inpatient Care by Silvio Santos RN       Review Entered Review Status   4/1/2022 09:11 Completed      Criteria Review      Clinical Indications for Admission to Inpatient Care    Most Recent : Gavin Moreno Most Recent Date: 4/1/2022 09:11:07 EDT    (X) Admission is indicated for  1 or more  of the following  [A] [B] (1) (2) (3) (4) (5) (6) (7)    (8) (9) (10):       (X) Acute [B] kidney injury (stage 2) [A] requiring inpatient care, as indicated by Rosalio Share the       following :          (X) Acute [B] kidney injury, as indicated by  1 or more  of the following :             (X) 2-fold or more rise in serum creatinine from baseline             4/1/2022 09:11:07 EDT by Santiago Never               baseline  CRT 0.48 now  1.40             ( ) Reduction of more than 50% in estimated glomerular filtration rate from baseline             4/1/2022 09:11:07 EDT by Santiago Never               baseline GFR  > 60 now  43/52          (X) Acute kidney injury necessitates inpatient care, as indicated by  1 or more  of the following          :             (X) Hemodynamic instability             4/1/2022 09:11:07 EDT by Santiago Never               HR  [phone]

## 2022-04-01 NOTE — H&P
PLEASE NOTE: I HAVE GENERATED THIS NOTE WITH THE ASSISTANCE OF VOICE-RECOGNITION TECHNOLOGY. PLEASE EXCUSE ANY SPELLING, GRAMMATICAL, AND SYNTAX ERRORS YOU MAY FIND. IF YOU NEED CLARIFICATION ON ANYTHING, PLEASE FEEL FREE TO REACH OUT TO ME.  [address] [facility]note 3[facility] [facility] Adult  Hospitalist Group  History and Physical - Dr. Blake Velez    Primary Care Provider: Cornelius Curry MD  Date of Service:  See Date Note Was Originated    Chief Complaint: Constipation    Subjective:     27 y.o. female presents with couple days duration of abrupt onset, gradually worsening, severe, constant, constipation there is also associated with decreased urine output. Patient denies exacerbating features  and denies remitting features. Patient's mother is at bedside supplementing history, stating that she has been constipated for couple days now, but will really scared her was when she noticed decreased urine output and the patient's Ochoa bag. Patient has had a chronic indwelling catheter since her spinal cord syndrome in January. Review of Systems:  12 point ROS obtained and otherwise negative, except as per HPI and above. Past Medical History:   Diagnosis Date    [facility] ([facility]     Metastatic breast cancer ([facility]       No past surgical history on file. Spinal surgery  Prior to Admission medications    Medication Sig Start Date End Date Taking?  Authorizing Provider   DULoxetine (CYMBALTA) 60 mg capsule TAKE 1 CAPSULE BY MOUTH EVERY DAY DO NOT CRUSH OR CHEW 3/1/22   Provider, Historical   fentaNYL (DURAGESIC) 75 mcg/hr APPLY 1 PATCH EVERY 72 HOURS 3/6/22   Provider, Historical   HYDROmorphone (DILAUDID) 2 mg tablet TAKE 1 TO 2 TABLETS BY MOUTH EVERY 6 HOURS AS NEEDED FOR PAIN 1/17/22   Provider, Historical   gabapentin (NEURONTIN) 100 mg capsule  3/29/22   Provider, Historical   ibuprofen (MOTRIN) 600 mg tablet  3/29/22   Provider, Historical   oxyCODONE IR (ROXICODONE) 10 mg tab immediate release tablet TAKE 1 TABLET BY MOUTH EVERY 4 HOURS 3/1/22   Provider, Historical   oxyCODONE-acetaminophen (PERCOCET) 5-325 mg per tablet Take 1 Tablet by mouth every six (6) hours as needed. 1/7/22   Provider, Historical   metoprolol tartrate (LOPRESSOR) 50 mg tablet Take 1 Tablet by mouth every twelve (12) hours. 2/1/22   Rohit Metcalf MD   senna-docusate (PERICOLACE) 8.6-50 mg per tablet Take 2 Tablets by mouth two (2) times a day. 2/1/22   Rohit Metcalf MD   enoxaparin (LOVENOX) 40 mg/0.4 mL 0.4 mL by SubCUTAneous route every twenty-four (24) hours. Last dose 2/2/22 2/2/22   Rohit Metcalf MD   baclofen (LIORESAL) 10 mg tablet Take 1 Tablet by mouth four (4) times daily as needed for Muscle Spasm(s) (abd cramps). 2/1/22   Rohit Metcalf MD   acetaminophen (TYLENOL) 325 mg tablet Take 2 Tablets by mouth every six (6) hours as needed for Fever (headache). 2/1/22   Rohit Metcalf MD     No Known Allergies   No family history on file. No family history of cancer  Social History     Socioeconomic History    Marital status: SINGLE   . Objective:     Physical Exam:     VS as below    Const'l:          Normal body habitus, a&o, no acute distress  Head/Neck:       neck supple, no jvd, trachea midline, carotid midline, no cervical/head mass  Eyes:     olamide, nonicteric sclera, eom intact  ENT:      auditory acuity grossly intact, no nasal deformity  Cardio:           Tachycardic rate regular no rhythm, no murmurs/rubs/gallops, no carotid bruit, normal s1, s2  Pulm:     no accessory muscle use, equal normal breath sounds bilaterally, clear tab  Abd:       S NT ND normal bowel sounds x 4 quadrants, no palpable masses  Derm:     no rashes, no ulcers, no lesions  Extr:      No edema, no cyanosis, no calf tenderness, no varicosities  Neuro:    cn II-XII grossly intact, muscle strength intact, sensation intact  Psych:   mood intact, judgement intact    Data Review:    All diagnostic labs and studies have been reviewed. .    Assessment:     Active Problems:    Cord compression syndrome ([facility] (1/19/2022)      NAHED (acute kidney injury) ([facility] (3/31/2022)      Hypoalbuminemia (4/1/2022)      Hypokalemia (4/1/2022)    Constipation    Plan:     -We will fluid resuscitate the patient, avoid NSAIDs, bladder scan,  -We will give the patient MiraLAX, Colace, sucralfate  -I imagine some of this constipation is likely secondary to her opioid use, with the patient needs his medications for her metastatic cancer and recent surgery.  -We will give the patient some albumin, which will also help keep her fluid intravascularly. Code status was discussed with the patient.   Code status entered as per the orders  Signed By: Juan Ledbetter DO

## 2022-04-01 NOTE — ROUTINE PROCESS
Has a final transfer review been performed? Yes    Reason for Admission: NAHED  Patient comes from: ER  Mental Status: Alert and oriented  ADL:partial assistance  Ambulation:ambulates with: assistance d/t recent surgery, needs help with repositioning   Pertinent Info/Safety Concerns: Patient has aguilera since recent back surgery, HR elevated d/t low potassium and chronic pain but order set covers these sx and MD notified. Hx metastatic breast cancer  COVID Status: Not Tested  MEWS Score: 3  Vitals:    04/01/22 0001 04/01/22 0031 04/01/22 0139 04/01/22 0229   BP: (!) 144/95 (!) 142/96 (!) 141/98 (!) 151/99   Pulse:   (!) 112 (!) 116   Resp:   18 16   Temp:   98 °F (36.7 °C) 98 °F (36.7 °C)   SpO2: 95% 95% 97% 96%   Weight:       Height:         Transport mode:ED stretcher    TRANSFER - OUT REPORT:    silvia Heredia  being transferred to [address]unit) for routine progression of care       Report consisted of patient's Situation, Background, Assessment and   Recommendations(SBAR). Information from the following report(s) SBAR, ED Summary and MAR was reviewed with the receiving nurse. Lines:   Peripheral IV 03/31/22 Right Antecubital (Active)   Site Assessment Clean, dry, & intact 03/31/22 1658   Phlebitis Assessment 0 03/31/22 1658   Infiltration Assessment 0 03/31/22 1658   Dressing Status Clean, dry, & intact 03/31/22 1658   Dressing Type Transparent 03/31/22 1658   Hub Color/Line Status Patent; Flushed;Pink 03/31/22 1658        Opportunity for questions and clarification was provided.

## 2022-04-01 NOTE — PROGRESS NOTES
Care Management Interventions  PCP Verified by CM: Yes  Discharge Durable Medical Equipment: No  Physical Therapy Consult: Yes  Occupational Therapy Consult: Yes  Speech Therapy Consult: No  Support Systems: Parent(s)  Confirm Follow Up Transport: Other (see comment) (BLS)  Discharge Location  Patient Expects to be Discharged to[de-identified] Home with home health     Reason for Admission:   Abdominal pain, decreased urin output                    RUR Score:    16%              PCP: First and Last name:   Giancarlo Stiles MD     Name of Practice:  [facility]   Are you a current patient: Yes/No: yes but being followed by oncologist  (Dr. Corrina Stevens)   Approximate date of last visit: 1 yr ago   Can you participate in a virtual visit if needed: yes    Do you (patient/family) have any concerns for transition/discharge? Plan for utilizing home health:   Already open to [facility] (wound and aguilera care)    Current Advanced Directive/Advance Care Plan:  Full Code      Healthcare Decision Maker:   Click here to complete [address] including selection of the Healthcare Decision Maker Relationship (ie \"Primary\")              Transition of Care Plan:      Home with [facility] for HH/SN with [facility] ([phone]). Chart reviewed. Patient has a past medical hx of  metastatic breast Ca, mets to the spine, chronic aguilera spinal cord syndrome. CM met with patient to complete assessment. Demographics verified. The patient has the following dme: chair lift, w/c, transfer board, tub bench and bsc. The patient is known to [facility] for skilled nursing and wound care. Pharmacy is [facility]). The patient had a previous adm here 1/18/22-2/1/22 and was d/c'd to [facility]. The patient is w/c to bed confined and is requesting BLS transport at the time of D/C. CM continuing to follow.     DHIRAJ Zhang, CRM  [phone]    PCS for BLS transport has been placed on hard chart.  Transport placed on Will Call    Marietta Wheat, [address], [address] [place] [address]

## 2022-04-02 LAB
25(OH)D3 SERPL-MCNC: 23.5 NG/ML (ref 30–100)
ALBUMIN SERPL-MCNC: 3.3 G/DL (ref 3.5–5)
ALBUMIN/GLOB SERPL: 1.2 {RATIO} (ref 1.1–2.2)
ALP SERPL-CCNC: 222 U/L (ref 45–117)
ALT SERPL-CCNC: 26 U/L (ref 12–78)
ANION GAP SERPL CALC-SCNC: 5 MMOL/L (ref 5–15)
AST SERPL-CCNC: 93 U/L (ref 15–37)
BILIRUB DIRECT SERPL-MCNC: 0.3 MG/DL (ref 0–0.2)
BILIRUB SERPL-MCNC: 0.5 MG/DL (ref 0.2–1)
BUN SERPL-MCNC: 11 MG/DL (ref 6–20)
BUN/CREAT SERPL: 15 (ref 12–20)
CALCIUM SERPL-MCNC: 12.8 MG/DL (ref 8.5–10.1)
CALCIUM SERPL-MCNC: 14.2 MG/DL (ref 8.5–10.1)
CHLORIDE SERPL-SCNC: 112 MMOL/L (ref 97–108)
CO2 SERPL-SCNC: 25 MMOL/L (ref 21–32)
CREAT SERPL-MCNC: 0.75 MG/DL (ref 0.55–1.02)
ERYTHROCYTE [DISTWIDTH] IN BLOOD BY AUTOMATED COUNT: 20.6 % (ref 11.5–14.5)
GLOBULIN SER CALC-MCNC: 2.7 G/DL (ref 2–4)
GLUCOSE SERPL-MCNC: 95 MG/DL (ref 65–100)
HCT VFR BLD AUTO: 19 % (ref 35–47)
HCT VFR BLD AUTO: 23.7 % (ref 35–47)
HCT VFR BLD AUTO: 24.2 % (ref 35–47)
HEMOCCULT STL QL: POSITIVE
HGB BLD-MCNC: 5.7 G/DL (ref 11.5–16)
HGB BLD-MCNC: 7.5 G/DL (ref 11.5–16)
HGB BLD-MCNC: 7.6 G/DL (ref 11.5–16)
HISTORY CHECKED?,CKHIST: NORMAL
MAGNESIUM SERPL-MCNC: 1.5 MG/DL (ref 1.6–2.4)
MCH RBC QN AUTO: 24.7 PG (ref 26–34)
MCHC RBC AUTO-ENTMCNC: 30 G/DL (ref 30–36.5)
MCV RBC AUTO: 82.3 FL (ref 80–99)
NRBC # BLD: 0.12 K/UL (ref 0–0.01)
NRBC BLD-RTO: 2.1 PER 100 WBC
PHOSPHATE SERPL-MCNC: 2.7 MG/DL (ref 2.6–4.7)
PLATELET # BLD AUTO: 200 K/UL (ref 150–400)
PMV BLD AUTO: 9.9 FL (ref 8.9–12.9)
POTASSIUM SERPL-SCNC: 4.1 MMOL/L (ref 3.5–5.1)
PROT SERPL-MCNC: 6 G/DL (ref 6.4–8.2)
PTH-INTACT SERPL-MCNC: 15.2 PG/ML (ref 18.4–88)
RBC # BLD AUTO: 2.31 M/UL (ref 3.8–5.2)
SODIUM SERPL-SCNC: 142 MMOL/L (ref 136–145)
WBC # BLD AUTO: 5.8 K/UL (ref 3.6–11)

## 2022-04-02 PROCEDURE — 74011250636 HC RX REV CODE- 250/636: Performed by: NURSE PRACTITIONER

## 2022-04-02 PROCEDURE — 30233N1 TRANSFUSION OF NONAUTOLOGOUS RED BLOOD CELLS INTO PERIPHERAL VEIN, PERCUTANEOUS APPROACH: ICD-10-PCS | Performed by: STUDENT IN AN ORGANIZED HEALTH CARE EDUCATION/TRAINING PROGRAM

## 2022-04-02 PROCEDURE — P9040 RBC LEUKOREDUCED IRRADIATED: HCPCS

## 2022-04-02 PROCEDURE — 74011000250 HC RX REV CODE- 250: Performed by: NURSE PRACTITIONER

## 2022-04-02 PROCEDURE — 36591 DRAW BLOOD OFF VENOUS DEVICE: CPT

## 2022-04-02 PROCEDURE — 74011250637 HC RX REV CODE- 250/637: Performed by: NURSE PRACTITIONER

## 2022-04-02 PROCEDURE — P9016 RBC LEUKOCYTES REDUCED: HCPCS

## 2022-04-02 PROCEDURE — 65660000000 HC RM CCU STEPDOWN

## 2022-04-02 PROCEDURE — 74011250636 HC RX REV CODE- 250/636: Performed by: STUDENT IN AN ORGANIZED HEALTH CARE EDUCATION/TRAINING PROGRAM

## 2022-04-02 PROCEDURE — 94760 N-INVAS EAR/PLS OXIMETRY 1: CPT

## 2022-04-02 PROCEDURE — 80076 HEPATIC FUNCTION PANEL: CPT

## 2022-04-02 PROCEDURE — 86900 BLOOD TYPING SEROLOGIC ABO: CPT

## 2022-04-02 PROCEDURE — 84100 ASSAY OF PHOSPHORUS: CPT

## 2022-04-02 PROCEDURE — 85018 HEMOGLOBIN: CPT

## 2022-04-02 PROCEDURE — 82272 OCCULT BLD FECES 1-3 TESTS: CPT

## 2022-04-02 PROCEDURE — 80048 BASIC METABOLIC PNL TOTAL CA: CPT

## 2022-04-02 PROCEDURE — 86923 COMPATIBILITY TEST ELECTRIC: CPT

## 2022-04-02 PROCEDURE — 85027 COMPLETE CBC AUTOMATED: CPT

## 2022-04-02 PROCEDURE — 74011250637 HC RX REV CODE- 250/637: Performed by: STUDENT IN AN ORGANIZED HEALTH CARE EDUCATION/TRAINING PROGRAM

## 2022-04-02 PROCEDURE — 36430 TRANSFUSION BLD/BLD COMPNT: CPT

## 2022-04-02 PROCEDURE — C9113 INJ PANTOPRAZOLE SODIUM, VIA: HCPCS | Performed by: NURSE PRACTITIONER

## 2022-04-02 PROCEDURE — 83735 ASSAY OF MAGNESIUM: CPT

## 2022-04-02 RX ORDER — SODIUM CHLORIDE 0.9 % (FLUSH) 0.9 %
5-40 SYRINGE (ML) INJECTION AS NEEDED
Status: DISCONTINUED | OUTPATIENT
Start: 2022-04-02 | End: 2022-04-05 | Stop reason: HOSPADM

## 2022-04-02 RX ORDER — SODIUM CHLORIDE 0.9 % (FLUSH) 0.9 %
5-40 SYRINGE (ML) INJECTION EVERY 8 HOURS
Status: DISCONTINUED | OUTPATIENT
Start: 2022-04-02 | End: 2022-04-05 | Stop reason: HOSPADM

## 2022-04-02 RX ORDER — SODIUM CHLORIDE 9 MG/ML
250 INJECTION, SOLUTION INTRAVENOUS AS NEEDED
Status: DISCONTINUED | OUTPATIENT
Start: 2022-04-02 | End: 2022-04-05 | Stop reason: HOSPADM

## 2022-04-02 RX ADMIN — Medication: at 18:00

## 2022-04-02 RX ADMIN — METOPROLOL TARTRATE 50 MG: 50 TABLET, FILM COATED ORAL at 08:15

## 2022-04-02 RX ADMIN — POTASSIUM CHLORIDE: 149 INJECTION, SOLUTION, CONCENTRATE INTRAVENOUS at 08:39

## 2022-04-02 RX ADMIN — Medication 10 ML: at 21:37

## 2022-04-02 RX ADMIN — GABAPENTIN 100 MG: 100 CAPSULE ORAL at 16:33

## 2022-04-02 RX ADMIN — Medication 10 ML: at 18:30

## 2022-04-02 RX ADMIN — SODIUM CHLORIDE, PRESERVATIVE FREE 10 ML: 5 INJECTION INTRAVENOUS at 08:14

## 2022-04-02 RX ADMIN — METOPROLOL TARTRATE 50 MG: 50 TABLET, FILM COATED ORAL at 21:37

## 2022-04-02 RX ADMIN — GABAPENTIN 100 MG: 100 CAPSULE ORAL at 08:14

## 2022-04-02 RX ADMIN — SUCRALFATE 1 G: 1 TABLET ORAL at 08:15

## 2022-04-02 RX ADMIN — HYDROMORPHONE HYDROCHLORIDE 0.5 MG: 1 INJECTION, SOLUTION INTRAMUSCULAR; INTRAVENOUS; SUBCUTANEOUS at 15:25

## 2022-04-02 RX ADMIN — POLYETHYLENE GLYCOL 3350 17 G: 17 POWDER, FOR SOLUTION ORAL at 08:14

## 2022-04-02 RX ADMIN — Medication: at 08:14

## 2022-04-02 RX ADMIN — SODIUM CHLORIDE, PRESERVATIVE FREE 10 ML: 5 INJECTION INTRAVENOUS at 21:37

## 2022-04-02 RX ADMIN — SUCRALFATE 1 G: 1 TABLET ORAL at 17:53

## 2022-04-02 RX ADMIN — GABAPENTIN 100 MG: 100 CAPSULE ORAL at 21:37

## 2022-04-02 RX ADMIN — POLYETHYLENE GLYCOL 3350 17 G: 17 POWDER, FOR SOLUTION ORAL at 17:53

## 2022-04-02 RX ADMIN — HYDROMORPHONE HYDROCHLORIDE 0.5 MG: 1 INJECTION, SOLUTION INTRAMUSCULAR; INTRAVENOUS; SUBCUTANEOUS at 21:37

## 2022-04-02 RX ADMIN — PANTOPRAZOLE SODIUM 40 MG: 40 INJECTION, POWDER, FOR SOLUTION INTRAVENOUS at 21:37

## 2022-04-02 RX ADMIN — NALOXEGOL OXALATE 25 MG: 25 TABLET, FILM COATED ORAL at 17:53

## 2022-04-02 RX ADMIN — SUCRALFATE 1 G: 1 TABLET ORAL at 21:37

## 2022-04-02 RX ADMIN — HYDROMORPHONE HYDROCHLORIDE 0.5 MG: 1 INJECTION, SOLUTION INTRAMUSCULAR; INTRAVENOUS; SUBCUTANEOUS at 08:31

## 2022-04-02 RX ADMIN — SUCRALFATE 1 G: 1 TABLET ORAL at 12:30

## 2022-04-02 RX ADMIN — SODIUM CHLORIDE, PRESERVATIVE FREE 10 ML: 5 INJECTION INTRAVENOUS at 12:31

## 2022-04-02 RX ADMIN — PANTOPRAZOLE SODIUM 40 MG: 40 INJECTION, POWDER, FOR SOLUTION INTRAVENOUS at 08:14

## 2022-04-02 RX ADMIN — DULOXETINE HYDROCHLORIDE 60 MG: 60 CAPSULE, DELAYED RELEASE ORAL at 08:15

## 2022-04-02 RX ADMIN — POTASSIUM CHLORIDE: 149 INJECTION, SOLUTION, CONCENTRATE INTRAVENOUS at 00:45

## 2022-04-02 NOTE — PROGRESS NOTES
Gastrointestinal Progress Note    4/2/2022    Admit Date: 3/31/2022    Subjective:     Patient is on Clear Liquid. Pain: Patient complains of abdominal pain yes. The pain is located in the lower abdomen. She notes relief with her BM yesterday. Bowel Movements: BM yesterday    Bleeding:  Pt denies GI bleeding. Nurse notes BRB with BM yesterday. Pt notes larger BM yesterday which relieved her constipation partially.     Current Facility-Administered Medications   Medication Dose Route Frequency    0.9% sodium chloride infusion 250 mL  250 mL IntraVENous PRN    sucralfate (CARAFATE) tablet 1 g  1 g Oral QID    HYDROmorphone (DILAUDID) injection 0.5 mg  0.5 mg IntraVENous Q6H PRN    magnesium hydroxide (MILK OF MAGNESIA) 400 mg/5 mL oral suspension 30 mL  30 mL Oral DAILY PRN    [Held by provider] 0.9% sodium chloride 1,000 mL with potassium chloride 60 mEq infusion   IntraVENous CONTINUOUS    metoprolol tartrate (LOPRESSOR) tablet 50 mg  50 mg Oral Q12H    baclofen (LIORESAL) tablet 10 mg  10 mg Oral QID PRN    acetaminophen (TYLENOL) tablet 650 mg  650 mg Oral Q6H PRN    DULoxetine (CYMBALTA) capsule 60 mg  60 mg Oral DAILY    fentaNYL (DURAGESIC) 75 mcg/hr patch 1 Patch  1 Patch TransDERmal Q72H    gabapentin (NEURONTIN) capsule 100 mg  100 mg Oral TID    oxyCODONE IR (ROXICODONE) tablet 10 mg  10 mg Oral Q6H PRN    naloxone (NARCAN) injection 0.4 mg  0.4 mg IntraVENous EVERY 2 MINUTES AS NEEDED    balsam peru-castor oiL (VENELEX) ointment   Topical BID    pantoprazole (PROTONIX) injection 40 mg  40 mg IntraVENous Q12H    And    sodium chloride (NS) flush 10 mL  10 mL IntraVENous Q12H    bisacodyL (DULCOLAX) suppository 10 mg  10 mg Rectal DAILY PRN    [Held by provider] naloxegoL (MOVANTIK) tablet 25 mg  25 mg Oral DAILY    polyethylene glycol (MIRALAX) packet 17 g  17 g Oral BID    alteplase (CATHFLO) 1 mg in sterile water (preservative free) 1 mL injection  1 mg InterCATHeter PRN    bacitracin 500 unit/gram packet 1 Packet  1 Packet Topical PRN    heparin (porcine) pf 500 Units  500 Units InterCATHeter PRN        Objective:     Blood pressure 130/88, pulse 88, temperature 98.3 °F (36.8 °C), resp. rate 17, height 5' 2\" (1.575 m), weight 81.3 kg (179 lb 3.7 oz), SpO2 98 %. 04/02 0701 - 04/02 1900  In: 771.8 [P.O.:240;  I.V.:233]  Out: 600 [Urine:600]    03/31 1901 - 04/02 0700  In: 3680 [P.O.:360; I.V.:3320]  Out: 2392 [Urine:1650]    EXAM:  GENERAL: fatigued, no distress, HEENT: Head: Normocephalic, no lesions, without obvious abnormality., HEART: regular rate and rhythm, S1, S2 normal, no murmur, click, rub or gallop and ABDOMEN:  Bowel sounds are normal, liver is not enlarged, spleen is not enlarged    Data Review    Recent Results (from the past 24 hour(s))   MAGNESIUM    Collection Time: 04/01/22  7:14 PM   Result Value Ref Range    Magnesium 1.7 1.6 - 2.4 mg/dL   PHOSPHORUS    Collection Time: 04/01/22  7:14 PM   Result Value Ref Range    Phosphorus 2.6 2.6 - 4.7 MG/DL   METABOLIC PANEL, BASIC    Collection Time: 04/01/22  7:14 PM   Result Value Ref Range    Sodium 140 136 - 145 mmol/L    Potassium 3.7 3.5 - 5.1 mmol/L    Chloride 108 97 - 108 mmol/L    CO2 27 21 - 32 mmol/L    Anion gap 5 5 - 15 mmol/L    Glucose 95 65 - 100 mg/dL    BUN 12 6 - 20 MG/DL    Creatinine 0.88 0.55 - 1.02 MG/DL    BUN/Creatinine ratio 14 12 - 20      GFR est AA >60 >60 ml/min/1.73m2    GFR est non-AA >60 >60 ml/min/1.73m2    Calcium 13.1 (HH) 8.5 - 10.1 MG/DL   PTH INTACT    Collection Time: 04/01/22  9:36 PM   Result Value Ref Range    Calcium 14.2 (HH) 8.5 - 10.1 MG/DL    PTH, Intact 15.2 (L) 18.4 - 88.0 pg/mL   VITAMIN D, 25 HYDROXY    Collection Time: 04/01/22  9:37 PM   Result Value Ref Range    Vitamin D 25-Hydroxy 23.5 (L) 30 - 382 ng/mL   METABOLIC PANEL, BASIC    Collection Time: 04/02/22 12:46 AM   Result Value Ref Range    Sodium 142 136 - 145 mmol/L    Potassium 4.1 3.5 - 5.1 mmol/L    Chloride 112 (H) 97 - 108 mmol/L    CO2 25 21 - 32 mmol/L    Anion gap 5 5 - 15 mmol/L    Glucose 95 65 - 100 mg/dL    BUN 11 6 - 20 MG/DL    Creatinine 0.75 0.55 - 1.02 MG/DL    BUN/Creatinine ratio 15 12 - 20      GFR est AA >60 >60 ml/min/1.73m2    GFR est non-AA >60 >60 ml/min/1.73m2    Calcium 12.8 (H) 8.5 - 10.1 MG/DL   MAGNESIUM    Collection Time: 04/02/22 12:46 AM   Result Value Ref Range    Magnesium 1.5 (L) 1.6 - 2.4 mg/dL   PHOSPHORUS    Collection Time: 04/02/22 12:46 AM   Result Value Ref Range    Phosphorus 2.7 2.6 - 4.7 MG/DL   HEPATIC FUNCTION PANEL    Collection Time: 04/02/22 12:46 AM   Result Value Ref Range    Protein, total 6.0 (L) 6.4 - 8.2 g/dL    Albumin 3.3 (L) 3.5 - 5.0 g/dL    Globulin 2.7 2.0 - 4.0 g/dL    A-G Ratio 1.2 1.1 - 2.2      Bilirubin, total 0.5 0.2 - 1.0 MG/DL    Bilirubin, direct 0.3 (H) 0.0 - 0.2 MG/DL    Alk.  phosphatase 222 (H) 45 - 117 U/L    AST (SGOT) 93 (H) 15 - 37 U/L    ALT (SGPT) 26 12 - 78 U/L   CBC W/O DIFF    Collection Time: 04/02/22  2:53 AM   Result Value Ref Range    WBC 5.8 3.6 - 11.0 K/uL    RBC 2.31 (L) 3.80 - 5.20 M/uL    HGB 5.7 (LL) 11.5 - 16.0 g/dL    HCT 19.0 (L) 35.0 - 47.0 %    MCV 82.3 80.0 - 99.0 FL    MCH 24.7 (L) 26.0 - 34.0 PG    MCHC 30.0 30.0 - 36.5 g/dL    RDW 20.6 (H) 11.5 - 14.5 %    PLATELET 107 979 - 404 K/uL    MPV 9.9 8.9 - 12.9 FL    NRBC 2.1 (H) 0  WBC    ABSOLUTE NRBC 0.12 (H) 0.00 - 0.01 K/uL   TYPE & SCREEN    Collection Time: 04/02/22  5:24 AM   Result Value Ref Range    Crossmatch Expiration 04/05/2022,2359     ABO/Rh(D) O POSITIVE     Antibody screen NEG     Unit number [id number]     Blood component type RC LR,2     Unit division 00     Status of unit REL FROM [facility]     Crossmatch result Compatible     Unit number [id number]     Blood component type RC LRIR     Unit division 00     Status of unit ISSUED     Crossmatch result Compatible    RBC, ALLOCATE    Collection Time: 04/02/22  5:30 AM   Result Value Ref Range HISTORY CHECKED? Historical check performed    HGB & HCT    Collection Time: 04/02/22  2:15 PM   Result Value Ref Range    HGB 7.6 (L) 11.5 - 16.0 g/dL    HCT 24.2 (L) 35.0 - 47.0 %       Assessment:     Active Problems:    Cord compression syndrome ([facility] (1/19/2022)      NAHED (acute kidney injury) ([facility] (3/31/2022)      Hypoalbuminemia (4/1/2022)      Hypokalemia (4/1/2022)        Plan:     Opiod induced constipation-  Continue bowel regiment. Movantik started yesterday, can resume  If still with no BM can consider bowel prep Mark Oas). GI bleeding- No episodes today. Per nurse pt had BRBpR with BM yesterday  -after discussing in detail the findings with pt she would like to avoid any invasive procedures such as endoscopy    Anemia- acute on chronic. Serial H&H and transfuse Hgb to goal of 7-8. Thank you for the consult. Please call if any further questions. Dr. Debi Lowe will be taking over for [facility] GI service starting Monday.

## 2022-04-02 NOTE — CONSULTS
Hematology / Oncology ([facility])     Admit date: 3/31/2022          Subjective:     Kathie Barton is a 27 y.o. followed by my colleague, Dr Sunday Garcia at [address] [place] [address]. She has metastatic er pos, her2 neg breast cancer. Currently on lupron/AI therapy. She had cord compression and underwent T11-L3 laminectomy. She was referred for palliative radiation therapy after which Dr Sunday Garcia was going to add CDK4/6 inhibitor therapy. She was admitted with presumed opiod induced constipation but had an episode of melena and dropped her hgb to 5.7 and underwent transfusion this morning. She is somnolent from her pre-meds so most of the history is obtained from the chart. She did tell me that she has not met with rad onc yet. It looks like the appt was scheduled for 4/14/22. Disease Features  Stage IV Breast cancer, 7/2019  Grade 3 invasive ductal carcinoma. ER: 88%, GA? 2%, HER2: 2+, FISH: unamplified.   Ki67: 46%, LVI +  Post Mastectomy Pathology, 3/2020:  6 mm Residual invasive ductal carcinoma, Grade 2  1 benign axillary lymph node  ypT1b ypN0  ER: 48%, GA: 0, HER2: 3+ ( of note on initial biopsy HER2 was 2+ and FISH was unamplified)  Lumbar epidural tumor biopsy, 1/19/2022:  Metastatic carcinoma compatible with breast primary  ER 60%, GA 0% HER2: Negative, 1+  Treatment History  Left breast core needle biopsy: 7/19/19 ( Dr. Halima Alvarez)  Portacath placement: 7/22/19 ( Dr. Halima Alvarez)  AC Q3 weeks: 8/8/19 11/22/19 (6 cycles)  Left sided palliative mastectomy: 3/2/2020 (due to non healing ulcer)  Docetaxel, Herceptin, Perjeta: 5/28/20 9/24/20  TrastuzumabPertuzumab: 10/15/20 4/27/21  Tamoxifen: 8/20191/2022  T11L3 laminectomy and fusion on 1/19/2022 (Dr. Bam Rendon)    Current Treatment  Letrozole: 2/2022  Lupron: 8/15/19  Zometa: 8/2019    Treatment Goal: Palliative  Disease Status: Metastatic disease (progression)12/2021    Complete ROS is notable for    Patient Active Problem List    Diagnosis Date Noted    Hypoalbuminemia 04/01/2022    Hypokalemia 04/01/2022    NAHED (acute kidney injury) ([facility] 03/31/2022    Cord compression syndrome ([facility] 01/19/2022     Past Medical History:   Diagnosis Date    Breast cancer ([facility]     Metastatic breast cancer ([facility]       No past surgical history on file.      -Social Hx-  Social History     Tobacco Use    Smoking status: Not on file    Smokeless tobacco: Not on file   Substance Use Topics    Alcohol use: Not on file        -Family Hx-  No family history on file.     -Allergies-   No Known Allergies     -Home Medications-    -Medications-  Current Facility-Administered Medications   Medication Dose Route Frequency    0.9% sodium chloride infusion 250 mL  250 mL IntraVENous PRN    sucralfate (CARAFATE) tablet 1 g  1 g Oral QID    HYDROmorphone (DILAUDID) injection 0.5 mg  0.5 mg IntraVENous Q6H PRN    magnesium hydroxide (MILK OF MAGNESIA) 400 mg/5 mL oral suspension 30 mL  30 mL Oral DAILY PRN    [Held by provider] 0.9% sodium chloride 1,000 mL with potassium chloride 60 mEq infusion   IntraVENous CONTINUOUS    metoprolol tartrate (LOPRESSOR) tablet 50 mg  50 mg Oral Q12H    baclofen (LIORESAL) tablet 10 mg  10 mg Oral QID PRN    acetaminophen (TYLENOL) tablet 650 mg  650 mg Oral Q6H PRN    DULoxetine (CYMBALTA) capsule 60 mg  60 mg Oral DAILY    fentaNYL (DURAGESIC) 75 mcg/hr patch 1 Patch  1 Patch TransDERmal Q72H    gabapentin (NEURONTIN) capsule 100 mg  100 mg Oral TID    oxyCODONE IR (ROXICODONE) tablet 10 mg  10 mg Oral Q6H PRN    naloxone (NARCAN) injection 0.4 mg  0.4 mg IntraVENous EVERY 2 MINUTES AS NEEDED    balsam peru-castor oiL (VENELEX) ointment   Topical BID    pantoprazole (PROTONIX) injection 40 mg  40 mg IntraVENous Q12H    And    sodium chloride (NS) flush 10 mL  10 mL IntraVENous Q12H    bisacodyL (DULCOLAX) suppository 10 mg  10 mg Rectal DAILY PRN    [Held by provider] naloxegoL (MOVANTIK) tablet 25 mg  25 mg Oral DAILY    polyethylene glycol (MIRALAX) packet 17 g  17 g Oral BID    alteplase (CATHFLO) 1 mg in sterile water (preservative free) 1 mL injection  1 mg InterCATHeter PRN    bacitracin 500 unit/gram packet 1 Packet  1 Packet Topical PRN    heparin (porcine) pf 500 Units  500 Units InterCATHeter PRN        Objective:     Patient Vitals for the past 24 hrs:   Temp Pulse Resp BP SpO2   04/02/22 1053 98.4 °F (36.9 °C) 85 18 (!) 151/101 98 %   04/02/22 0854     97 %   04/02/22 0841 98.6 °F (37 °C) 87 18 (!) 133/92 98 %   04/02/22 0813 98.5 °F (36.9 °C) 88 18 (!) 151/98 98 %   04/02/22 0726 98.3 °F (36.8 °C) 93 18 (!) 142/97 97 %   04/02/22 0600  (!) 102      04/02/22 0426  97      04/02/22 0400  (!) 103      04/02/22 0251 98.3 °F (36.8 °C) (!) 111 18 (!) 138/99 98 %   04/02/22 0200  (!) 107      04/02/22 0000  (!) 104      04/01/22 2200  89      04/01/22 2102 98.5 °F (36.9 °C) 91 18 124/77 100 %   04/01/22 2000  90      04/01/22 1831  86      04/01/22 1508 97.6 °F (36.4 °C) 80 18 122/82 100 %   04/01/22 1425  96        04/02 0701 - 04/02 1900  In: 531.8 [I.V.:233]  Out: 600 [Urine:600]    -Physical Exam-    Pleasant, NAD  Oral cavity moist, PERRL  No FREDDY neck / axillae  CTAB  RRR no MRG  abd S/nt/nd, no hsm or masses  extr no edema  Skin: no petechiae or excessive bruising  CN ii-xii intact, strength intact        -Labs-    Recent Labs     04/02/22  0253 04/02/22  0046 04/01/22  2136 04/01/22  1914 04/01/22  0132 04/01/22 0122 03/31/22 1655 03/31/22 1655   WBC 5.8  --   --   --   --  5.9  --  7.2   HGB 5.7*  --   --   --   --  7.1*  --  7.9*     --   --   --   --  219  --  247   ANEU  --   --   --   --   --   --   --  4.6   NA  --  142  --  140  --  135*  --  135*   K  --  4.1  --  3.7  --  2.6*  --  3.0*   GLU  --  95  --  95  --  89  --  116*   BUN  --  11  --  12  --  18  --  22*   CREA  --  0.75  --  0.88  --  1.13*  --  1.40*   ALT  --  26  --   --   --  38  --  48   TBILI  --  0.5  --   --   --  0.4 --  0.4   AP  --  222*  --   --   --  290*  --  324*   CA  --  12.8* 14.2* 13.1*  --  13.3*   < > 14.4*   MG  --  1.5*  --  1.7 1.6  --   --   --    PHOS  --  2.7  --  2.6  --  3.6  --   --     < > = values in this interval not displayed. -Imaging-       -Assessment + Plan-       *)Anemia:  Associated with episode of melena  S/p prbc transfusion  Transfuse to keep hgb >7  Ask GI to evaluate for endoscopy    *)Metastatic Breast Cancer:  Initial tumor was er pos, her2 pos. She underwent her2 based treatment. Her progression of disease is now her2 neg. She was switch to Letrozole and GnRH therapy. PET scan shows diffuse metastatic disease including multiple bones as well as few hypermetabolic areas in the liver. Pathological fractures were noted on C6 as well as L1 vertebra. Now status post T11-L3 laminectomy and fusion on 1/19/2022 pathology showing metastatic carcinoma compatible with breast primary. ER was positive at 60% NV was negative and HER-2 was 1+. FISH testing was done and reported as unamplified. She has an appt with Rad onc 4/14/22 for consideration of palliative radiation therapy to the spine. She will had CDK4/6 inhibitor therapy added after radiation is completed. Spinal cord compression:    *)Bone Health:  zometa as outpatient therapy    *)Pain Control: On fentanyl patch 75 MCG per hour, oxycodone 10 mg every 4 hours and baclofen 10 mg 3 times daily. She is also on gabapentin 100 mg 3 times daily and Cymbalta 60 mg daily.     *)constipation:  On Naloxegol

## 2022-04-02 NOTE — PROGRESS NOTES
04/02/22 1053   Vital Signs   Temp 98.4 °F (36.9 °C)   Temp Source Oral   Pulse (Heart Rate) 85   Heart Rate Source Monitor   Resp Rate 18   O2 Sat (%) 98 %   Level of Consciousness Alert (0)   BP (!) 151/101   MAP (Calculated) 118   BP 1 Method Automatic   BP 1 Location Left upper arm   BP Patient Position At rest   MEWS Score 1   NP Andrés made aware of elevated diastolic BP, order received to HOLD continuous IVF at this time.

## 2022-04-02 NOTE — PROGRESS NOTES
I have discussed with the patient the rationale for blood component transfusion; its benefits in treating or preventing fatigue, organ damage, or death; and its risk which includes mild transfusion reactions, rare risk of blood borne infection, or more serious but rare reactions. I have discussed the alternatives to transfusion, including the risk and consequences of not receiving transfusion. The spouse had an opportunity to ask questions and had agreed to proceed with transfusion of blood components.

## 2022-04-02 NOTE — PROGRESS NOTES
[facility] Adult  Hospitalist Group                                                                                          Hospitalist Progress Note  [place], [place]  Answering service: [phone] or [phone] from in house phone        Date of Service:  2022  NAME:  Artie Perales  :  1990  MRN:  [id number]      Admission Summary:   From ED H&P \"27year-old female with past medical history of metastatic breast cancer with cord compression. , presenting with complaints of abdominal pain, constipation, urinary retention, in the setting of a history of known metastatic breast cancer, with spinal metastasis. Patient was evaluated here in January for urinary retention and lower extremity weakness, discovered to have cervical, thoracic and lumbar metastatic disease, underwent lumbar laminectomy by neurosurgical service. Patient with urinary retention thereafter, the did not resolve, requiring the addition of Ochoa catheter. Patient states approximately 7 to 10 days of constipation, with small bowel movements produced with the use of enemas. She states urinary retention developed today despite the fact that there is a Ochoa catheter in place. She endorses generalized abdominal tenderness, mild nausea with one episode of emesis. She states she has been using MiraLAX and stool softeners, to offset the use of narcotic pain control which she has been taking since she was discharged from rehab. Patient endorses mildly decreased strength in the bilateral lower extremities. Physical exam is remarkable for a well-appearing young female, in no acute distress noted to be normotensive, afebrile, mildly tachycardic, satting well on room air. Bedside bladder scan with greater than 900 cc of urine, likely obstructed Ochoa catheter. Plan to replace Ochoa catheter, obtain CMP, CBC, UA, lipase, CT scan of the abdomen and pelvis.   We will consider spinal imaging, reassess, and make a disposition. \"    Interval history / Subjective:   Patient examined at bedside. No obvious acute distress noted. No reported episodes of melena overnight. Patient reports bowel movement overnight, endorses feeling less discomfort since that time.   hemaglobin of 5.7 this a.m. unit of PRBCs ordered. Assessment & Plan:     Abdominal pain  Constipation  -GI consulted,   -Movantik started will hold if patient has additional episodes of melena no melena endorsed overnight.  -increase MiraLAX to twice daily  -Seems to be chronic issue likely related to opioid pain regimen. NAHED  Hypercalcemia  Hypokalemia  -Will hold IVF  -Potassium improved at 4.1  -Calcium trending down, continue to monitor  -Trend BMP, mag, Phos  Melena   -Occult stool pending  -GI following  -Protonix BID  -Trend CBC, transfuse for hgb less than 7  History of metastatic breast cancer with cord compression  -Continue morphine, continue oxycodone prn  -Continue supportive care  -Oncology consulted    Code status: Full  DVT prophylaxis: SCDs    Care Plan discussed with: Patient/Family  Anticipated Disposition: Home w/Family  Anticipated Discharge: 24 hours to 48 hours     Hospital Problems  Date Reviewed: 4/1/2022          Codes Class Noted POA    Hypoalbuminemia ICD-10-CM: E88.09  ICD-9-CM: 273.8  4/1/2022 Unknown        Hypokalemia ICD-10-CM: E87.6  ICD-9-CM: 276.8  4/1/2022 Unknown        NAHED (acute kidney injury) ([facility] [address] ICD-10-CM: N17.9  ICD-9-CM: 584.9  3/31/2022 Unknown        [facility] compression syndrome [facility] ICD-10-CM: G95.20  ICD-9-CM: 336.9  1/19/2022 Yes                Review of Systems:   Pertinent items are noted in HPI. Vital Signs:    Last 24hrs VS reviewed since prior progress note.  Most recent are:  Visit Vitals  BP (!) 151/101 (BP 1 Location: Left upper arm, BP Patient Position: At rest)   Pulse 85   Temp 98.4 °F (36.9 °C)   Resp 18   Ht 5' 2\" (1.575 m)   Wt 81.3 kg (179 lb 3.7 oz)   SpO2 98%   BMI 32.78 kg/m² Intake/Output Summary (Last 24 hours) at 4/2/2022 1419  Last data filed at 4/2/2022 1105  Gross per 24 hour   Intake 1841.8 ml   Output 1150 ml   Net 691.8 ml        Physical Examination:     I had a face to face encounter with this patient and independently examined them on 4/2/2022 as outlined below:          Constitutional:  No acute distress, cooperative, pleasant    ENT:  Oral mucosa moist, oropharynx benign. Resp:  CTA bilaterally. No wheezing/rhonchi/rales. No accessory muscle use   CV:  Regular rhythm, normal rate, no murmurs    GI:  Soft, distended, non tender. normoactive bowel sounds,     Musculoskeletal:  No edema, warm, 2+ pulses throughout    Neurologic:  Moves all extremities. AAOx3            Data Review:    Review and/or order of clinical lab test  Review and/or order of tests in the radiology section of CPT  Review and/or order of tests in the medicine section of CPT      Labs:     Recent Labs     04/02/22  0253 04/01/22 0122   WBC 5.8 5.9   HGB 5.7* 7.1*   HCT 19.0* 22.4*    219     Recent Labs     04/02/22  0046 04/01/22  2136 04/01/22  1914 04/01/22  0132 04/01/22  0122 04/01/22  0122     --  140  --   --  135*   K 4.1  --  3.7  --   --  2.6*   *  --  108  --   --  101   CO2 25  --  27  --   --  28   BUN 11  --  12  --   --  18   CREA 0.75  --  0.88  --   --  1.13*   GLU 95  --  95  --   --  89   CA 12.8* 14.2* 13.1*  --    < > 13.3*   MG 1.5*  --  1.7 1.6  --   --    PHOS 2.7  --  2.6  --   --  3.6    < > = values in this interval not displayed. Recent Labs     04/02/22  0046 04/01/22  0122 03/31/22  1655   ALT 26 38 48   * 290* 324*   TBILI 0.5 0.4 0.4   TP 6.0* 6.3* 7.2   ALB 3.3* 2.2* 2.6*   GLOB 2.7 4.1* 4.6*   LPSE  --   --  38*     No results for input(s): INR, PTP, APTT, INREXT, INREXT in the last 72 hours. No results for input(s): FE, TIBC, PSAT, FERR in the last 72 hours.    No results found for: FOL, RBCF   No results for input(s): PH, PCO2, PO2 in the last 72 hours. No results for input(s): CPK, CKNDX, TROIQ in the last 72 hours.     No lab exists for component: CPKMB  No results found for: CHOL, CHOLX, CHLST, CHOLV, HDL, HDLP, LDL, LDLC, DLDLP, TGLX, TRIGL, TRIGP, CHHD, CHHDX  No results found for: [facility]  Lab Results   Component Value Date/Time    Color STRAW 03/31/2022 04:55 PM    Appearance CLEAR 03/31/2022 04:55 PM    Specific gravity 1.015 03/31/2022 04:55 PM    pH (UA) 5.5 03/31/2022 04:55 PM    Protein 30 (A) 03/31/2022 04:55 PM    Glucose Negative 03/31/2022 04:55 PM    Ketone Negative 03/31/2022 04:55 PM    Bilirubin Negative 03/31/2022 04:55 PM    Urobilinogen 0.2 03/31/2022 04:55 PM    Nitrites Negative 03/31/2022 04:55 PM    Leukocyte Esterase LARGE (A) 03/31/2022 04:55 PM    Epithelial cells FEW 03/31/2022 04:55 PM    Bacteria 3+ (A) 03/31/2022 04:55 PM    WBC >100 (H) 03/31/2022 04:55 PM    RBC 5-10 03/31/2022 04:55 PM         Medications Reviewed:     Current Facility-Administered Medications   Medication Dose Route Frequency    0.9% sodium chloride infusion 250 mL  250 mL IntraVENous PRN    sucralfate (CARAFATE) tablet 1 g  1 g Oral QID    HYDROmorphone (DILAUDID) injection 0.5 mg  0.5 mg IntraVENous Q6H PRN    magnesium hydroxide (MILK OF MAGNESIA) 400 mg/5 mL oral suspension 30 mL  30 mL Oral DAILY PRN    [Held by provider] 0.9% sodium chloride 1,000 mL with potassium chloride 60 mEq infusion   IntraVENous CONTINUOUS    metoprolol tartrate (LOPRESSOR) tablet 50 mg  50 mg Oral Q12H    baclofen (LIORESAL) tablet 10 mg  10 mg Oral QID PRN    acetaminophen (TYLENOL) tablet 650 mg  650 mg Oral Q6H PRN    DULoxetine (CYMBALTA) capsule 60 mg  60 mg Oral DAILY    fentaNYL (DURAGESIC) 75 mcg/hr patch 1 Patch  1 Patch TransDERmal Q72H    gabapentin (NEURONTIN) capsule 100 mg  100 mg Oral TID    oxyCODONE IR (ROXICODONE) tablet 10 mg  10 mg Oral Q6H PRN    naloxone (NARCAN) injection 0.4 mg  0.4 mg IntraVENous EVERY 2 MINUTES AS NEEDED    balsam peru-castor oiL (VENELEX) ointment   Topical BID    pantoprazole (PROTONIX) injection 40 mg  40 mg IntraVENous Q12H    And    sodium chloride (NS) flush 10 mL  10 mL IntraVENous Q12H    bisacodyL (DULCOLAX) suppository 10 mg  10 mg Rectal DAILY PRN    [Held by provider] naloxegoL (MOVANTIK) tablet 25 mg  25 mg Oral DAILY    polyethylene glycol (MIRALAX) packet 17 g  17 g Oral BID    alteplase (CATHFLO) 1 mg in sterile water (preservative free) 1 mL injection  1 mg InterCATHeter PRN    bacitracin 500 unit/gram packet 1 Packet  1 Packet Topical PRN    heparin (porcine) pf 500 Units  500 Units InterCATHeter PRN     ______________________________________________________________________  EXPECTED LENGTH OF STAY: 3d 2h  ACTUAL LENGTH OF STAY:          [address], FNP

## 2022-04-03 LAB
ABO + RH BLD: NORMAL
ANION GAP SERPL CALC-SCNC: 2 MMOL/L (ref 5–15)
ANION GAP SERPL CALC-SCNC: 5 MMOL/L (ref 5–15)
ATRIAL RATE: 89 BPM
BLD PROD TYP BPU: NORMAL
BLD PROD TYP BPU: NORMAL
BLOOD GROUP ANTIBODIES SERPL: NORMAL
BPU ID: NORMAL
BPU ID: NORMAL
BUN SERPL-MCNC: 7 MG/DL (ref 6–20)
BUN SERPL-MCNC: 8 MG/DL (ref 6–20)
BUN/CREAT SERPL: 10 (ref 12–20)
BUN/CREAT SERPL: 9 (ref 12–20)
CALCIUM SERPL-MCNC: 11.8 MG/DL (ref 8.5–10.1)
CALCIUM SERPL-MCNC: 12.9 MG/DL (ref 8.5–10.1)
CALCULATED P AXIS, ECG09: 41 DEGREES
CALCULATED R AXIS, ECG10: 41 DEGREES
CALCULATED T AXIS, ECG11: 37 DEGREES
CHLORIDE SERPL-SCNC: 109 MMOL/L (ref 97–108)
CHLORIDE SERPL-SCNC: 111 MMOL/L (ref 97–108)
CO2 SERPL-SCNC: 24 MMOL/L (ref 21–32)
CO2 SERPL-SCNC: 28 MMOL/L (ref 21–32)
CREAT SERPL-MCNC: 0.76 MG/DL (ref 0.55–1.02)
CREAT SERPL-MCNC: 0.77 MG/DL (ref 0.55–1.02)
CROSSMATCH RESULT,%XM: NORMAL
CROSSMATCH RESULT,%XM: NORMAL
DIAGNOSIS, 93000: NORMAL
GLUCOSE SERPL-MCNC: 104 MG/DL (ref 65–100)
GLUCOSE SERPL-MCNC: 105 MG/DL (ref 65–100)
HCT VFR BLD AUTO: 23.7 % (ref 35–47)
HGB BLD-MCNC: 7.6 G/DL (ref 11.5–16)
MAGNESIUM SERPL-MCNC: 1.4 MG/DL (ref 1.6–2.4)
MAGNESIUM SERPL-MCNC: 1.9 MG/DL (ref 1.6–2.4)
P-R INTERVAL, ECG05: 154 MS
PHOSPHATE SERPL-MCNC: 2 MG/DL (ref 2.6–4.7)
PHOSPHATE SERPL-MCNC: 3.1 MG/DL (ref 2.6–4.7)
POTASSIUM SERPL-SCNC: 3.2 MMOL/L (ref 3.5–5.1)
POTASSIUM SERPL-SCNC: 3.7 MMOL/L (ref 3.5–5.1)
Q-T INTERVAL, ECG07: 314 MS
QRS DURATION, ECG06: 74 MS
QTC CALCULATION (BEZET), ECG08: 382 MS
SODIUM SERPL-SCNC: 139 MMOL/L (ref 136–145)
SODIUM SERPL-SCNC: 140 MMOL/L (ref 136–145)
SPECIMEN EXP DATE BLD: NORMAL
STATUS OF UNIT,%ST: NORMAL
STATUS OF UNIT,%ST: NORMAL
UNIT DIVISION, %UDIV: 0
UNIT DIVISION, %UDIV: 0
VENTRICULAR RATE, ECG03: 89 BPM

## 2022-04-03 PROCEDURE — 94760 N-INVAS EAR/PLS OXIMETRY 1: CPT

## 2022-04-03 PROCEDURE — 85018 HEMOGLOBIN: CPT

## 2022-04-03 PROCEDURE — 84100 ASSAY OF PHOSPHORUS: CPT

## 2022-04-03 PROCEDURE — 36591 DRAW BLOOD OFF VENOUS DEVICE: CPT

## 2022-04-03 PROCEDURE — 74011250637 HC RX REV CODE- 250/637: Performed by: NURSE PRACTITIONER

## 2022-04-03 PROCEDURE — 74011000250 HC RX REV CODE- 250: Performed by: NURSE PRACTITIONER

## 2022-04-03 PROCEDURE — 65660000000 HC RM CCU STEPDOWN

## 2022-04-03 PROCEDURE — C9113 INJ PANTOPRAZOLE SODIUM, VIA: HCPCS | Performed by: NURSE PRACTITIONER

## 2022-04-03 PROCEDURE — 74011250636 HC RX REV CODE- 250/636: Performed by: NURSE PRACTITIONER

## 2022-04-03 PROCEDURE — 83735 ASSAY OF MAGNESIUM: CPT

## 2022-04-03 PROCEDURE — 80048 BASIC METABOLIC PNL TOTAL CA: CPT

## 2022-04-03 PROCEDURE — 74011250637 HC RX REV CODE- 250/637: Performed by: STUDENT IN AN ORGANIZED HEALTH CARE EDUCATION/TRAINING PROGRAM

## 2022-04-03 PROCEDURE — 74011250636 HC RX REV CODE- 250/636: Performed by: STUDENT IN AN ORGANIZED HEALTH CARE EDUCATION/TRAINING PROGRAM

## 2022-04-03 RX ORDER — POTASSIUM CHLORIDE 750 MG/1
40 TABLET, FILM COATED, EXTENDED RELEASE ORAL
Status: COMPLETED | OUTPATIENT
Start: 2022-04-03 | End: 2022-04-03

## 2022-04-03 RX ORDER — MAGNESIUM SULFATE HEPTAHYDRATE 40 MG/ML
2 INJECTION, SOLUTION INTRAVENOUS ONCE
Status: COMPLETED | OUTPATIENT
Start: 2022-04-03 | End: 2022-04-03

## 2022-04-03 RX ORDER — SODIUM,POTASSIUM PHOSPHATES 280-250MG
1 POWDER IN PACKET (EA) ORAL ONCE
Status: COMPLETED | OUTPATIENT
Start: 2022-04-03 | End: 2022-04-03

## 2022-04-03 RX ADMIN — HYDROMORPHONE HYDROCHLORIDE 0.5 MG: 1 INJECTION, SOLUTION INTRAMUSCULAR; INTRAVENOUS; SUBCUTANEOUS at 16:39

## 2022-04-03 RX ADMIN — METOPROLOL TARTRATE 50 MG: 50 TABLET, FILM COATED ORAL at 21:37

## 2022-04-03 RX ADMIN — HYDROMORPHONE HYDROCHLORIDE 0.5 MG: 1 INJECTION, SOLUTION INTRAMUSCULAR; INTRAVENOUS; SUBCUTANEOUS at 09:30

## 2022-04-03 RX ADMIN — Medication 10 ML: at 05:58

## 2022-04-03 RX ADMIN — PANTOPRAZOLE SODIUM 40 MG: 40 INJECTION, POWDER, FOR SOLUTION INTRAVENOUS at 09:22

## 2022-04-03 RX ADMIN — SODIUM CHLORIDE, PRESERVATIVE FREE 10 ML: 5 INJECTION INTRAVENOUS at 21:38

## 2022-04-03 RX ADMIN — Medication 10 ML: at 21:38

## 2022-04-03 RX ADMIN — Medication 10 ML: at 12:17

## 2022-04-03 RX ADMIN — SUCRALFATE 1 G: 1 TABLET ORAL at 09:22

## 2022-04-03 RX ADMIN — DULOXETINE HYDROCHLORIDE 60 MG: 60 CAPSULE, DELAYED RELEASE ORAL at 09:22

## 2022-04-03 RX ADMIN — OXYCODONE 10 MG: 5 TABLET ORAL at 02:05

## 2022-04-03 RX ADMIN — SODIUM PHOSPHATE, MONOBASIC, MONOHYDRATE: 276; 142 INJECTION, SOLUTION INTRAVENOUS at 11:39

## 2022-04-03 RX ADMIN — GABAPENTIN 100 MG: 100 CAPSULE ORAL at 21:37

## 2022-04-03 RX ADMIN — METOPROLOL TARTRATE 50 MG: 50 TABLET, FILM COATED ORAL at 09:22

## 2022-04-03 RX ADMIN — SODIUM CHLORIDE, PRESERVATIVE FREE 10 ML: 5 INJECTION INTRAVENOUS at 09:22

## 2022-04-03 RX ADMIN — GABAPENTIN 100 MG: 100 CAPSULE ORAL at 09:22

## 2022-04-03 RX ADMIN — SUCRALFATE 1 G: 1 TABLET ORAL at 21:37

## 2022-04-03 RX ADMIN — HYDROMORPHONE HYDROCHLORIDE 0.5 MG: 1 INJECTION, SOLUTION INTRAMUSCULAR; INTRAVENOUS; SUBCUTANEOUS at 22:26

## 2022-04-03 RX ADMIN — MAGNESIUM SULFATE IN WATER 2 G: 40 INJECTION, SOLUTION INTRAVENOUS at 07:14

## 2022-04-03 RX ADMIN — OXYCODONE 10 MG: 5 TABLET ORAL at 14:24

## 2022-04-03 RX ADMIN — Medication: at 09:21

## 2022-04-03 RX ADMIN — PANTOPRAZOLE SODIUM 40 MG: 40 INJECTION, POWDER, FOR SOLUTION INTRAVENOUS at 21:38

## 2022-04-03 RX ADMIN — HYDROMORPHONE HYDROCHLORIDE 0.5 MG: 1 INJECTION, SOLUTION INTRAMUSCULAR; INTRAVENOUS; SUBCUTANEOUS at 03:39

## 2022-04-03 RX ADMIN — Medication: at 16:37

## 2022-04-03 RX ADMIN — POTASSIUM & SODIUM PHOSPHATES POWDER PACK 280-160-250 MG 1 PACKET: 280-160-250 PACK at 07:14

## 2022-04-03 RX ADMIN — SUCRALFATE 1 G: 1 TABLET ORAL at 12:17

## 2022-04-03 RX ADMIN — GABAPENTIN 100 MG: 100 CAPSULE ORAL at 16:37

## 2022-04-03 RX ADMIN — SUCRALFATE 1 G: 1 TABLET ORAL at 16:37

## 2022-04-03 RX ADMIN — POTASSIUM CHLORIDE 40 MEQ: 750 TABLET, EXTENDED RELEASE ORAL at 21:38

## 2022-04-03 RX ADMIN — POLYETHYLENE GLYCOL 3350 17 G: 17 POWDER, FOR SOLUTION ORAL at 09:21

## 2022-04-03 RX ADMIN — NALOXEGOL OXALATE 25 MG: 25 TABLET, FILM COATED ORAL at 10:49

## 2022-04-03 NOTE — PROGRESS NOTES
[facility] Adult  Hospitalist Group                                                                                          Hospitalist Progress Note  [place]  Answering service: [phone] OR [phone] from in house phone        Date of Service:  4/3/2022  NAME:  Rosales Harris  :  1990  MRN:  [id number]      Admission Summary:   From ED H&P \"27year-old female with past medical history of metastatic breast cancer with cord compression. , presenting with complaints of abdominal pain, constipation, urinary retention, in the setting of a history of known metastatic breast cancer, with spinal metastasis. Patient was evaluated here in January for urinary retention and lower extremity weakness, discovered to have cervical, thoracic and lumbar metastatic disease, underwent lumbar laminectomy by neurosurgical service. Patient with urinary retention thereafter, the did not resolve, requiring the addition of Ochoa catheter. Patient states approximately 7 to 10 days of constipation, with small bowel movements produced with the use of enemas. She states urinary retention developed today despite the fact that there is a Ochoa catheter in place. She endorses generalized abdominal tenderness, mild nausea with one episode of emesis. She states she has been using MiraLAX and stool softeners, to offset the use of narcotic pain control which she has been taking since she was discharged from rehab. Patient endorses mildly decreased strength in the bilateral lower extremities. Physical exam is remarkable for a well-appearing young female, in no acute distress noted to be normotensive, afebrile, mildly tachycardic, satting well on room air. Bedside bladder scan with greater than 900 cc of urine, likely obstructed Ochoa catheter. Plan to replace Ochoa catheter, obtain CMP, CBC, UA, lipase, CT scan of the abdomen and pelvis.   We will consider spinal imaging, reassess, and make a disposition. \"    Interval history / Subjective:    No obvious acute distress noted. No reported episodes of melena overnight. Reports larger bowel movement overnight. Electrolyte derangements repleted. Pressure elevated this a.m. higher to receiving her morning dose of metoprolol, will continue to monitor. Anticipate discharge in 1 - 2 days pending labs. Assessment & Plan:     Abdominal pain  Constipation  -GI consulted,   -Movantik started will hold if patient has additional episodes of melena no melena endorsed overnight.  -increase MiraLAX to twice daily  -Seems to be chronic issue likely related to opioid pain regimen. NAHED  Hypercalcemia  Hypokalemia  - EKG, NSR, presently asymptomatic  -Patient volume resuscitated on admission   -Potassium improved at 4.1  -Calcium trending down, continue to monitor, appreciate oncology recommendations  -Trend BMP, mag, Phos, replete as indicated  Melena   -Occult stool, positive  -GI following, will like to avoid endoscopy or colonoscopy. We will continue conservative management.  -Protonix BID  -Trend CBC, transfuse for hgb less than 7  History of metastatic breast cancer with cord compression  -Continue morphine, continue oxycodone prn  -Continue supportive care  -Oncology consulted    Code status: Full  DVT prophylaxis: SCDs    Care Plan discussed with: Patient/Family  Anticipated Disposition: Home w/Family  Anticipated Discharge: 24 hours to 48 hours     Hospital Problems  Date Reviewed: 4/1/2022          Codes Class Noted POA    Hypoalbuminemia ICD-10-CM: E88.09  ICD-9-CM: 273.8  4/1/2022 Unknown        Hypokalemia ICD-10-CM: E87.6  ICD-9-CM: 276.8  4/1/2022 Unknown        NAHED (acute kidney injury) ([facility] [address] ICD-10-CM: N17.9  ICD-9-CM: 584.9  3/31/2022 Unknown        Cord compression syndrome ([facility] [address] ICD-10-CM: G95.20  ICD-9-CM: 336.9  1/19/2022 Yes                Review of Systems:   Pertinent items are noted in HPI.        Vital Signs:    Last 24hrs VS reviewed since prior progress note. Most recent are:  Visit Vitals  BP (!) 161/104 (BP 1 Location: Left arm, BP Patient Position: At rest)   Pulse 84   Temp 97.8 °F (36.6 °C)   Resp 18   Ht 5' 2\" (1.575 m)   Wt 81.3 kg (179 lb 3.7 oz)   SpO2 98%   BMI 32.78 kg/m²         Intake/Output Summary (Last 24 hours) at 4/3/2022 1332  Last data filed at 4/3/2022 1234  Gross per 24 hour   Intake 198 ml   Output 2050 ml   Net -1852 ml        Physical Examination:     I had a face to face encounter with this patient and independently examined them on 4/3/2022 as outlined below:          Constitutional:  No acute distress, cooperative, pleasant    ENT:  Oral mucosa moist, oropharynx benign. Resp:  CTA bilaterally. No wheezing/rhonchi/rales. No accessory muscle use   CV:  Regular rhythm, normal rate, no murmurs    GI:  Soft, distended, non tender. normoactive bowel sounds,     Musculoskeletal:  No edema, warm, 2+ pulses throughout    Neurologic:  Moves all extremities. AAOx3            Data Review:    Review and/or order of clinical lab test  Review and/or order of tests in the radiology section of CPT  Review and/or order of tests in the medicine section of CPT      Labs:     Recent Labs     04/03/22  0603 04/02/22  2147 04/02/22  1415 04/02/22  0253 04/01/22  0122 04/01/22  0122   WBC  --   --   --  5.8  --  5.9   HGB 7.6* 7.5*   < > 5.7*   < > 7.1*   HCT 23.7* 23.7*   < > 19.0*   < > 22.4*   PLT  --   --   --  200  --  219    < > = values in this interval not displayed. Recent Labs     04/03/22  0205 04/02/22  0046 04/01/22 2136 04/01/22 1914 04/01/22 1914    142  --   --  140   K 3.7 4.1  --   --  3.7   * 112*  --   --  108   CO2 24 25  --   --  27   BUN 7 11  --   --  12   CREA 0.76 0.75  --   --  0.88   * 95  --   --  95   CA 12.9* 12.8* 14.2*   < > 13.1*   MG 1.4* 1.5*  --   --  1.7   PHOS 2.0* 2.7  --   --  2.6    < > = values in this interval not displayed.      Recent Labs     04/02/22  0046 04/01/22  0122 03/31/22  1655   ALT 26 38 48   * 290* 324*   TBILI 0.5 0.4 0.4   TP 6.0* 6.3* 7.2   ALB 3.3* 2.2* 2.6*   GLOB 2.7 4.1* 4.6*   LPSE  --   --  38*     No results for input(s): INR, PTP, APTT, INREXT, INREXT in the last 72 hours. No results for input(s): FE, TIBC, PSAT, FERR in the last 72 hours. No results found for: FOL, RBCF   No results for input(s): PH, PCO2, PO2 in the last 72 hours. No results for input(s): CPK, CKNDX, TROIQ in the last 72 hours.     No lab exists for component: CPKMB  No results found for: CHOL, CHOLX, CHLST, CHOLV, HDL, HDLP, LDL, LDLC, DLDLP, TGLX, TRIGL, TRIGP, CHHD, CHHDX  No results found for: [facility]  Lab Results   Component Value Date/Time    Color STRAW 03/31/2022 04:55 PM    Appearance CLEAR 03/31/2022 04:55 PM    Specific gravity 1.015 03/31/2022 04:55 PM    pH (UA) 5.5 03/31/2022 04:55 PM    Protein 30 (A) 03/31/2022 04:55 PM    Glucose Negative 03/31/2022 04:55 PM    Ketone Negative 03/31/2022 04:55 PM    Bilirubin Negative 03/31/2022 04:55 PM    Urobilinogen 0.2 03/31/2022 04:55 PM    Nitrites Negative 03/31/2022 04:55 PM    Leukocyte Esterase LARGE (A) 03/31/2022 04:55 PM    Epithelial cells FEW 03/31/2022 04:55 PM    Bacteria 3+ (A) 03/31/2022 04:55 PM    WBC >100 (H) 03/31/2022 04:55 PM    RBC 5-10 03/31/2022 04:55 PM         Medications Reviewed:     Current Facility-Administered Medications   Medication Dose Route Frequency    sodium phosphate 13.5 mmol in 0.9% sodium chloride 250 mL infusion   IntraVENous ONCE    0.9% sodium chloride infusion 250 mL  250 mL IntraVENous PRN    saline peripheral flush soln 5-40 mL  5-40 mL InterCATHeter PRN    SALINE PERIPHERAL FLUSH Q8H soln 5-40 mL  5-40 mL InterCATHeter Q8H    sucralfate (CARAFATE) tablet 1 g  1 g Oral QID    HYDROmorphone (DILAUDID) injection 0.5 mg  0.5 mg IntraVENous Q6H PRN    magnesium hydroxide (MILK OF MAGNESIA) 400 mg/5 mL oral suspension 30 mL  30 mL Oral DAILY PRN    metoprolol tartrate (LOPRESSOR) tablet 50 mg  50 mg Oral Q12H    baclofen (LIORESAL) tablet 10 mg  10 mg Oral QID PRN    acetaminophen (TYLENOL) tablet 650 mg  650 mg Oral Q6H PRN    DULoxetine (CYMBALTA) capsule 60 mg  60 mg Oral DAILY    fentaNYL (DURAGESIC) 75 mcg/hr patch 1 Patch  1 Patch TransDERmal Q72H    gabapentin (NEURONTIN) capsule 100 mg  100 mg Oral TID    oxyCODONE IR (ROXICODONE) tablet 10 mg  10 mg Oral Q6H PRN    naloxone (NARCAN) injection 0.4 mg  0.4 mg IntraVENous EVERY 2 MINUTES AS NEEDED    balsam peru-castor oiL (VENELEX) ointment   Topical BID    pantoprazole (PROTONIX) injection 40 mg  40 mg IntraVENous Q12H    And    sodium chloride (NS) flush 10 mL  10 mL IntraVENous Q12H    bisacodyL (DULCOLAX) suppository 10 mg  10 mg Rectal DAILY PRN    naloxegoL (MOVANTIK) tablet 25 mg  25 mg Oral DAILY    polyethylene glycol (MIRALAX) packet 17 g  17 g Oral BID    alteplase (CATHFLO) 1 mg in sterile water (preservative free) 1 mL injection  1 mg InterCATHeter PRN    bacitracin 500 unit/gram packet 1 Packet  1 Packet Topical PRN    heparin (porcine) pf 500 Units  500 Units InterCATHeter PRN     ______________________________________________________________________  EXPECTED LENGTH OF STAY: 3d 2h  ACTUAL LENGTH OF STAY:          [address], FNP

## 2022-04-03 NOTE — PROGRESS NOTES
04/03/22 0813   Vital Signs   Temp 97.8 °F (36.6 °C)   Temp Source Oral   Pulse (Heart Rate) 70   Heart Rate Source Monitor   Resp Rate 18   O2 Sat (%) 100 %   Level of Consciousness Alert (0)   BP (!) 161/104  (notified RN)   MAP (Calculated) 123   BP 1 Method Automatic   BP 1 Location Left arm   BP Patient Position At rest   MEWS Score 1   NP Andrés aware of above BP, patient has not yet taken am oral lopressor, no other orders received per NP at this time, pt denies any s/s of HTN

## 2022-04-04 LAB
1,25(OH)2D3 SERPL-MCNC: <5 PG/ML (ref 19.9–79.3)
ANION GAP SERPL CALC-SCNC: 5 MMOL/L (ref 5–15)
BASOPHILS # BLD: 0 K/UL (ref 0–0.1)
BASOPHILS NFR BLD: 0 % (ref 0–1)
BUN SERPL-MCNC: 8 MG/DL (ref 6–20)
BUN/CREAT SERPL: 11 (ref 12–20)
CALCIUM SERPL-MCNC: 11.9 MG/DL (ref 8.5–10.1)
CHLORIDE SERPL-SCNC: 108 MMOL/L (ref 97–108)
CO2 SERPL-SCNC: 26 MMOL/L (ref 21–32)
CREAT SERPL-MCNC: 0.7 MG/DL (ref 0.55–1.02)
DIFFERENTIAL METHOD BLD: ABNORMAL
EOSINOPHIL # BLD: 0.4 K/UL (ref 0–0.4)
EOSINOPHIL NFR BLD: 5 % (ref 0–7)
ERYTHROCYTE [DISTWIDTH] IN BLOOD BY AUTOMATED COUNT: 19.3 % (ref 11.5–14.5)
FOLATE SERPL-MCNC: 7.3 NG/ML (ref 5–21)
GLUCOSE SERPL-MCNC: 85 MG/DL (ref 65–100)
HCT VFR BLD AUTO: 24.5 % (ref 35–47)
HGB BLD-MCNC: 7.8 G/DL (ref 11.5–16)
IMM GRANULOCYTES # BLD AUTO: 0 K/UL
IMM GRANULOCYTES NFR BLD AUTO: 0 %
IRON SATN MFR SERPL: 34 % (ref 20–50)
IRON SERPL-MCNC: 48 UG/DL (ref 35–150)
LYMPHOCYTES # BLD: 2.1 K/UL (ref 0.8–3.5)
LYMPHOCYTES NFR BLD: 27 % (ref 12–49)
MAGNESIUM SERPL-MCNC: 1.7 MG/DL (ref 1.6–2.4)
MAGNESIUM SERPL-MCNC: 1.9 MG/DL (ref 1.6–2.4)
MCH RBC QN AUTO: 25.5 PG (ref 26–34)
MCHC RBC AUTO-ENTMCNC: 31.8 G/DL (ref 30–36.5)
MCV RBC AUTO: 80.1 FL (ref 80–99)
METAMYELOCYTES NFR BLD MANUAL: 3 %
MONOCYTES # BLD: 0.5 K/UL (ref 0–1)
MONOCYTES NFR BLD: 7 % (ref 5–13)
MYELOCYTES NFR BLD MANUAL: 5 %
NEUTS SEG # BLD: 4 K/UL (ref 1.8–8)
NEUTS SEG NFR BLD: 53 % (ref 32–75)
NRBC # BLD: 0.16 K/UL (ref 0–0.01)
NRBC BLD-RTO: 2.1 PER 100 WBC
PHOSPHATE SERPL-MCNC: 3 MG/DL (ref 2.6–4.7)
PLATELET # BLD AUTO: 209 K/UL (ref 150–400)
PMV BLD AUTO: 9.6 FL (ref 8.9–12.9)
POTASSIUM SERPL-SCNC: 3.3 MMOL/L (ref 3.5–5.1)
RBC # BLD AUTO: 3.06 M/UL (ref 3.8–5.2)
RBC MORPH BLD: ABNORMAL
RBC MORPH BLD: ABNORMAL
SODIUM SERPL-SCNC: 139 MMOL/L (ref 136–145)
TIBC SERPL-MCNC: 140 UG/DL (ref 250–450)
WBC # BLD AUTO: 7.6 K/UL (ref 3.6–11)

## 2022-04-04 PROCEDURE — 74011000250 HC RX REV CODE- 250: Performed by: NURSE PRACTITIONER

## 2022-04-04 PROCEDURE — 36591 DRAW BLOOD OFF VENOUS DEVICE: CPT

## 2022-04-04 PROCEDURE — 74011250636 HC RX REV CODE- 250/636: Performed by: STUDENT IN AN ORGANIZED HEALTH CARE EDUCATION/TRAINING PROGRAM

## 2022-04-04 PROCEDURE — 82746 ASSAY OF FOLIC ACID SERUM: CPT

## 2022-04-04 PROCEDURE — 83735 ASSAY OF MAGNESIUM: CPT

## 2022-04-04 PROCEDURE — 84100 ASSAY OF PHOSPHORUS: CPT

## 2022-04-04 PROCEDURE — 80048 BASIC METABOLIC PNL TOTAL CA: CPT

## 2022-04-04 PROCEDURE — 74011250637 HC RX REV CODE- 250/637: Performed by: NURSE PRACTITIONER

## 2022-04-04 PROCEDURE — 74011250637 HC RX REV CODE- 250/637: Performed by: STUDENT IN AN ORGANIZED HEALTH CARE EDUCATION/TRAINING PROGRAM

## 2022-04-04 PROCEDURE — 65660000000 HC RM CCU STEPDOWN

## 2022-04-04 PROCEDURE — 83540 ASSAY OF IRON: CPT

## 2022-04-04 PROCEDURE — 74011250636 HC RX REV CODE- 250/636: Performed by: NURSE PRACTITIONER

## 2022-04-04 PROCEDURE — C9113 INJ PANTOPRAZOLE SODIUM, VIA: HCPCS | Performed by: NURSE PRACTITIONER

## 2022-04-04 PROCEDURE — 74011250636 HC RX REV CODE- 250/636: Performed by: INTERNAL MEDICINE

## 2022-04-04 PROCEDURE — 85025 COMPLETE CBC W/AUTO DIFF WBC: CPT

## 2022-04-04 RX ORDER — POLYETHYLENE GLYCOL 3350 17 G/17G
17 POWDER, FOR SOLUTION ORAL DAILY
Status: DISCONTINUED | OUTPATIENT
Start: 2022-04-05 | End: 2022-04-05 | Stop reason: HOSPADM

## 2022-04-04 RX ORDER — SODIUM CHLORIDE 9 MG/ML
75 INJECTION, SOLUTION INTRAVENOUS CONTINUOUS
Status: DISCONTINUED | OUTPATIENT
Start: 2022-04-04 | End: 2022-04-05 | Stop reason: HOSPADM

## 2022-04-04 RX ORDER — POTASSIUM CHLORIDE 750 MG/1
40 TABLET, FILM COATED, EXTENDED RELEASE ORAL DAILY
Status: DISCONTINUED | OUTPATIENT
Start: 2022-04-05 | End: 2022-04-05

## 2022-04-04 RX ADMIN — METOPROLOL TARTRATE 50 MG: 50 TABLET, FILM COATED ORAL at 21:07

## 2022-04-04 RX ADMIN — OXYCODONE 10 MG: 5 TABLET ORAL at 01:56

## 2022-04-04 RX ADMIN — SODIUM CHLORIDE 75 ML/HR: 9 INJECTION, SOLUTION INTRAVENOUS at 14:53

## 2022-04-04 RX ADMIN — HYDROMORPHONE HYDROCHLORIDE 0.5 MG: 1 INJECTION, SOLUTION INTRAMUSCULAR; INTRAVENOUS; SUBCUTANEOUS at 16:41

## 2022-04-04 RX ADMIN — GABAPENTIN 100 MG: 100 CAPSULE ORAL at 16:42

## 2022-04-04 RX ADMIN — Medication 10 ML: at 21:08

## 2022-04-04 RX ADMIN — Medication: at 09:01

## 2022-04-04 RX ADMIN — DULOXETINE HYDROCHLORIDE 60 MG: 60 CAPSULE, DELAYED RELEASE ORAL at 09:00

## 2022-04-04 RX ADMIN — NALOXEGOL OXALATE 25 MG: 25 TABLET, FILM COATED ORAL at 09:00

## 2022-04-04 RX ADMIN — Medication 10 ML: at 09:00

## 2022-04-04 RX ADMIN — Medication 10 ML: at 16:41

## 2022-04-04 RX ADMIN — GABAPENTIN 100 MG: 100 CAPSULE ORAL at 08:59

## 2022-04-04 RX ADMIN — SODIUM CHLORIDE, PRESERVATIVE FREE 10 ML: 5 INJECTION INTRAVENOUS at 21:08

## 2022-04-04 RX ADMIN — OXYCODONE 10 MG: 5 TABLET ORAL at 18:37

## 2022-04-04 RX ADMIN — SUCRALFATE 1 G: 1 TABLET ORAL at 21:07

## 2022-04-04 RX ADMIN — Medication: at 18:39

## 2022-04-04 RX ADMIN — PANTOPRAZOLE SODIUM 40 MG: 40 INJECTION, POWDER, FOR SOLUTION INTRAVENOUS at 09:00

## 2022-04-04 RX ADMIN — Medication 10 ML: at 06:18

## 2022-04-04 RX ADMIN — SUCRALFATE 1 G: 1 TABLET ORAL at 14:55

## 2022-04-04 RX ADMIN — Medication 10 ML: at 14:57

## 2022-04-04 RX ADMIN — ZOLEDRONIC ACID 4 MG: 0.04 INJECTION, SOLUTION INTRAVENOUS at 14:53

## 2022-04-04 RX ADMIN — SUCRALFATE 1 G: 1 TABLET ORAL at 16:42

## 2022-04-04 RX ADMIN — SUCRALFATE 1 G: 1 TABLET ORAL at 08:59

## 2022-04-04 RX ADMIN — GABAPENTIN 100 MG: 100 CAPSULE ORAL at 21:07

## 2022-04-04 RX ADMIN — PANTOPRAZOLE SODIUM 40 MG: 40 INJECTION, POWDER, FOR SOLUTION INTRAVENOUS at 21:07

## 2022-04-04 RX ADMIN — SODIUM CHLORIDE, PRESERVATIVE FREE 10 ML: 5 INJECTION INTRAVENOUS at 09:00

## 2022-04-04 RX ADMIN — HYDROMORPHONE HYDROCHLORIDE 0.5 MG: 1 INJECTION, SOLUTION INTRAMUSCULAR; INTRAVENOUS; SUBCUTANEOUS at 09:00

## 2022-04-04 RX ADMIN — METOPROLOL TARTRATE 50 MG: 50 TABLET, FILM COATED ORAL at 08:59

## 2022-04-04 NOTE — PROGRESS NOTES
Hematology-Oncology Progress Note    Jeremie Hernandes  1990  [id number]  4/4/2022    Follow-up for: metastatic breast cancer     [x]        Chart notes since last visit reviewed   [x]        Medications reviewed for allergies and interactions       Case discussed with the following:         []                            [x]        Nursing Staff                                                                         []        Pathologist                                                                        []        FAMILY      Subjective:     Spoke with patient who complains of: doing better, pain is controlled    Objective:   Patient Vitals for the past 24 hrs:   BP Temp Pulse Resp SpO2   04/04/22 1000   79     04/04/22 0836 (!) 145/97 97.4 °F (36.3 °C) 84 18 100 %   04/04/22 0605   87     04/04/22 0412   78     04/04/22 0217   79     04/04/22 0148 (!) 143/95 98.1 °F (36.7 °C) 71 17 100 %   04/04/22 0000   79     04/03/22 2210   82     04/03/22 2017   78     04/03/22 1955   81     04/03/22 1920 (!) 160/108 97.9 °F (36.6 °C) 77 18 99 %   04/03/22 1800   82     04/03/22 1600   80     04/03/22 1418 (!) 149/89 97.9 °F (36.6 °C) 81 18 99 %   04/03/22 1400   80     04/03/22 1200   80         REVIEW OF SYSTEMS:    Constitutional: negative fever, negative chills, negative weight loss  Eyes:   negative visual changes  ENT:   negative sore throat, tongue or lip swelling  Respiratory:  negative cough, negative dyspnea  Cards:  negative for chest pain, palpitations, lower extremity edema  GI:   negative for nausea, vomiting, diarrhea, and abdominal pain  Neuro:  negative for headaches, dizziness, vertigo  []                        Full ROS o/w normal/non contributor    Constitutional:  Patient looks  []        Sick  []        Frail  [x]        Better                                                 []        Depressed    HEENT:  [x]   NC                         [] AT               []    ALOPECIA           Eyes: []   Normal               []    Icteric  Oropharynx: [x]    Normal                  []  Thrush               []   Dry  Mucositis: []    None                 Grade: []        I  []        II  []        III  []        IV  Neck:   [x]   Supple                  []  Rigid               JVD:    []   ABSENT       []   PRESENT  Lymphadenopathy:   []   None Noted            []   PRESENT    Chest:  [x]   Clear               []    Rhonchi                      Dec'd @     []  Right Base           []   Left Base    CV:             [x]   Regular              []  Irregular               []   Tachy                []   Murmur  Abdominal:   [x]    Soft              []   NON-tender               []   Tender      BS:    []   ABSENT                   []   PRESENT  Liver:     [x]  NON-palp                  []   EDGE- palp  Spleen: [x]   NON-palp                   []  EDGE - palp  Mass:   []   ABSENT                          []  PRESENT  Extr:    []  Lymphedema             []   Cyanosis      []  Clubbing  Edema:     [x]   NONE       []   PRESENT  Skin:  Intact [x]           Purpura []        Rash: []   ABSENT       []  PRESENT  Neuro:  [x]        Normal  []        Confused      Available labs reviewed:  Labs:    Recent Results (from the past 24 hour(s))   METABOLIC PANEL, BASIC    Collection Time: 04/03/22  6:05 PM   Result Value Ref Range    Sodium 139 136 - 145 mmol/L    Potassium 3.2 (L) 3.5 - 5.1 mmol/L    Chloride 109 (H) 97 - 108 mmol/L    CO2 28 21 - 32 mmol/L    Anion gap 2 (L) 5 - 15 mmol/L    Glucose 105 (H) 65 - 100 mg/dL    BUN 8 6 - 20 MG/DL    Creatinine 0.77 0.55 - 1.02 MG/DL    BUN/Creatinine ratio 10 (L) 12 - 20      GFR est AA >60 >60 ml/min/1.73m2    GFR est non-AA >60 >60 ml/min/1.73m2    Calcium 11.8 (H) 8.5 - 10.1 MG/DL   PHOSPHORUS    Collection Time: 04/03/22  6:05 PM   Result Value Ref Range    Phosphorus 3.1 2.6 - 4.7 MG/DL   MAGNESIUM    Collection Time: 04/03/22 6:05 PM   Result Value Ref Range    Magnesium 1.9 1.6 - 2.4 mg/dL   CBC WITH AUTOMATED DIFF    Collection Time: 04/04/22 12:42 AM   Result Value Ref Range    WBC 7.6 3.6 - 11.0 K/uL    RBC 3.06 (L) 3.80 - 5.20 M/uL    HGB 7.8 (L) 11.5 - 16.0 g/dL    HCT 24.5 (L) 35.0 - 47.0 %    MCV 80.1 80.0 - 99.0 FL    MCH 25.5 (L) 26.0 - 34.0 PG    MCHC 31.8 30.0 - 36.5 g/dL    RDW 19.3 (H) 11.5 - 14.5 %    PLATELET 521 605 - 202 K/uL    MPV 9.6 8.9 - 12.9 FL    NRBC 2.1 (H) 0  WBC    ABSOLUTE NRBC 0.16 (H) 0.00 - 0.01 K/uL    NEUTROPHILS 53 32 - 75 %    LYMPHOCYTES 27 12 - 49 %    MONOCYTES 7 5 - 13 %    EOSINOPHILS 5 0 - 7 %    BASOPHILS 0 0 - 1 %    METAMYELOCYTES 3 (H) 0 %    MYELOCYTES 5 (H) 0 %    IMMATURE GRANULOCYTES 0 %    ABS. NEUTROPHILS 4.0 1.8 - 8.0 K/UL    ABS. LYMPHOCYTES 2.1 0.8 - 3.5 K/UL    ABS. MONOCYTES 0.5 0.0 - 1.0 K/UL    ABS. EOSINOPHILS 0.4 0.0 - 0.4 K/UL    ABS. BASOPHILS 0.0 0.0 - 0.1 K/UL    ABS. IMM. GRANS. 0.0 K/UL    DF MANUAL      RBC COMMENTS ANISOCYTOSIS  1+        RBC COMMENTS POLYCHROMASIA  PRESENT       METABOLIC PANEL, BASIC    Collection Time: 04/04/22 12:42 AM   Result Value Ref Range    Sodium 139 136 - 145 mmol/L    Potassium 3.3 (L) 3.5 - 5.1 mmol/L    Chloride 108 97 - 108 mmol/L    CO2 26 21 - 32 mmol/L    Anion gap 5 5 - 15 mmol/L    Glucose 85 65 - 100 mg/dL    BUN 8 6 - 20 MG/DL    Creatinine 0.70 0.55 - 1.02 MG/DL    BUN/Creatinine ratio 11 (L) 12 - 20      GFR est AA >60 >60 ml/min/1.73m2    GFR est non-AA >60 >60 ml/min/1.73m2    Calcium 11.9 (H) 8.5 - 10.1 MG/DL   PHOSPHORUS    Collection Time: 04/04/22 12:42 AM   Result Value Ref Range    Phosphorus 3.0 2.6 - 4.7 MG/DL   MAGNESIUM    Collection Time: 04/04/22 12:42 AM   Result Value Ref Range    Magnesium 1.9 1.6 - 2.4 mg/dL       Available Xrays reviewed:    Chemotherapy monitored and toxicities assessed:    Assessment and Plan   1. Metastatic breast cancer. . pt is on femara/lupron .  doing well with it so far, plan is for out-pt xrt to L/S spine when surgical wound heals. zometa as out-pt  2. Opioid induced constipation  3.  Melena/gi bleed,, no further bleeding this am.   hgb 7.8 today , stable, gi following    Mari Qureshi MD

## 2022-04-04 NOTE — PROGRESS NOTES
[facility]  Hospitalist Group                                                                                          Hospitalist Progress Note  Evgeny Castellano [place]  Answering service: [phone] OR [phone] from in house phone        Date of Service:  2022  NAME:  Rashard Erickson  :  1990  MRN:  [id number]      Admission Summary:   From ED H&P \"27year-old female with past medical history of metastatic breast cancer with cord compression. , presenting with complaints of abdominal pain, constipation, urinary retention, in the setting of a history of known metastatic breast cancer, with spinal metastasis. Patient was evaluated here in January for urinary retention and lower extremity weakness, discovered to have cervical, thoracic and lumbar metastatic disease, underwent lumbar laminectomy by neurosurgical service. Patient with urinary retention thereafter, the did not resolve, requiring the addition of Ochoa catheter. Patient states approximately 7 to 10 days of constipation, with small bowel movements produced with the use of enemas. She states urinary retention developed today despite the fact that there is a Ochoa catheter in place. She endorses generalized abdominal tenderness, mild nausea with one episode of emesis. She states she has been using MiraLAX and stool softeners, to offset the use of narcotic pain control which she has been taking since she was discharged from rehab. Patient endorses mildly decreased strength in the bilateral lower extremities. Physical exam is remarkable for a well-appearing young female, in no acute distress noted to be normotensive, afebrile, mildly tachycardic, satting well on room air. Bedside bladder scan with greater than 900 cc of urine, likely obstructed Ochoa catheter. Plan to replace Ochoa catheter, obtain CMP, CBC, UA, lipase, CT scan of the abdomen and pelvis.   We will consider spinal imaging, reassess, and make a disposition. \"    Interval history / Subjective:    No obvious acute distress noted. No reported episodes of melena overnight. Assessment & Plan:     Abdominal pain  Constipation  -GI consulted,   -Movantik started will hold if patient has additional episodes of melena no melena endorsed overnight.  -increase MiraLAX to twice daily  -Seems to be chronic issue likely related to opioid pain regimen. NAHED  Hypercalcemia  Hypokalemia  - Zometa as outpatient therapy, will defer to oncology  - EKG, NSR, presently asymptomatic  -Patient volume resuscitated on admission   -Potassium improved at 4.1  -Calcium trending down, continue to monitor, appreciate oncology recommendations  -Trend BMP, mag, Phos, replete as indicated  Melena   -Occult stool, positive  -GI following, will like to avoid endoscopy or colonoscopy. We will continue conservative management.  -Protonix BID  -Trend CBC, transfuse for hgb less than 7  History of metastatic breast cancer with cord compression  -Continue morphine, continue oxycodone prn  -Continue supportive care  -Oncology consulted    Code status: Full  DVT prophylaxis: SCDs    Care Plan discussed with: Patient/Family  Anticipated Disposition: Home w/Family  Anticipated Discharge: 24 hours to 48 hours     Hospital Problems  Date Reviewed: 4/1/2022          Codes Class Noted POA    Hypoalbuminemia ICD-10-CM: E88.09  ICD-9-CM: 273.8  4/1/2022 Unknown        Hypokalemia ICD-10-CM: E87.6  ICD-9-CM: 276.8  4/1/2022 Unknown        NAHED (acute kidney injury) ([facility] [address] ICD-10-CM: N17.9  ICD-9-CM: 584.9  3/31/2022 Unknown        Cord compression syndrome ([address] ICD-10-CM: G95.20  ICD-9-CM: 336.9  1/19/2022 Yes                Review of Systems:   Pertinent items are noted in HPI. Vital Signs:    Last 24hrs VS reviewed since prior progress note.  Most recent are:  Visit Vitals  BP (!) 145/97 (BP 1 Location: Left arm, BP Patient Position: At rest)   Pulse 85   Temp 97.4 °F (36.3 °C)   Resp 18   Ht 5' 2\" (1.575 m)   Wt 82.2 kg (181 lb 3.5 oz)   SpO2 100%   BMI 33.15 kg/m²         Intake/Output Summary (Last 24 hours) at 4/4/2022 1233  Last data filed at 4/4/2022 4445  Gross per 24 hour   Intake 708 ml   Output 1425 ml   Net -717 ml        Physical Examination:     I had a face to face encounter with this patient and independently examined them on 4/4/2022 as outlined below:          Constitutional:  No acute distress, cooperative, pleasant    ENT:  Oral mucosa moist, oropharynx benign. Resp:  CTA bilaterally. No wheezing/rhonchi/rales. No accessory muscle use   CV:  Regular rhythm, normal rate, no murmurs    GI:  Soft, distended, non tender. normoactive bowel sounds,     Musculoskeletal:  No edema, warm, 2+ pulses throughout    Neurologic:  Moves all extremities. AAOx3            Data Review:    Review and/or order of clinical lab test  Review and/or order of tests in the radiology section of CPT  Review and/or order of tests in the medicine section of CPT      Labs:     Recent Labs     04/04/22  0042 04/03/22  0603 04/02/22  1415 04/02/22  0253   WBC 7.6  --   --  5.8   HGB 7.8* 7.6*   < > 5.7*   HCT 24.5* 23.7*   < > 19.0*     --   --  200    < > = values in this interval not displayed. Recent Labs     04/04/22  0042 04/03/22  1805 04/03/22  0205    139 140   K 3.3* 3.2* 3.7    109* 111*   CO2 26 28 24   BUN 8 8 7   CREA 0.70 0.77 0.76   GLU 85 105* 104*   CA 11.9* 11.8* 12.9*   MG 1.9 1.9 1.4*   PHOS 3.0 3.1 2.0*     Recent Labs     04/02/22  0046   ALT 26   *   TBILI 0.5   TP 6.0*   ALB 3.3*   GLOB 2.7     No results for input(s): INR, PTP, APTT, INREXT, INREXT in the last 72 hours. No results for input(s): FE, TIBC, PSAT, FERR in the last 72 hours. No results found for: FOL, RBCF   No results for input(s): PH, PCO2, PO2 in the last 72 hours. No results for input(s): CPK, CKNDX, TROIQ in the last 72 hours.     No lab exists for component: CPKMB  No results found for: CHOL, CHOLX, CHLST, CHOLV, HDL, HDLP, LDL, LDLC, DLDLP, TGLX, TRIGL, TRIGP, CHHD, CHHDX  No results found for: [facility]  Lab Results   Component Value Date/Time    Color STRAW 03/31/2022 04:55 PM    Appearance CLEAR 03/31/2022 04:55 PM    Specific gravity 1.015 03/31/2022 04:55 PM    pH (UA) 5.5 03/31/2022 04:55 PM    Protein 30 (A) 03/31/2022 04:55 PM    Glucose Negative 03/31/2022 04:55 PM    Ketone Negative 03/31/2022 04:55 PM    Bilirubin Negative 03/31/2022 04:55 PM    Urobilinogen 0.2 03/31/2022 04:55 PM    Nitrites Negative 03/31/2022 04:55 PM    Leukocyte Esterase LARGE (A) 03/31/2022 04:55 PM    Epithelial cells FEW 03/31/2022 04:55 PM    Bacteria 3+ (A) 03/31/2022 04:55 PM    WBC >100 (H) 03/31/2022 04:55 PM    RBC 5-10 03/31/2022 04:55 PM         Medications Reviewed:     Current Facility-Administered Medications   Medication Dose Route Frequency    0.9% sodium chloride infusion  75 mL/hr IntraVENous CONTINUOUS    [START ON 4/5/2022] polyethylene glycol (MIRALAX) packet 17 g  17 g Oral DAILY    zoledronic acid (ZOMETA) 4 mg/100mL infusion  4 mg IntraVENous ONCE    0.9% sodium chloride infusion 250 mL  250 mL IntraVENous PRN    saline peripheral flush soln 5-40 mL  5-40 mL InterCATHeter PRN    SALINE PERIPHERAL FLUSH Q8H soln 5-40 mL  5-40 mL InterCATHeter Q8H    sucralfate (CARAFATE) tablet 1 g  1 g Oral QID    HYDROmorphone (DILAUDID) injection 0.5 mg  0.5 mg IntraVENous Q6H PRN    magnesium hydroxide (MILK OF MAGNESIA) 400 mg/5 mL oral suspension 30 mL  30 mL Oral DAILY PRN    metoprolol tartrate (LOPRESSOR) tablet 50 mg  50 mg Oral Q12H    baclofen (LIORESAL) tablet 10 mg  10 mg Oral QID PRN    acetaminophen (TYLENOL) tablet 650 mg  650 mg Oral Q6H PRN    DULoxetine (CYMBALTA) capsule 60 mg  60 mg Oral DAILY    fentaNYL (DURAGESIC) 75 mcg/hr patch 1 Patch  1 Patch TransDERmal Q72H    gabapentin (NEURONTIN) capsule 100 mg  100 mg Oral TID    oxyCODONE IR (ROXICODONE) tablet 10 mg  10 mg Oral Q6H PRN    naloxone (NARCAN) injection 0.4 mg  0.4 mg IntraVENous EVERY 2 MINUTES AS NEEDED    balsam peru-castor oiL (VENELEX) ointment   Topical BID    pantoprazole (PROTONIX) injection 40 mg  40 mg IntraVENous Q12H    And    sodium chloride (NS) flush 10 mL  10 mL IntraVENous Q12H    bisacodyL (DULCOLAX) suppository 10 mg  10 mg Rectal DAILY PRN    naloxegoL (MOVANTIK) tablet 25 mg  25 mg Oral DAILY    alteplase (CATHFLO) 1 mg in sterile water (preservative free) 1 mL injection  1 mg InterCATHeter PRN    bacitracin 500 unit/gram packet 1 Packet  1 Packet Topical PRN    heparin (porcine) pf 500 Units  500 Units InterCATHeter PRN     ______________________________________________________________________  EXPECTED LENGTH OF STAY: 3d 2h  ACTUAL LENGTH OF STAY:          [address], [address]

## 2022-04-04 NOTE — PROGRESS NOTES
Problem: Falls - Risk of  Goal: *Absence of Falls  Description: Document Darlene Shields Fall Risk and appropriate interventions in the flowsheet. Outcome: Progressing Towards Goal  Note: Fall Risk Interventions:  Mobility Interventions: Communicate number of staff needed for ambulation/transfer,Patient to call before getting OOB         Medication Interventions: Evaluate medications/consider consulting pharmacy    Elimination Interventions: Call light in reach,Patient to call for help with toileting needs              Problem: Patient Education: Go to Patient Education Activity  Goal: Patient/Family Education  Outcome: Progressing Towards Goal     Problem: Pressure Injury - Risk of  Goal: *Prevention of pressure injury  Description: Document Broderick Scale and appropriate interventions in the flowsheet. Outcome: Progressing Towards Goal  Note: Pressure Injury Interventions:             Activity Interventions: Increase time out of bed,Pressure redistribution bed/mattress(bed type),PT/OT evaluation    Mobility Interventions: Float heels,HOB 30 degrees or less,PT/OT evaluation,Pressure redistribution bed/mattress (bed type)    Nutrition Interventions: Document food/fluid/supplement intake,Offer support with meals,snacks and hydration    Friction and Shear Interventions: Apply protective barrier, creams and emollients,Feet elevated on foot rest,Lift sheet,Minimize layers,Lift team/patient mobility team                Problem: Patient Education: Go to Patient Education Activity  Goal: Patient/Family Education  Outcome: Progressing Towards Goal

## 2022-04-04 NOTE — PROGRESS NOTES
[facility]  [address] [place] [address] E Robson Simms, [address]   [phone]                     GI PROGRESS NOTE    Patient Name: Jany Ye      : 1990      MRN: [id number]  Admit Date: 3/31/2022  Today's Date: 2022    Subjective:     Decreased lower abdominal pain. Had 2 loose non-bloody bowel movements. Objective:     Blood pressure (!) 145/97, pulse 79, temperature 97.4 °F (36.3 °C), resp. rate 18, height 5' 2\" (1.575 m), weight 81.3 kg (179 lb 3.7 oz), SpO2 100 %. Physical Exam:  General appearance:  Cooperative AAF, NAD  Skin: Extremities and face reveal no rashes. HEENT: Sclerae anicteric. Extra-occular muscles are intact. Cardiovascular: Regular rate and rhythm. No murmurs, gallops, or rubs. Respiratory:  Clear breath sounds with no wheezes, rales, or rhonchi. GI: Abdomen nondistended, soft, and mild lower abdominal tenderness. Normal active bowel sounds. Rectal: Deferred   Musculoskeletal: No pitting edema of the lower legs. Neurological:  Patient is alert and oriented. Psychiatric: No anxiety or agitation.  Flat affect     Data Review:    Recent Results (from the past 24 hour(s))   METABOLIC PANEL, BASIC    Collection Time: 22  6:05 PM   Result Value Ref Range    Sodium 139 136 - 145 mmol/L    Potassium 3.2 (L) 3.5 - 5.1 mmol/L    Chloride 109 (H) 97 - 108 mmol/L    CO2 28 21 - 32 mmol/L    Anion gap 2 (L) 5 - 15 mmol/L    Glucose 105 (H) 65 - 100 mg/dL    BUN 8 6 - 20 MG/DL    Creatinine 0.77 0.55 - 1.02 MG/DL    BUN/Creatinine ratio 10 (L) 12 - 20      GFR est AA >60 >60 ml/min/1.73m2    GFR est non-AA >60 >60 ml/min/1.73m2    Calcium 11.8 (H) 8.5 - 10.1 MG/DL   PHOSPHORUS    Collection Time: 22  6:05 PM   Result Value Ref Range    Phosphorus 3.1 2.6 - 4.7 MG/DL   MAGNESIUM    Collection Time: 22  6:05 PM   Result Value Ref Range    Magnesium 1.9 1.6 - 2.4 mg/dL   CBC WITH AUTOMATED DIFF    Collection Time: 22 12:42 AM Result Value Ref Range    WBC 7.6 3.6 - 11.0 K/uL    RBC 3.06 (L) 3.80 - 5.20 M/uL    HGB 7.8 (L) 11.5 - 16.0 g/dL    HCT 24.5 (L) 35.0 - 47.0 %    MCV 80.1 80.0 - 99.0 FL    MCH 25.5 (L) 26.0 - 34.0 PG    MCHC 31.8 30.0 - 36.5 g/dL    RDW 19.3 (H) 11.5 - 14.5 %    PLATELET 166 627 - 987 K/uL    MPV 9.6 8.9 - 12.9 FL    NRBC 2.1 (H) 0  WBC    ABSOLUTE NRBC 0.16 (H) 0.00 - 0.01 K/uL    NEUTROPHILS 53 32 - 75 %    LYMPHOCYTES 27 12 - 49 %    MONOCYTES 7 5 - 13 %    EOSINOPHILS 5 0 - 7 %    BASOPHILS 0 0 - 1 %    METAMYELOCYTES 3 (H) 0 %    MYELOCYTES 5 (H) 0 %    IMMATURE GRANULOCYTES 0 %    ABS. NEUTROPHILS 4.0 1.8 - 8.0 K/UL    ABS. LYMPHOCYTES 2.1 0.8 - 3.5 K/UL    ABS. MONOCYTES 0.5 0.0 - 1.0 K/UL    ABS. EOSINOPHILS 0.4 0.0 - 0.4 K/UL    ABS. BASOPHILS 0.0 0.0 - 0.1 K/UL    ABS. IMM. GRANS. 0.0 K/UL    DF MANUAL      RBC COMMENTS ANISOCYTOSIS  1+        RBC COMMENTS POLYCHROMASIA  PRESENT       METABOLIC PANEL, BASIC    Collection Time: 04/04/22 12:42 AM   Result Value Ref Range    Sodium 139 136 - 145 mmol/L    Potassium 3.3 (L) 3.5 - 5.1 mmol/L    Chloride 108 97 - 108 mmol/L    CO2 26 21 - 32 mmol/L    Anion gap 5 5 - 15 mmol/L    Glucose 85 65 - 100 mg/dL    BUN 8 6 - 20 MG/DL    Creatinine 0.70 0.55 - 1.02 MG/DL    BUN/Creatinine ratio 11 (L) 12 - 20      GFR est AA >60 >60 ml/min/1.73m2    GFR est non-AA >60 >60 ml/min/1.73m2    Calcium 11.9 (H) 8.5 - 10.1 MG/DL   PHOSPHORUS    Collection Time: 04/04/22 12:42 AM   Result Value Ref Range    Phosphorus 3.0 2.6 - 4.7 MG/DL   MAGNESIUM    Collection Time: 04/04/22 12:42 AM   Result Value Ref Range    Magnesium 1.9 1.6 - 2.4 mg/dL         Assessment / Plan :     Abdominal pain   Constipation  Hypokalemia  Hypercalcemia     26 yo female with h/o metastatic breast cancer. Presents with abdominal pain and constipation. Her constipation is multifactorial, with opioids playing a key role as she is on multiple narcotics for pain control.   She also has electrolyte imbalances which can largely contribute altered GI motility; K+ 2.6 an Ca 13. 3.      Labs: Hgb 7.8 today, 7.6 yesterday. No further bleeding. Reports loose stools today. Calcium remains elevated at 11.9 and K+ 3.3, Mag 1.9.      - Continue Movantik 25mg daily   - Decrease Miralax to daily and discontinue with diarrhea  - replete K+ - per hospitalist, maintain K+>4 and mag >2 to maintain adequate GI motility      GI will signoff at this time. Please call with questions prior to discharge. ATTENDING ADDENDUM OF TASHIA NOTE:    I discussed care with the nurse practitioner who has made the above exam and assessment regarding Jocleyn Lopez. I agree with the findings and plan as documented in the note above and have edited it to reflect my findings and plan.          Patient Active Hospital Problem List:   Active Problems:    Cord compression syndrome ([facility] [address] (1/19/2022)      NAHED (acute kidney injury) ([facility] [address] (3/31/2022)      Hypoalbuminemia (4/1/2022)      Hypokalemia (4/1/2022)          Janet Huber NP

## 2022-04-04 NOTE — PROGRESS NOTES
SHAMEKA:  RUR: 15%    Disposition:  Home with [facility] ([facility]). Chart reviewed. Plans are for possible d/c to home tomorrow. Patient will need [facility] orders SN as she had been known to [facility] agency. S transport to be arranged.     Ruy Cisse, [address], [address] [place] [address]

## 2022-04-04 NOTE — PROGRESS NOTES
Physician Progress Note      Erlin Bowling  CSN #:                  [id number]  :                       1990  ADMIT DATE:       3/31/2022 3:06 PM  DISCH DATE:  RESPONDING  PROVIDER #:        JANINA DIMAS          QUERY TEXT:    Pt admitted with abdominal pain, constipation, and urinary retention and has acute on chronic anemia documented. If possible, please document in progress notes and discharge summary further specificity regarding the acuity and type of anemia:    The medical record reflects the following:  Risk Factors: 31yo with breast cancer    Clinical Indicators:  Occult stool positive    4/2 GI  Anemia- acute on chronic. Serial H&H and transfuse Hgb to goal of 7-8. Treatment: H&H following, occult stool test, blood transfusion    Thank you,  Magy Clemente  [phone]  Options provided:  -- Anemia due to acute on chronic blood loss  -- Other - I will add my own diagnosis  -- Disagree - Not applicable / Not valid  -- Disagree - Clinically unable to determine / Unknown  -- Refer to Clinical Documentation Reviewer    PROVIDER RESPONSE TEXT:    Provider is clinically unable to determine a response to this query.     Query created by: Sabra Del Cid on 2022 2:50 PM      Electronically signed by:  Jasson DIMAS 2022 6:25 PM

## 2022-04-04 NOTE — CONSULTS
[address]    Name:  Emily Donnelly  MR#:  [id number]  :  1990  ACCOUNT #:  [de-identified]  DATE OF SERVICE:      IN-HOSPITAL NEPHROLOGY CONSULTATION    REFERRING PHYSICIAN:  Dr. Juanita Lew. REASON FOR CONSULTATION:  Hypercalcemia. HISTORY OF PRESENT ILLNESS:  The patient is a very pleasant 77-year-old black female, who was diagnosed with invasive ductal carcinoma grade III in 2019. The patient underwent mastectomy and chemotherapy. Her stage is stage IV with metastasis in the bones. The patient was on Zometa infusions every 28 days, but she missed two of her doses due to fracture of one of her vertebrae and inability to go for her followup. The patient came to the emergency room with complaints of abdominal pain. She was found to have opioid-induced severe constipation which seems to be improving. The patient still has bone pains at different sites. She denies nausea. Her calcium was initially 14. She was given infusion with saline. PAST MEDICAL HISTORY:  Stage IV breast cancer, laminectomy and fusion of T11-L3, acute kidney injury which had resolved, cord compression syndrome. ALLERGIES:  NO KNOWN MEDICAL ALLERGIES. CURRENT MEDICATIONS:  1. Lupron. 2.  Letrozole. 3.  The patient is also on saline infusion. MEDICATION LIST AT HOME:  Includes also,  1. Gabapentin. 2.  Motrin. 3.  Oxycodone. 4.  Metoprolol. 5.  Senna. FAMILY HISTORY:  Negative for renal disease or cancer. SOCIAL HISTORY:  Denies alcohol, tobacco or illicit drug abuse. REVIEW OF SYSTEMS:  As outlined in history of present illness. The patient denies chills, fever, diarrhea, dizziness, lightheadedness, cough, shortness of breath, dysuric symptoms. PHYSICAL EXAMINATION:  GENERAL:  Gillermina Moder, very pleasant black female, who is awake, alert, oriented. She is having lunch. Mother is at bedside. The patient does not appear to be in any acute distress.   VITAL SIGNS: Blood pressure is 145/97, heart rate is 113, temperature is 97.4. HEENT:  Head is normocephalic. Eyes with anicteric sclerae. Mouth with moist oral mucosa. Ears and nose without abnormal discharge. NECK:  Supple with no increased JVP, carotid bruit or thyromegaly. LUNGS:  Clear to auscultation. CHEST:  Symmetrical.  The patient has left mastectomy. HEART:  With S1 and S2 at regular rhythm and mild tachycardia. ABDOMEN:  Soft with positive bowel sounds. EXTREMITIES:  With no edema, cyanosis or clubbing. LABORATORY DATA:  Sodium is 139, potassium is 3.3, CO2 is 26, BUN is 8, creatinine 0.7. Calcium was initially 14, later 12.9, currently 11.9. White blood count is 7.6 and hemoglobin is 7.8. IMPRESSION:  1. Humoral hypercalcemia related to metastatic breast cancer. The patient is on Zometa, but she missed her last two doses. Calcium is slightly improved with saline but still above normal.  2.  Normal renal function. 3.  Mild hypokalemia. 4.  Anemia of unclear etiology, most probably related to the patient's oncology disease and effect of therapy. Iron deficiency is suspected as well. RECOMMENDATIONS:  1. Resume the Zometa. Given infusion today and continue monitoring calcium. No need of hypercalcemia workup. The patient already has her diagnosis. Her renal function is normal.  2.  Supplement potassium and check magnesium level. 3.  Check iron profile and supplement if necessary. Thank you very much for the opportunity to be part of this patient's care. Our service will follow up with you closely.       MD SABA Infante/[id number]  D:  04/04/2022 12:40  T:  04/04/2022 13:53  JOB #:  [id number]

## 2022-04-05 VITALS
DIASTOLIC BLOOD PRESSURE: 108 MMHG | HEIGHT: 62 IN | BODY MASS INDEX: 30.75 KG/M2 | SYSTOLIC BLOOD PRESSURE: 152 MMHG | OXYGEN SATURATION: 98 % | RESPIRATION RATE: 18 BRPM | WEIGHT: 167.11 LBS | HEART RATE: 83 BPM | TEMPERATURE: 97.8 F

## 2022-04-05 LAB
ALBUMIN SERPL-MCNC: 2.8 G/DL (ref 3.5–5)
ALBUMIN/GLOB SERPL: 0.8 {RATIO} (ref 1.1–2.2)
ALP SERPL-CCNC: 357 U/L (ref 45–117)
ALT SERPL-CCNC: 26 U/L (ref 12–78)
ANION GAP SERPL CALC-SCNC: 5 MMOL/L (ref 5–15)
AST SERPL-CCNC: 107 U/L (ref 15–37)
BILIRUB SERPL-MCNC: 0.6 MG/DL (ref 0.2–1)
BUN SERPL-MCNC: 8 MG/DL (ref 6–20)
BUN/CREAT SERPL: 13 (ref 12–20)
CALCIUM SERPL-MCNC: 12.4 MG/DL (ref 8.5–10.1)
CHLORIDE SERPL-SCNC: 108 MMOL/L (ref 97–108)
CO2 SERPL-SCNC: 26 MMOL/L (ref 21–32)
CREAT SERPL-MCNC: 0.62 MG/DL (ref 0.55–1.02)
GLOBULIN SER CALC-MCNC: 3.5 G/DL (ref 2–4)
GLUCOSE SERPL-MCNC: 90 MG/DL (ref 65–100)
PHOSPHATE SERPL-MCNC: 2.6 MG/DL (ref 2.6–4.7)
POTASSIUM SERPL-SCNC: 3 MMOL/L (ref 3.5–5.1)
PROT SERPL-MCNC: 6.3 G/DL (ref 6.4–8.2)
SODIUM SERPL-SCNC: 139 MMOL/L (ref 136–145)

## 2022-04-05 PROCEDURE — 74011250637 HC RX REV CODE- 250/637: Performed by: INTERNAL MEDICINE

## 2022-04-05 PROCEDURE — 36591 DRAW BLOOD OFF VENOUS DEVICE: CPT

## 2022-04-05 PROCEDURE — 74011250636 HC RX REV CODE- 250/636: Performed by: NURSE PRACTITIONER

## 2022-04-05 PROCEDURE — 74011250637 HC RX REV CODE- 250/637: Performed by: STUDENT IN AN ORGANIZED HEALTH CARE EDUCATION/TRAINING PROGRAM

## 2022-04-05 PROCEDURE — C9113 INJ PANTOPRAZOLE SODIUM, VIA: HCPCS | Performed by: NURSE PRACTITIONER

## 2022-04-05 PROCEDURE — 74011250636 HC RX REV CODE- 250/636: Performed by: STUDENT IN AN ORGANIZED HEALTH CARE EDUCATION/TRAINING PROGRAM

## 2022-04-05 PROCEDURE — 84100 ASSAY OF PHOSPHORUS: CPT

## 2022-04-05 PROCEDURE — 74011000250 HC RX REV CODE- 250: Performed by: NURSE PRACTITIONER

## 2022-04-05 PROCEDURE — 80053 COMPREHEN METABOLIC PANEL: CPT

## 2022-04-05 PROCEDURE — 74011250637 HC RX REV CODE- 250/637: Performed by: NURSE PRACTITIONER

## 2022-04-05 RX ORDER — BALSAM PERU/CASTOR OIL
OINTMENT (GRAM) TOPICAL 2 TIMES DAILY
Qty: 5 G | Refills: 1 | Status: SHIPPED | OUTPATIENT
Start: 2022-04-05

## 2022-04-05 RX ORDER — POTASSIUM CHLORIDE 750 MG/1
40 TABLET, FILM COATED, EXTENDED RELEASE ORAL 2 TIMES DAILY
Status: DISCONTINUED | OUTPATIENT
Start: 2022-04-05 | End: 2022-04-05 | Stop reason: HOSPADM

## 2022-04-05 RX ORDER — OXYCODONE HYDROCHLORIDE 10 MG/1
10 TABLET ORAL
Qty: 15 TABLET | Refills: 0 | Status: SHIPPED | OUTPATIENT
Start: 2022-04-05 | End: 2022-04-10

## 2022-04-05 RX ORDER — HYDROMORPHONE HYDROCHLORIDE 1 MG/ML
0.5 INJECTION, SOLUTION INTRAMUSCULAR; INTRAVENOUS; SUBCUTANEOUS
Status: DISCONTINUED | OUTPATIENT
Start: 2022-04-05 | End: 2022-04-05 | Stop reason: HOSPADM

## 2022-04-05 RX ORDER — HYDROMORPHONE HYDROCHLORIDE 2 MG/1
2 TABLET ORAL
Qty: 15 TABLET | Refills: 0 | Status: SHIPPED | OUTPATIENT
Start: 2022-04-05 | End: 2022-04-10

## 2022-04-05 RX ORDER — SUCRALFATE 1 G/1
1 TABLET ORAL 4 TIMES DAILY
Qty: 56 TABLET | Refills: 0 | Status: SHIPPED | OUTPATIENT
Start: 2022-04-05 | End: 2022-04-19

## 2022-04-05 RX ADMIN — HYDROMORPHONE HYDROCHLORIDE 0.5 MG: 1 INJECTION, SOLUTION INTRAMUSCULAR; INTRAVENOUS; SUBCUTANEOUS at 15:02

## 2022-04-05 RX ADMIN — GABAPENTIN 100 MG: 100 CAPSULE ORAL at 15:02

## 2022-04-05 RX ADMIN — HYDROMORPHONE HYDROCHLORIDE 0.5 MG: 1 INJECTION, SOLUTION INTRAMUSCULAR; INTRAVENOUS; SUBCUTANEOUS at 08:53

## 2022-04-05 RX ADMIN — NALOXEGOL OXALATE 25 MG: 25 TABLET, FILM COATED ORAL at 08:54

## 2022-04-05 RX ADMIN — Medication: at 08:54

## 2022-04-05 RX ADMIN — HYDROMORPHONE HYDROCHLORIDE 0.5 MG: 1 INJECTION, SOLUTION INTRAMUSCULAR; INTRAVENOUS; SUBCUTANEOUS at 12:14

## 2022-04-05 RX ADMIN — POTASSIUM CHLORIDE 40 MEQ: 750 TABLET, EXTENDED RELEASE ORAL at 08:53

## 2022-04-05 RX ADMIN — SODIUM CHLORIDE, PRESERVATIVE FREE 10 ML: 5 INJECTION INTRAVENOUS at 08:53

## 2022-04-05 RX ADMIN — SUCRALFATE 1 G: 1 TABLET ORAL at 08:54

## 2022-04-05 RX ADMIN — Medication 10 ML: at 15:02

## 2022-04-05 RX ADMIN — SUCRALFATE 1 G: 1 TABLET ORAL at 12:14

## 2022-04-05 RX ADMIN — METOPROLOL TARTRATE 50 MG: 50 TABLET, FILM COATED ORAL at 08:54

## 2022-04-05 RX ADMIN — GABAPENTIN 100 MG: 100 CAPSULE ORAL at 08:54

## 2022-04-05 RX ADMIN — DULOXETINE HYDROCHLORIDE 60 MG: 60 CAPSULE, DELAYED RELEASE ORAL at 08:54

## 2022-04-05 RX ADMIN — HYDROMORPHONE HYDROCHLORIDE 0.5 MG: 1 INJECTION, SOLUTION INTRAMUSCULAR; INTRAVENOUS; SUBCUTANEOUS at 02:01

## 2022-04-05 RX ADMIN — PANTOPRAZOLE SODIUM 40 MG: 40 INJECTION, POWDER, FOR SOLUTION INTRAVENOUS at 08:53

## 2022-04-05 RX ADMIN — Medication 10 ML: at 05:50

## 2022-04-05 RX ADMIN — SODIUM CHLORIDE 75 ML/HR: 9 INJECTION, SOLUTION INTRAVENOUS at 02:01

## 2022-04-05 NOTE — DISCHARGE SUMMARY
Discharge Summary       PATIENT ID: Homer Handley  MRN: [id number]   YOB: 1990    DATE OF ADMISSION: 3/31/2022  3:06 PM    DATE OF DISCHARGE: 4/5/2022   PRIMARY CARE PROVIDER: Claudell Mikes, MD     ATTENDING PHYSICIAN: Mik Curiel MD  DISCHARGING PROVIDER: Mateus Burgos NP    To contact this individual call [phone] and ask the  to page. If unavailable ask to be transferred the Adult Hospitalist Department. CONSULTATIONS: IP CONSULT TO HOSPITALIST  IP CONSULT TO GASTROENTEROLOGY  IP CONSULT TO HEMATOLOGY  IP CONSULT TO NEPHROLOGY    PROCEDURES/SURGERIES: * No surgery found *    ADMITTING [address] [place] [address] COURSE:     From ED H&P \"27year-old female with past medical history of metastatic breast cancer with cord compression. , presenting with complaints of abdominal pain, constipation, urinary retention, in the setting of a history of known metastatic breast cancer, with spinal metastasis.  Patient was evaluated here in January for urinary retention and lower extremity weakness, discovered to have cervical, thoracic and lumbar metastatic disease, underwent lumbar laminectomy by neurosurgical service.  Patient with urinary retention thereafter, the did not resolve, requiring the addition of Ochoa catheter.  Patient states approximately 7 to 10 days of constipation, with small bowel movements produced with the use of enemas.  She states urinary retention developed today despite the fact that there is a Ochoa catheter in place.  She endorses generalized abdominal tenderness, mild nausea with one episode of emesis.  She states she has been using MiraLAX and stool softeners, to offset the use of narcotic pain control which she has been taking since she was discharged from rehab.  Patient endorses mildly decreased strength in the bilateral lower extremities.  Physical exam is remarkable for a well-appearing young female, in no acute distress noted to be normotensive, afebrile, mildly tachycardic, satting well on room air.  Bedside bladder scan with greater than 900 cc of urine, likely obstructed Ochoa catheter.  Plan to replace Ochoa catheter, obtain CMP, CBC, UA, lipase, CT scan of the abdomen and pelvis.  We will consider spinal imaging, reassess, and make a disposition. \"      DISCHARGE DIAGNOSES / PLAN:      Abdominal pain  Constipation  -GI consulted,   -Movantik started will hold if patient has additional episodes of melena no melena endorsed overnight.  -increase MiraLAX to twice daily  -Seems to be chronic issue likely related to opioid pain regimen. NAHED  Hypercalcemia  Hypokalemia  - Zometa as outpatient therapy, will defer to oncology  - EKG, NSR, presently asymptomatic  -Patient volume resuscitated on admission   -Potassium was low, repleted  -Calcium was remaining elevated, received Zometa on 4/4.   We will not peak for about another 5 days  To follow-up with primary oncologist    Melena   -Occult stool, positive  -GI has evaluated  -Protonix BID  -No further episodes    History of metastatic breast cancer with cord compression  -Continue current pain regimen,  Follow-up with primary oncologist     Anemia  Iron and folate within normal limits  Likely related to chemotherapy versus anemia of chronic disease  To follow-up with primary oncologist in 3 days       PENDING TEST RESULTS:   At the time of discharge the following test results are still pending:     FOLLOW UP APPOINTMENTS:    Follow-up Information     Follow up With Specialties Details Why Contact Info    Claudell Mikes, [address]   [address]      Almita Sheldon MD Hematology and Oncology In 3 days  [facility] [address] [place] [address]  [phone]             ADDITIONAL CARE RECOMMENDATIONS:     DIET: Regular Diet      ACTIVITY: Activity as tolerated    WOUND CARE: venelex    EQUIPMENT needed: na      DISCHARGE MEDICATIONS:  Current Discharge Medication List      START taking these medications    Details   balsam peru-castor oiL (VENELEX) ointment Apply  to affected area two (2) times a day. Qty: 5 g, Refills: 1  Start date: 4/5/2022      sucralfate (CARAFATE) 1 gram tablet Take 1 Tablet by mouth four (4) times daily for 14 days. Qty: 56 Tablet, Refills: 0  Start date: 4/5/2022, End date: 4/19/2022         CONTINUE these medications which have CHANGED    Details   HYDROmorphone (DILAUDID) 2 mg tablet Take 1 Tablet by mouth every six (6) hours as needed for Pain for up to 5 days. Max Daily Amount: 8 mg. Qty: 15 Tablet, Refills: 0  Start date: 4/5/2022, End date: 4/10/2022    Associated Diagnoses: Cancer ([facility] Cord compression syndrome (HCC)      oxyCODONE IR (ROXICODONE) 10 mg tab immediate release tablet Take 1 Tablet by mouth every six (6) hours as needed for Pain for up to 5 days. Max Daily Amount: 40 mg.  Qty: 15 Tablet, Refills: 0  Start date: 4/5/2022, End date: 4/10/2022    Associated Diagnoses: [facility] Cord compression syndrome (HCC)         CONTINUE these medications which have NOT CHANGED    Details   DULoxetine (CYMBALTA) 60 mg capsule TAKE 1 CAPSULE BY MOUTH EVERY DAY DO NOT CRUSH OR CHEW      fentaNYL (DURAGESIC) 75 mcg/hr APPLY 1 PATCH EVERY 72 HOURS      gabapentin (NEURONTIN) 100 mg capsule       metoprolol tartrate (LOPRESSOR) 50 mg tablet Take 1 Tablet by mouth every twelve (12) hours. Qty: 60 Tablet, Refills: 2      senna-docusate (PERICOLACE) 8.6-50 mg per tablet Take 2 Tablets by mouth two (2) times a day. Qty: 60 Tablet, Refills: 2      baclofen (LIORESAL) 10 mg tablet Take 1 Tablet by mouth four (4) times daily as needed for Muscle Spasm(s) (abd cramps). Qty: 60 Tablet, Refills: 0      acetaminophen (TYLENOL) 325 mg tablet Take 2 Tablets by mouth every six (6) hours as needed for Fever (headache).   Qty: 30 Tablet, Refills: 0         STOP taking these medications       ibuprofen (MOTRIN) 600 mg tablet Comments:   Reason for Stopping:         oxyCODONE-acetaminophen (PERCOCET) 5-325 mg per tablet Comments:   Reason for Stopping:         enoxaparin (LOVENOX) 40 mg/0.4 mL Comments:   Reason for Stopping:                 NOTIFY YOUR PHYSICIAN FOR ANY OF THE FOLLOWING:   Fever over 101 degrees for 24 hours. Chest pain, shortness of breath, fever, chills, nausea, vomiting, diarrhea, change in mentation, falling, weakness, bleeding. Severe pain or pain not relieved by medications. Or, any other signs or symptoms that you may have questions about.     DISPOSITION:    Home With:   OT  PT x HH  RN       Long term SNF/Inpatient Rehab    Independent/assisted living    Hospice    Other:       PATIENT CONDITION AT DISCHARGE:     Functional status    Poor    x Deconditioned     Independent      Cognition    x Lucid     Forgetful     Dementia      Catheters/lines (plus indication)    Ochoa     PICC     PEG    x None      Code status    x Full code     DNR      PHYSICAL EXAMINATION AT DISCHARGE:   Refer to Progress Note      CHRONIC MEDICAL DIAGNOSES:  Problem List as of 4/5/2022 Date Reviewed: 4/1/2022          Codes Class Noted - Resolved    Hypoalbuminemia ICD-10-CM: E88.09  ICD-9-CM: 273.8  4/1/2022 - Present        Hypokalemia ICD-10-CM: E87.6  ICD-9-CM: 276.8  4/1/2022 - Present        NAHED (acute kidney injury) ([facility] ICD-10-CM: N17.9  ICD-9-CM: 584.9  3/31/2022 - Present        Cord compression syndrome ([facility] ICD-10-CM: G95.20  ICD-9-CM: 336.9  1/19/2022 - Present              Greater than 30 minutes were spent with the patient on counseling and coordination of care    Signed:   More Jackson NP  4/5/2022  12:21 PM

## 2022-04-05 NOTE — PROGRESS NOTES
Problem: Falls - Risk of  Goal: *Absence of Falls  Description: Document Priyanka Jasso Fall Risk and appropriate interventions in the flowsheet. Outcome: Progressing Towards Goal  Note: Fall Risk Interventions:  Mobility Interventions: Communicate number of staff needed for ambulation/transfer         Medication Interventions: Evaluate medications/consider consulting pharmacy    Elimination Interventions: Call light in reach              Problem: Patient Education: Go to Patient Education Activity  Goal: Patient/Family Education  Outcome: Progressing Towards Goal     Problem: Pressure Injury - Risk of  Goal: *Prevention of pressure injury  Description: Document Broderick Scale and appropriate interventions in the flowsheet. Outcome: Progressing Towards Goal  Note: Pressure Injury Interventions:             Activity Interventions: Increase time out of bed,Pressure redistribution bed/mattress(bed type)    Mobility Interventions: Float heels,HOB 30 degrees or less,Pressure redistribution bed/mattress (bed type)    Nutrition Interventions: Document food/fluid/supplement intake    Friction and Shear Interventions: Apply protective barrier, creams and emollients,Feet elevated on foot rest,Minimize layers                Problem: Patient Education: Go to Patient Education Activity  Goal: Patient/Family Education  Outcome: Progressing Towards Goal

## 2022-04-05 NOTE — DISCHARGE INSTRUCTIONS
Discharge Instructions       PATIENT ID: Jany Ye  MRN: [id number]   YOB: 1990    DATE OF ADMISSION: 3/31/2022  3:06 PM    DATE OF DISCHARGE: 4/5/2022    PRIMARY CARE PROVIDER: Cornelius Curry MD     ATTENDING PHYSICIAN: Albertina Cobian MD  DISCHARGING PROVIDER: Katherine Waters NP    To contact this individual call [phone] and ask the  to page. If unavailable ask to be transferred the Adult Hospitalist Department. DISCHARGE DIAGNOSES NAHED, pain, hypercalcemia    CONSULTATIONS: IP CONSULT TO HOSPITALIST  IP CONSULT TO GASTROENTEROLOGY  IP CONSULT TO HEMATOLOGY  IP CONSULT TO NEPHROLOGY    PROCEDURES/SURGERIES: * No surgery found *    PENDING TEST RESULTS:   At the time of discharge the following test results are still pending:     FOLLOW UP APPOINTMENTS:   Follow-up Information     Follow up With Specialties Details Why Contact Info    Cornelius Curry, [address] [place] [address]  [phone]             ADDITIONAL CARE RECOMMENDATIONS: Follow-up with primary oncologist    DIET: Regular Diet      ACTIVITY: Activity as tolerated    WOUND CARE: venelex    EQUIPMENT needed: has      DISCHARGE MEDICATIONS:   See Medication Reconciliation Form    · It is important that you take the medication exactly as they are prescribed. · Keep your medication in the bottles provided by the pharmacist and keep a list of the medication names, dosages, and times to be taken in your wallet. · Do not take other medications without consulting your doctor. NOTIFY YOUR PHYSICIAN FOR ANY OF THE FOLLOWING:   Fever over 101 degrees for 24 hours. Chest pain, shortness of breath, fever, chills, nausea, vomiting, diarrhea, change in mentation, falling, weakness, bleeding. Severe pain or pain not relieved by medications. Or, any other signs or symptoms that you may have questions about.       DISPOSITION:    Home With:   OT  PT x [facility]  RN       SNF/Inpatient Rehab/LTAC    Independent/assisted living    Hospice    Other:           Signed:   Janiec Neil NP  4/5/2022  12:17 PM

## 2022-04-05 NOTE — PROGRESS NOTES
Name: Mame Gonzalez MRN: [id number]   : 1990 Hospital[facility]   Date: 2022        IMPRESSION:   · Humoral hypercalcemia, related to metastatic breast cancer. Ca is still high. Patient just received the missed dose of Zometa. · Normal renal function  · Worsening hypokalemia - she was started on KCL  · Anemia after GI bleed       PLAN:   · Continue monitoring the ca  · Replace K, check Mg  · Iron profile     Subjective/Interval History:   I have reviewed the flowsheet and previous days notes. ROS:A comprehensive review of systems was negative. Objective:   Vital Signs:    Visit Vitals  BP (!) 158/113 (BP 1 Location: Left arm, BP Patient Position: At rest)   Pulse 87   Temp 97.7 °F (36.5 °C)   Resp 18   Ht 5' 2\" (1.575 m)   Wt 75.8 kg (167 lb 1.7 oz)   SpO2 97%   BMI 30.56 kg/m²       O2 Device: None (Room air)       Temp (24hrs), Av.8 °F (36.6 °C), Min:97.5 °F (36.4 °C), Max:98.5 °F (36.9 °C)       Intake/Output:   Last shift:      No intake/output data recorded. Last 3 shifts:  1901 -  0700  In: -   Out: 3625 [Urine:3625]    Intake/Output Summary (Last 24 hours) at 2022 1132  Last data filed at 2022 0630  Gross per 24 hour   Intake    Output 2500 ml   Net -2500 ml        Physical Exam:  General:    Alert, cooperative, no distress, appears stated age. Head:   Normocephalic, without obvious abnormality, atraumatic. Eyes:   Conjunctivae/corneas clear. Nose:  Nares normal. No drainage or sinus tenderness. Throat:    Lips, mucosa, and tongue normal.  No Thrush  Neck:  Supple, symmetrical,  no adenopathy, thyroid: non tender    no carotid bruit and no JVD. Lungs:   Clear to auscultation bilaterally. No Wheezing or Rhonchi. No rales. Chest wall:  No tenderness or deformity. No Accessory muscle use. Left mastectomy  Heart:   Regular rate and rhythm,  no murmur, rub or gallop. Abdomen:   Soft, non-tender. Not distended.   Bowel sounds normal. No masses  Extremities: Extremities normal, atraumatic, No cyanosis. No edema. No clubbing  Skin:     Texture, turgor normal. No rashes or lesions. Not Jaundiced  Psych:  Good insight. Not anxious or agitated. Neurologic: Normal strength, Alert and oriented X 3. DATA:  Labs:  Recent Labs     04/05/22  0159 04/04/22  1502 04/04/22  0042 04/03/22  1805     --  139 139   K 3.0*  --  3.3* 3.2*     --  108 109*   CO2 26  --  26 28   BUN 8  --  8 8   CREA 0.62  --  0.70 0.77   CA 12.4*  --  11.9* 11.8*   ALB 2.8*  --   --   --    PHOS 2.6  --  3.0 3.1   MG  --  1.7 1.9 1.9     Recent Labs     04/04/22  0042 04/03/22  0603 04/02/22  2147   WBC 7.6  --   --    HGB 7.8* 7.6* 7.5*   HCT 24.5* 23.7* 23.7*     --   --      No results for input(s): MARYANNE, KU, CLU, CREAU in the last 72 hours.     No lab exists for component: PROU    Total time spent with patient:  35 minutes    [] Critical Care Provided    Care Plan discussed with:   Staff, Medical Team    Madai Valente MD

## 2022-04-05 NOTE — PROGRESS NOTES
Hematology-Oncology Progress Note    Nito Heredia  1990  [id number]  4/5/2022    Follow-up for: metastatic breast cancer     [x]        Chart notes since last visit reviewed   [x]        Medications reviewed for allergies and interactions       Case discussed with the following:         []                            []        Nursing Staff                                                                         []        Pathologist                                                                        []        FAMILY      Subjective:     Spoke with patient who complains of: doing better, pain is controlled, no diarrhea, no bleeding    Objective:     Patient Vitals for the past 24 hrs:   BP Temp Pulse Resp SpO2 Weight   04/05/22 1000   87      04/05/22 0835 (!) 158/113 97.7 °F (36.5 °C) 88 18 97 %    04/05/22 0600   93      04/05/22 0400   80      04/05/22 0256 (!) 152/105 98.5 °F (36.9 °C) 97 18 98 % 75.8 kg (167 lb 1.7 oz)   04/05/22 0200   100      04/04/22 2200   97      04/04/22 2000   91      04/04/22 1909 (!) 146/103 97.5 °F (36.4 °C) 85 18 100 %    04/04/22 1800   86      04/04/22 1514 (!) 140/99 97.6 °F (36.4 °C) 87 18 97 %    04/04/22 1400   87      04/04/22 1233      82.2 kg (181 lb 3.5 oz)   04/04/22 1200   85          REVIEW OF SYSTEMS:    Constitutional: negative fever, negative chills, negative weight loss  Eyes:   negative visual changes  ENT:   negative sore throat, tongue or lip swelling  Respiratory:  negative cough, negative dyspnea  Cards:  negative for chest pain, palpitations, lower extremity edema  GI:   negative for nausea, vomiting, diarrhea, and abdominal pain  Neuro:  negative for headaches, dizziness, vertigo  []                        Full ROS o/w normal/non contributor    Constitutional:  Patient looks  []        Sick  []        Frail  [x]        Better                                                 [] Depressed    HEENT:  [x]   NC                         []   AT               []    ALOPECIA           Eyes: []   Normal               []    Icteric  Oropharynx: [x]    Normal                  []  Thrush               []   Dry  Mucositis: []    None                 Grade: []        I  []        II  []        III  []        IV  Neck:   [x]   Supple                  []  Rigid               JVD:    []   ABSENT       []   PRESENT  Lymphadenopathy:   []   None Noted            []   PRESENT    Chest:  [x]   Clear               []    Rhonchi                      Dec'd @     []  Right Base           []   Left Base    CV:             [x]   Regular              []  Irregular               []   Tachy                []   Murmur  Abdominal:   [x]    Soft              [x]   NON-tender               []   Tender      BS:    []   ABSENT                   []   PRESENT  Liver:     [x]  NON-palp                  []   EDGE- palp  Spleen: [x]   NON-palp                   []  EDGE - palp  Mass:   []   ABSENT                          []  PRESENT  Extr:    []  Lymphedema             []   Cyanosis      []  Clubbing  Edema:     [x]   NONE     Compression stockings in place  Skin:  Intact [x]           Purpura []        Rash: []   ABSENT       []  PRESENT  Neuro:  [x]        Normal  []        Confused      Available labs reviewed:  Labs:    Recent Results (from the past 24 hour(s))   IRON PROFILE    Collection Time: 04/04/22  3:02 PM   Result Value Ref Range    Iron 48 35 - 150 ug/dL    TIBC 140 (L) 250 - 450 ug/dL    Iron % saturation 34 20 - 50 %   MAGNESIUM    Collection Time: 04/04/22  3:02 PM   Result Value Ref Range    Magnesium 1.7 1.6 - 2.4 mg/dL   FOLATE    Collection Time: 04/04/22  3:02 PM   Result Value Ref Range    Folate 7.3 5.0 - 83.7 ng/mL   METABOLIC PANEL, COMPREHENSIVE    Collection Time: 04/05/22  1:59 AM   Result Value Ref Range    Sodium 139 136 - 145 mmol/L    Potassium 3.0 (L) 3.5 - 5.1 mmol/L    Chloride 108 97 - 108 mmol/L    CO2 26 21 - 32 mmol/L    Anion gap 5 5 - 15 mmol/L    Glucose 90 65 - 100 mg/dL    BUN 8 6 - 20 MG/DL    Creatinine 0.62 0.55 - 1.02 MG/DL    BUN/Creatinine ratio 13 12 - 20      GFR est AA >60 >60 ml/min/1.73m2    GFR est non-AA >60 >60 ml/min/1.73m2    Calcium 12.4 (H) 8.5 - 10.1 MG/DL    Bilirubin, total 0.6 0.2 - 1.0 MG/DL    ALT (SGPT) 26 12 - 78 U/L    AST (SGOT) 107 (H) 15 - 37 U/L    Alk. phosphatase 357 (H) 45 - 117 U/L    Protein, total 6.3 (L) 6.4 - 8.2 g/dL    Albumin 2.8 (L) 3.5 - 5.0 g/dL    Globulin 3.5 2.0 - 4.0 g/dL    A-G Ratio 0.8 (L) 1.1 - 2.2     PHOSPHORUS    Collection Time: 04/05/22  1:59 AM   Result Value Ref Range    Phosphorus 2.6 2.6 - 4.7 MG/DL       Available Xrays reviewed:    Chemotherapy monitored and toxicities assessed:    Assessment and Plan   1. Metastatic breast cancer. . pt is on femara/lupron . doing well with it so far, plan is for out-pt xrt to L/S spine when surgical wound heals. zometa as out-pt  2. Opioid induced constipation, improved  3.  Melena/gi bleed,, no further bleeding this am.   hgb 7.8 today , stable, gi following    Pablito Phan MD

## 2022-04-05 NOTE — PROGRESS NOTES
SHAMEKA:  RUR: 15%     Disposition:  Home with MANUELA [facility] ([facility]). CM spoke with Shalom Howard NP and was informed of patient's readiness for d/c today. MANUELA/SN orders sent to previous provider ([facility]). CM arranging BLS transport for later today. DHIRAJ Clarke, [address]    [address] has accepted patient for servicing. Additional orders faxed for wound care along with manuela for aguilera catheter care. [facility] confirms 4pm transport. Discharge PCS placed on hard chart. Assigned RN Karsten Lutz) aware of updates.     Reba Clarke, [address]

## 2022-04-05 NOTE — PROGRESS NOTES
Spiritual Care Partner Volunteer visited patient in [address] on 4.5.22    Documented by Cande Alicea, Staff , on 4.5.22  Please call Jacque Fletcher ([phone]) to page  if needed

## 2022-04-05 NOTE — PROGRESS NOTES
Attempted to schedule hospital follow up PCP appointment. Office nurse will contact the patient with appointment information.   Bryant Perales, Care Management Specialist.

## 2022-04-09 LAB — PTH RELATED PROT SERPL-SCNC: <2 PMOL/L

## 2022-05-09 ENCOUNTER — HOSPITAL ENCOUNTER (EMERGENCY)
Age: 32
Discharge: HOME | End: 2022-05-09
Attending: EMERGENCY MEDICINE
Payer: COMMERCIAL

## 2022-05-09 VITALS
RESPIRATION RATE: 16 BRPM | SYSTOLIC BLOOD PRESSURE: 120 MMHG | OXYGEN SATURATION: 98 % | HEART RATE: 102 BPM | TEMPERATURE: 98.2 F | DIASTOLIC BLOOD PRESSURE: 83 MMHG

## 2022-05-09 DIAGNOSIS — N30.00 ACUTE CYSTITIS WITHOUT HEMATURIA: Primary | ICD-10-CM

## 2022-05-09 DIAGNOSIS — C50.919 METASTATIC BREAST CANCER (HCC): ICD-10-CM

## 2022-05-09 LAB
ALBUMIN SERPL-MCNC: 2.1 G/DL (ref 3.5–5)
ALBUMIN/GLOB SERPL: 0.5 {RATIO} (ref 1.1–2.2)
ALP SERPL-CCNC: 570 U/L (ref 45–117)
ALT SERPL-CCNC: 68 U/L (ref 12–78)
ANION GAP SERPL CALC-SCNC: 5 MMOL/L (ref 5–15)
APPEARANCE UR: ABNORMAL
AST SERPL-CCNC: 244 U/L (ref 15–37)
BACTERIA URNS QL MICRO: NEGATIVE /HPF
BASOPHILS # BLD: 0 K/UL (ref 0–0.1)
BASOPHILS NFR BLD: 0 % (ref 0–1)
BILIRUB SERPL-MCNC: 0.9 MG/DL (ref 0.2–1)
BILIRUB UR QL CFM: NEGATIVE
BUN SERPL-MCNC: 5 MG/DL (ref 6–20)
BUN/CREAT SERPL: 20 (ref 12–20)
CALCIUM SERPL-MCNC: 8.6 MG/DL (ref 8.5–10.1)
CHLORIDE SERPL-SCNC: 97 MMOL/L (ref 97–108)
CO2 SERPL-SCNC: 28 MMOL/L (ref 21–32)
COLOR UR: ABNORMAL
CREAT SERPL-MCNC: 0.25 MG/DL (ref 0.55–1.02)
DIFFERENTIAL METHOD BLD: ABNORMAL
EOSINOPHIL # BLD: 0.1 K/UL (ref 0–0.4)
EOSINOPHIL NFR BLD: 1 % (ref 0–7)
EPITH CASTS URNS QL MICRO: ABNORMAL /LPF
ERYTHROCYTE [DISTWIDTH] IN BLOOD BY AUTOMATED COUNT: 19.5 % (ref 11.5–14.5)
GLOBULIN SER CALC-MCNC: 4.5 G/DL (ref 2–4)
GLUCOSE SERPL-MCNC: 104 MG/DL (ref 65–100)
GLUCOSE UR STRIP.AUTO-MCNC: NEGATIVE MG/DL
HCT VFR BLD AUTO: 25.8 % (ref 35–47)
HGB BLD-MCNC: 8.1 G/DL (ref 11.5–16)
HGB UR QL STRIP: ABNORMAL
IMM GRANULOCYTES # BLD AUTO: 0 K/UL
IMM GRANULOCYTES NFR BLD AUTO: 0 %
KETONES UR QL STRIP.AUTO: NEGATIVE MG/DL
LEUKOCYTE ESTERASE UR QL STRIP.AUTO: ABNORMAL
LYMPHOCYTES # BLD: 1.7 K/UL (ref 0.8–3.5)
LYMPHOCYTES NFR BLD: 20 % (ref 12–49)
MCH RBC QN AUTO: 26.8 PG (ref 26–34)
MCHC RBC AUTO-ENTMCNC: 31.4 G/DL (ref 30–36.5)
MCV RBC AUTO: 85.4 FL (ref 80–99)
METAMYELOCYTES NFR BLD MANUAL: 3 %
MONOCYTES # BLD: 0.2 K/UL (ref 0–1)
MONOCYTES NFR BLD: 2 % (ref 5–13)
MYELOCYTES NFR BLD MANUAL: 3 %
NEUTS BAND NFR BLD MANUAL: 1 % (ref 0–6)
NEUTS SEG # BLD: 5.9 K/UL (ref 1.8–8)
NEUTS SEG NFR BLD: 70 % (ref 32–75)
NITRITE UR QL STRIP.AUTO: POSITIVE
NRBC # BLD: 1.15 K/UL (ref 0–0.01)
NRBC BLD-RTO: 13.8 PER 100 WBC
PH UR STRIP: 7.5 [PH] (ref 5–8)
PLATELET # BLD AUTO: 97 K/UL (ref 150–400)
PMV BLD AUTO: 9.5 FL (ref 8.9–12.9)
POTASSIUM SERPL-SCNC: 3.9 MMOL/L (ref 3.5–5.1)
PROT SERPL-MCNC: 6.6 G/DL (ref 6.4–8.2)
PROT UR STRIP-MCNC: 30 MG/DL
RBC # BLD AUTO: 3.02 M/UL (ref 3.8–5.2)
RBC #/AREA URNS HPF: ABNORMAL /HPF (ref 0–5)
RBC MORPH BLD: ABNORMAL
RBC MORPH BLD: ABNORMAL
SODIUM SERPL-SCNC: 130 MMOL/L (ref 136–145)
SP GR UR REFRACTOMETRY: 1.02 (ref 1–1.03)
UR CULT HOLD, URHOLD: NORMAL
UROBILINOGEN UR QL STRIP.AUTO: 4 EU/DL (ref 0.2–1)
WBC # BLD AUTO: 8.3 K/UL (ref 3.6–11)
WBC URNS QL MICRO: ABNORMAL /HPF (ref 0–4)

## 2022-05-09 PROCEDURE — 87077 CULTURE AEROBIC IDENTIFY: CPT

## 2022-05-09 PROCEDURE — 36415 COLL VENOUS BLD VENIPUNCTURE: CPT

## 2022-05-09 PROCEDURE — 74011250637 HC RX REV CODE- 250/637: Performed by: EMERGENCY MEDICINE

## 2022-05-09 PROCEDURE — 87086 URINE CULTURE/COLONY COUNT: CPT

## 2022-05-09 PROCEDURE — 80053 COMPREHEN METABOLIC PANEL: CPT

## 2022-05-09 PROCEDURE — 85025 COMPLETE CBC W/AUTO DIFF WBC: CPT

## 2022-05-09 PROCEDURE — 96375 TX/PRO/DX INJ NEW DRUG ADDON: CPT

## 2022-05-09 PROCEDURE — 96374 THER/PROPH/DIAG INJ IV PUSH: CPT

## 2022-05-09 PROCEDURE — 87186 SC STD MICRODIL/AGAR DIL: CPT

## 2022-05-09 PROCEDURE — 74011000250 HC RX REV CODE- 250: Performed by: EMERGENCY MEDICINE

## 2022-05-09 PROCEDURE — 81001 URINALYSIS AUTO W/SCOPE: CPT

## 2022-05-09 PROCEDURE — 74011250636 HC RX REV CODE- 250/636: Performed by: EMERGENCY MEDICINE

## 2022-05-09 PROCEDURE — 99284 EMERGENCY DEPT VISIT MOD MDM: CPT

## 2022-05-09 RX ORDER — CEPHALEXIN 500 MG/1
500 CAPSULE ORAL 4 TIMES DAILY
Qty: 28 CAPSULE | Refills: 0 | Status: SHIPPED | OUTPATIENT
Start: 2022-05-09 | End: 2022-05-16

## 2022-05-09 RX ORDER — HYDROMORPHONE HYDROCHLORIDE 2 MG/1
4 TABLET ORAL
Status: COMPLETED | OUTPATIENT
Start: 2022-05-09 | End: 2022-05-09

## 2022-05-09 RX ORDER — HYDROMORPHONE HYDROCHLORIDE 2 MG/ML
2 INJECTION, SOLUTION INTRAMUSCULAR; INTRAVENOUS; SUBCUTANEOUS
Status: COMPLETED | OUTPATIENT
Start: 2022-05-09 | End: 2022-05-09

## 2022-05-09 RX ADMIN — HYDROMORPHONE HYDROCHLORIDE 2 MG: 2 INJECTION INTRAMUSCULAR; INTRAVENOUS; SUBCUTANEOUS at 15:16

## 2022-05-09 RX ADMIN — SODIUM CHLORIDE 1000 ML: 9 INJECTION, SOLUTION INTRAVENOUS at 15:20

## 2022-05-09 RX ADMIN — HYDROMORPHONE HYDROCHLORIDE 4 MG: 2 TABLET ORAL at 18:53

## 2022-05-09 RX ADMIN — CEFTRIAXONE SODIUM 1 G: 1 INJECTION, POWDER, FOR SOLUTION INTRAMUSCULAR; INTRAVENOUS at 16:32

## 2022-05-09 NOTE — ED TRIAGE NOTES
Patient arrives to ED via EMS from home for possible UTI, patient with chronic aguilera, reports dark urine and back pain.

## 2022-05-09 NOTE — ED PROVIDER NOTES
Ms. Shira Villanueva is a 30yo female who presents to the ER with complaints of possible urinary tract infection. She was discharged 2 days ago from an outside hospital.  She had been admitted with constipation at that point. She had began to feel febrile with chills at home. She noticed that her urine became cloudy. Therefore, she came to the ER. She has a history of metastatic breast cancer. She takes 4 mg of Dilaudid every 3-4 hours. She denies runny nose, sore throat, cough. No changes with her bowel movements. She has some mild discomfort in her lower abdomen. However, she said that this feels like when he has had urinary tract infections in the past.  She said that her pain is severe now she had been moved around in the hospital bed and her pain is now severe. Past Medical History:   Diagnosis Date    Breast cancer ([facility] [address]     Metastatic breast cancer ([facility] [place] [facility]        No past surgical history on file. No family history on file. Social History     Socioeconomic History    Marital status: SINGLE     Spouse name: Not on file    Number of children: Not on file    Years of education: Not on file    Highest education level: Not on file   Occupational History    Not on file   Tobacco Use    Smoking status: Not on file    Smokeless tobacco: Not on file   Substance and Sexual Activity    Alcohol use: Not on file    Drug use: Not on file    Sexual activity: Not on file   Other Topics Concern    Not on file   Social History Narrative    Not on file     Social Determinants of Health     Financial Resource Strain:     Difficulty of Paying Living Expenses: Not on file   Food Insecurity:     Worried About Running Out of Food in the Last Year: Not on file    Adriana of Food in the Last Year: Not on file   Transportation Needs:     Lack of Transportation (Medical): Not on file    Lack of Transportation (Non-Medical):  Not on file   Physical Activity:     Days of Exercise per Week: Not on file  Minutes of Exercise per Session: Not on file   Stress:     Feeling of Stress : Not on file   Social Connections:     Frequency of Communication with Friends and Family: Not on file    Frequency of Social Gatherings with Friends and Family: Not on file    Attends Sikhism Services: Not on file    Active Member of Clubs or Organizations: Not on file    Attends Club or Organization Meetings: Not on file    Marital Status: Not on file   Intimate Partner Violence:     Fear of Current or Ex-Partner: Not on file    Emotionally Abused: Not on file    Physically Abused: Not on file    Sexually Abused: Not on file   Housing Stability:     Unable to Pay for Housing in the Last Year: Not on file    Number of [place] in the Last Year: Not on file    Unstable Housing in the Last Year: Not on file         ALLERGIES: Patient has no known allergies. Review of Systems   Constitutional: Positive for chills. HENT: Negative for rhinorrhea and sore throat. Respiratory: Negative for cough and shortness of breath. Cardiovascular: Negative for chest pain. Gastrointestinal: Positive for abdominal pain. Negative for diarrhea, nausea and vomiting. Genitourinary: Negative for dysuria and urgency. Musculoskeletal: Negative for arthralgias and back pain. Skin: Negative for rash. Neurological: Negative for dizziness, weakness and light-headedness. Vitals:    05/09/22 1226 05/09/22 1330   BP: 110/75 (!) 113/90   Pulse: (!) 106 (!) 107   Resp: 18 18   Temp: 98 °F (36.7 °C) 98 °F (36.7 °C)   SpO2: 99% 99%            Physical Exam     Vital signs reviewed. Nursing notes reviewed.     Const:   Appears uncomfortable  Head:  Atraumatic, normocephalic  Eyes:  PERRL, conjunctiva normal, no scleral icterus  Neck:  Supple, trachea midline  Cardiovascular: Tachycardic  Resp:  No resp distress, no increased work of breathing  Abd:  Soft, non-tender, non-distended  MSK:  No pedal edema, normal ROM  Neuro: Alert and oriented x3, no cranial nerve defect  Skin:  Warm, dry, intact  Psych: normal mood and affect, behavior is normal, judgement and thought content is normal          MDM  Number of Diagnoses or Management Options     Amount and/or Complexity of Data Reviewed  Clinical lab tests: ordered and reviewed  Review and summarize past medical records: yes    Patient Progress  Patient progress: stable            Ms. Lottie Ibanez is a 30yo female who presents to the ER with complaints of fatigue and cloudy urine. She has a history of recurrent UTIs. Pt. Has continued pain, which is chronic and unchanged. Pt. Is mildly tachycardic, but this seems to be in part due to her pain. Her HR was 100 when I went to re-evaluate her. Normal WBC. I will start her on abx. Pt. To f/u with her PCP. She was given strict instructions to return to the ER with new or worsening sx.     Procedures

## 2022-05-11 LAB
BACTERIA SPEC CULT: ABNORMAL
CC UR VC: ABNORMAL
SERVICE CMNT-IMP: ABNORMAL

## 2022-05-11 RX ORDER — DOXYCYCLINE 100 MG/1
100 CAPSULE ORAL 2 TIMES DAILY
Qty: 20 CAPSULE | Refills: 0 | Status: SHIPPED | OUTPATIENT
Start: 2022-05-11 | End: 2022-05-21

## 2022-05-11 NOTE — PROGRESS NOTES
Pt returned call.  Informed need to stop keflex, ERX for doxycycline 100 mg bid x 10 d to [facility]

## 2022-05-11 NOTE — PROGRESS NOTES
Attempted to reach patient.  Message left for call back concerning culture result and need for treatment change

## 2022-05-19 ENCOUNTER — HOSPITAL ENCOUNTER (OUTPATIENT)
Dept: RADIATION THERAPY | Age: 32
Discharge: HOME | End: 2022-05-19
Payer: COMMERCIAL

## 2022-05-19 PROCEDURE — 77290 THER RAD SIMULAJ FIELD CPLX: CPT

## 2022-05-23 ENCOUNTER — HOSPITAL ENCOUNTER (OUTPATIENT)
Dept: RADIATION THERAPY | Age: 32
Discharge: HOME | End: 2022-05-23
Payer: COMMERCIAL

## 2022-05-23 PROCEDURE — 77295 3-D RADIOTHERAPY PLAN: CPT

## 2022-05-23 PROCEDURE — 77300 RADIATION THERAPY DOSE PLAN: CPT

## 2022-05-23 PROCEDURE — 77334 RADIATION TREATMENT AID(S): CPT

## 2022-05-24 ENCOUNTER — HOSPITAL ENCOUNTER (OUTPATIENT)
Dept: RADIATION THERAPY | Age: 32
Discharge: HOME | End: 2022-05-24
Payer: COMMERCIAL

## 2022-05-24 PROCEDURE — 77412 RADIATION TX DELIVERY LVL 3: CPT

## 2022-05-24 PROCEDURE — 77417 THER RADIOLOGY PORT IMAGE(S): CPT

## 2022-05-25 ENCOUNTER — HOSPITAL ENCOUNTER (OUTPATIENT)
Dept: RADIATION THERAPY | Age: 32
Discharge: HOME | End: 2022-05-25
Payer: COMMERCIAL

## 2022-05-25 PROCEDURE — 77412 RADIATION TX DELIVERY LVL 3: CPT

## 2022-05-26 ENCOUNTER — HOSPITAL ENCOUNTER (OUTPATIENT)
Dept: RADIATION THERAPY | Age: 32
Discharge: HOME | End: 2022-05-26
Payer: COMMERCIAL

## 2022-05-26 PROCEDURE — 77412 RADIATION TX DELIVERY LVL 3: CPT

## 2022-05-27 ENCOUNTER — HOSPITAL ENCOUNTER (OUTPATIENT)
Dept: RADIATION THERAPY | Age: 32
Discharge: HOME | End: 2022-05-27
Payer: COMMERCIAL

## 2022-05-27 PROCEDURE — 77412 RADIATION TX DELIVERY LVL 3: CPT

## 2022-05-31 ENCOUNTER — APPOINTMENT (OUTPATIENT)
Dept: RADIATION THERAPY | Age: 32
End: 2022-05-31
Payer: COMMERCIAL

## 2022-06-06 PROCEDURE — 77336 RADIATION PHYSICS CONSULT: CPT

## 2022-06-10 ENCOUNTER — APPOINTMENT (OUTPATIENT)
Dept: RADIATION THERAPY | Age: 32
End: 2022-06-10
Payer: COMMERCIAL

## 2022-06-13 ENCOUNTER — APPOINTMENT (OUTPATIENT)
Dept: RADIATION THERAPY | Age: 32
End: 2022-06-13
Payer: COMMERCIAL

## 2024-05-08 NOTE — PROGRESS NOTES
Palliative Medicine  [place]: [phone]  [facility]: [phone] ([phone]      The SW met with the patient at bedside, however, she is sleeping soundly and did not awake- her mother is visiting at bedside. The patient's mother shares that the had a difficult evening- did not get much rest, was having pain down her leg. The patent's mother shares that the patient's medications on-board do currently help once received, but reports some breakthrough pain- patient sleeping at this time, will mention to our Palliative MD to see if any adjustments need to be made and try to touch-base with the patient later today. The patient's mother also reports that she has not had a bowl movement within the last few days. Will discuss with team.     14:43 p.m. - The Palliative Medicine SW met with the patient at bedside to provide psychosocial support, she describes her difficult night, but overall shares that she is feeling well. Discussed patient's pain, she does not think she needs adjustments currently, attending MD has been assisting/managing, and she knows to notify MD if she feels like she needs adjustments. SW inquired about the patient's spirits, she shares she is doing okay- it is difficult being in the hospital, however, she reports she has had a long stent where she was not requiring hospitalization. She shares that she is taking things one step at a time, and is well supported by friends and family. The patient is not , she has a 9year-old son Clayton Crain- SW inquired about Clayton Crain, she shares she does Facetime him, her parents assist in caring for him. She shares that when she leaves [facility], she plans to also go to her parents' home. The patient shares about her journey in her illness, SW inquired about where she finds her strength- she shares that she greatly looks to her eloy. SW inquired about any specific worries or fears, she denies currently.  She shares that she appreciates that she has a good Patient understanding of next-steps. SW inquired about next steps- the patient shared that after rehab, she will be exploring chemotherapy and/or radiation options. Discussed the difficulty of the unknowns, but she does like to have an idea of what may be next and appreciates the updates she has been given from teams. The patient has contact information for Palliative Medicine, please call our team or re-consult if we can be of further support or assistance.            Thank you for including Palliative Medicine in the care of Andreia Roa [address]  [phone] Chart(s)/Patient

## (undated) DEVICE — HYPODERMIC SAFETY NEEDLE: Brand: MAGELLAN

## (undated) DEVICE — SOLUTION IRRIG 1000ML 0.9% SOD CHL USP POUR PLAS BTL

## (undated) DEVICE — LAMINECTOMY-SMH: Brand: MEDLINE INDUSTRIES, INC.

## (undated) DEVICE — Device

## (undated) DEVICE — GLOVE ORANGE PI 7 1/2   MSG9075

## (undated) DEVICE — HANDLE LT SNAP ON ULT DURABLE LENS FOR TRUMPF ALC DISPOSABLE

## (undated) DEVICE — TELFA ADHESIVE ISLAND DRESSING: Brand: TELFA

## (undated) DEVICE — SOLUTION IV 250ML 0.9% SOD CHL CLR INJ FLX BG CONT PRT CLSR

## (undated) DEVICE — TUBING, SUCTION, 1/4" X 10', STRAIGHT: Brand: MEDLINE

## (undated) DEVICE — SUTURE MCRYL SZ 4 0 L18IN ABSRB VLT PS 1 L24MM 3 8 CIR REV Y682H

## (undated) DEVICE — POSITIONER HD REST FOAM CMFRT TCH

## (undated) DEVICE — SUTURE VCRL SZ 2-0 L18IN ABSRB UD L26MM CP-2 1/2 CIR REV J762D

## (undated) DEVICE — BONE WAX WHITE: Brand: BONE WAX WHITE

## (undated) DEVICE — FLOSEAL HEMOSTATIC MATRIX, 10ML: Brand: FLOSEAL HEMOSTATIC MATRIX

## (undated) DEVICE — GLOVE ORANGE PI 8   MSG9080

## (undated) DEVICE — BIPOLAR FORCEPS CORD: Brand: VALLEYLAB

## (undated) DEVICE — TOOL 14MH30 LEGEND 14CM 3MM: Brand: MIDAS REX ™

## (undated) DEVICE — MASTISOL ADHESIVE LIQ 2/3ML

## (undated) DEVICE — NEEDLE SPNL 20GA L3.5IN YEL HUB S STL REG WALL FIT STYL W/